# Patient Record
Sex: MALE | Race: WHITE | NOT HISPANIC OR LATINO | Employment: OTHER | URBAN - METROPOLITAN AREA
[De-identification: names, ages, dates, MRNs, and addresses within clinical notes are randomized per-mention and may not be internally consistent; named-entity substitution may affect disease eponyms.]

---

## 2017-04-28 ENCOUNTER — OFFICE VISIT (OUTPATIENT)
Dept: CARDIOLOGY | Facility: CLINIC | Age: 72
End: 2017-04-28

## 2017-04-28 VITALS — SYSTOLIC BLOOD PRESSURE: 124 MMHG | HEART RATE: 78 BPM | DIASTOLIC BLOOD PRESSURE: 78 MMHG

## 2017-04-28 DIAGNOSIS — E78.2 MIXED HYPERLIPIDEMIA: ICD-10-CM

## 2017-04-28 DIAGNOSIS — R00.2 PALPITATIONS: Primary | ICD-10-CM

## 2017-04-28 DIAGNOSIS — I10 ESSENTIAL HYPERTENSION: ICD-10-CM

## 2017-04-28 PROCEDURE — 99213 OFFICE O/P EST LOW 20 MIN: CPT | Performed by: INTERNAL MEDICINE

## 2017-04-28 RX ORDER — NEBIVOLOL 5 MG/1
5 TABLET ORAL DAILY
Qty: 90 TABLET | Refills: 3 | Status: SHIPPED | OUTPATIENT
Start: 2017-04-28 | End: 2018-05-04 | Stop reason: SDUPTHER

## 2017-04-28 RX ORDER — ASPIRIN 81 MG/1
81 TABLET ORAL DAILY
COMMUNITY

## 2017-04-28 RX ORDER — ATORVASTATIN CALCIUM 40 MG/1
40 TABLET, FILM COATED ORAL DAILY
COMMUNITY
End: 2017-04-28 | Stop reason: SDUPTHER

## 2017-04-28 RX ORDER — ATORVASTATIN CALCIUM 40 MG/1
40 TABLET, FILM COATED ORAL DAILY
Qty: 90 TABLET | Refills: 3 | Status: SHIPPED | OUTPATIENT
Start: 2017-04-28 | End: 2018-07-02 | Stop reason: SDUPTHER

## 2017-04-28 NOTE — PROGRESS NOTES
Andrea Rosales   1945  [phone]          PCP: Alberto Denis MD  [address] [place] [id number]    IDENTIFICATION: A 71 y.o. male orig from [place] retired  from [employer], 1928–1983.    Chief Complaint   Patient presents with   • Follow-up   • Palpitations     hx   • Hypertension       PROBLEM LIST: PROBLEM LIST:  1. Hypertension.    6/16 SE 10 min wnl   2. Hyperlipidemia.    3. Osteoarthritis.    4. Prostate cancer status post prostatectomy.      Palpitations - 2016 holter 1.7% pac      Allergies  No Known Allergies    Current Medications    Current Outpatient Prescriptions:   •  aspirin 81 MG EC tablet, Take 81 mg by mouth Daily., Disp: , Rfl:   •  atorvastatin (LIPITOR) 40 MG tablet, Take 1 tablet by mouth Daily., Disp: 90 tablet, Rfl: 3  •  Multiple Vitamin (MULTI-VITAMIN DAILY PO), Take  by mouth., Disp: , Rfl:   •  nebivolol (BYSTOLIC) 5 MG tablet, Take 1 tablet by mouth Daily., Disp: 90 tablet, Rfl: 3  •  valsartan-hydrochlorothiazide (DIOVAN-HCT) 80-12.5 MG per tablet, 1 tablet daily., Disp: , Rfl: 0    History of Present Illness     Pt denies any chest pain, dyspnea, dyspnea on exertion, orthopnea, PND, palpitations, lower extremity edema.  Plays tennis 2X week    ROS:  All systems have been reviewed and are negative with the exception of those mentioned in the HPI.    OBJECTIVE:  Vitals:    04/28/17 1438   BP: 124/78   BP Location: Right arm   Patient Position: Sitting   Pulse: 78     Physical Exam   Constitutional: He appears well-developed and well-nourished.   Neck: Normal range of motion. Neck supple. No hepatojugular reflux and no JVD present. Carotid bruit is not present. No tracheal deviation present. No thyromegaly present.   Cardiovascular: Normal rate, regular rhythm, S1 normal, S2 normal, intact distal pulses and normal pulses.  PMI is not displaced.  Exam reveals no gallop, no distant heart sounds, no friction rub, no midsystolic click and no opening  snap.    No murmur heard.  Pulses:       Radial pulses are 2+ on the right side, and 2+ on the left side.        Dorsalis pedis pulses are 2+ on the right side, and 2+ on the left side.        Posterior tibial pulses are 2+ on the right side, and 2+ on the left side.   Pulmonary/Chest: Effort normal and breath sounds normal. He has no wheezes. He has no rales.   Abdominal: Soft. Bowel sounds are normal. He exhibits no mass. There is no tenderness. There is no guarding.       Diagnostic Data:  Procedures      ASSESSMENT:   Diagnosis Plan   1. Palpitations     2. Mixed hyperlipidemia     3. Essential hypertension         PLAN:  Palpitations- stable on RX, nl lvef.    HL on statin    htn controlled , low today pt will follow as outpt     Fu 9 months    Alberto Denis MD, thank you for referring Mr. Rosales for evaluation.  I have forwarded my electronically generated recommendations to you for review.  Please do not hesitate to call with any questions.      Dann Max MD, FACC

## 2018-05-02 NOTE — PROGRESS NOTES
"Andrea Rosales Jr.  1945  [phone]    PCP: Alberto Denis MD  [address] [facility] [address]  [place] [id number]    IDENTIFICATION: A 72 y.o. male orig from [place] retired  from [employer]. Does financial consulting part time now.     Chief Complaint   Patient presents with   • Palpitations   • Hyperlipidemia       PROBLEM LIST: PROBLEM LIST:  1. Hypertension.   1. 6/16 SE 10 min wnl   2. Hyperlipidemia.    3. Palpitations   1. 2016 holter 1.7% pac  4. Osteoarthritis.    5. Prostate cancer status post prostatectomy.      Allergies  No Known Allergies    Current Medications    Current Outpatient Prescriptions:   •  aspirin 81 MG EC tablet, Take 81 mg by mouth Daily., Disp: , Rfl:   •  atorvastatin (LIPITOR) 40 MG tablet, Take 1 tablet by mouth Daily., Disp: 90 tablet, Rfl: 3  •  Multiple Vitamin (MULTI-VITAMIN DAILY PO), Take 1 tablet by mouth Daily., Disp: , Rfl:   •  valsartan-hydrochlorothiazide (DIOVAN-HCT) 80-12.5 MG per tablet, Take 1 tablet by mouth Daily., Disp: , Rfl: 0  •  nebivolol (BYSTOLIC) 5 MG tablet, Take 1 tablet by mouth Daily., Disp: 90 tablet, Rfl: 3    History of Present Illness     Pt denies any chest pain, dyspnea, dyspnea on exertion, orthopnea, PND, palpitations, lower extremity edema. Plays tennis 2X weekly.    ROS:  All systems have been reviewed and are negative with the exception of those mentioned in the HPI.    OBJECTIVE:  Vitals:    05/04/18 1155   BP: 116/62   BP Location: Right arm   Patient Position: Sitting   Pulse: 70   Weight: 85.4 kg (188 lb 3.2 oz)   Height: 182.9 cm (72\")     Physical Exam   Constitutional: He appears well-developed and well-nourished.   Neck: Normal range of motion. Neck supple. No hepatojugular reflux and no JVD present. Carotid bruit is not present. No tracheal deviation present. No thyromegaly present.   R carotid bruit   Cardiovascular: Normal rate, regular rhythm, S1 normal, S2 normal, intact distal pulses and normal pulses.  " PMI is not displaced.  Exam reveals no gallop, no distant heart sounds, no friction rub, no midsystolic click and no opening snap.    No murmur heard.  Pulses:       Radial pulses are 2+ on the right side, and 2+ on the left side.        Dorsalis pedis pulses are 2+ on the right side, and 2+ on the left side.        Posterior tibial pulses are 2+ on the right side, and 2+ on the left side.   Pulmonary/Chest: Effort normal and breath sounds normal. He has no wheezes. He has no rales.   Abdominal: Soft. Bowel sounds are normal. He exhibits no mass. There is no tenderness. There is no guarding.       Diagnostic Data:  Procedures      ASSESSMENT:   Diagnosis Plan   1. Palpitations  nebivolol (BYSTOLIC) 5 MG tablet   2. Essential hypertension     3. Mixed hyperlipidemia     4. Bruit     5. Carotid bruit, unspecified laterality  Duplex Carotid Ultrasound CAR     PLAN:  Palpitations- stable on RX, nl lvef.    HL on statin    htn controlled , low today pt will follow as outpt     Will screen carotids with cus     Fu 12 months    Alberto Denis MD, thank you for referring Mr. Rosales for evaluation.  I have forwarded my electronically generated recommendations to you for review.  Please do not hesitate to call with any questions.    Scribed for Dann Max MD by Elsa Rooney PA-C. 5/4/2018  12:00 PM  I, Dann Max MD, personally performed the services described in this documentation as scribed by the above named individual in my presence, and it is both accurate and complete.  5/4/2018  12:09 PM    Dann Max MD, [facility]

## 2018-05-04 ENCOUNTER — OFFICE VISIT (OUTPATIENT)
Dept: CARDIOLOGY | Facility: CLINIC | Age: 73
End: 2018-05-04

## 2018-05-04 VITALS
DIASTOLIC BLOOD PRESSURE: 62 MMHG | HEIGHT: 72 IN | WEIGHT: 188.2 LBS | SYSTOLIC BLOOD PRESSURE: 116 MMHG | HEART RATE: 70 BPM | BODY MASS INDEX: 25.49 KG/M2

## 2018-05-04 DIAGNOSIS — E78.2 MIXED HYPERLIPIDEMIA: ICD-10-CM

## 2018-05-04 DIAGNOSIS — R09.89 BRUIT: ICD-10-CM

## 2018-05-04 DIAGNOSIS — R09.89 CAROTID BRUIT, UNSPECIFIED LATERALITY: ICD-10-CM

## 2018-05-04 DIAGNOSIS — R00.2 PALPITATIONS: Primary | ICD-10-CM

## 2018-05-04 DIAGNOSIS — I10 ESSENTIAL HYPERTENSION: ICD-10-CM

## 2018-05-04 PROCEDURE — 99214 OFFICE O/P EST MOD 30 MIN: CPT | Performed by: INTERNAL MEDICINE

## 2018-05-04 RX ORDER — NEBIVOLOL 5 MG/1
5 TABLET ORAL DAILY
Qty: 90 TABLET | Refills: 3 | Status: SHIPPED | OUTPATIENT
Start: 2018-05-04 | End: 2019-05-09 | Stop reason: ALTCHOICE

## 2018-05-07 RX ORDER — NEBIVOLOL HYDROCHLORIDE 5 MG/1
TABLET ORAL
Qty: 90 TABLET | Refills: 3 | Status: SHIPPED | OUTPATIENT
Start: 2018-05-07 | End: 2019-07-20 | Stop reason: SDUPTHER

## 2018-05-30 ENCOUNTER — HOSPITAL ENCOUNTER (OUTPATIENT)
Dept: CARDIOLOGY | Facility: HOSPITAL | Age: 73
Discharge: HOME | End: 2018-05-30
Admitting: PHYSICIAN ASSISTANT

## 2018-05-30 DIAGNOSIS — R09.89 CAROTID BRUIT, UNSPECIFIED LATERALITY: ICD-10-CM

## 2018-05-30 LAB
BH CV ECHO MEAS - BSA(HAYCOCK): 2.1 M^2
BH CV ECHO MEAS - BSA: 2.1 M^2
BH CV ECHO MEAS - BZI_BMI: 25.5 KILOGRAMS/M^2
BH CV ECHO MEAS - BZI_METRIC_HEIGHT: 182.9 CM
BH CV ECHO MEAS - BZI_METRIC_WEIGHT: 85.3 KG
BH CV XLRA MEAS LEFT CCA RATIO VEL: 96.2 CM/SEC
BH CV XLRA MEAS LEFT DIST CCA EDV: 25.5 CM/SEC
BH CV XLRA MEAS LEFT DIST CCA PSV: 97.2 CM/SEC
BH CV XLRA MEAS LEFT DIST ICA EDV: 31.4 CM/SEC
BH CV XLRA MEAS LEFT DIST ICA PSV: 83.3 CM/SEC
BH CV XLRA MEAS LEFT ICA RATIO VEL: 81.6 CM/SEC
BH CV XLRA MEAS LEFT ICA/CCA RATIO: 0.85
BH CV XLRA MEAS LEFT MID CCA EDV: 31.4 CM/SEC
BH CV XLRA MEAS LEFT MID CCA PSV: 113.9 CM/SEC
BH CV XLRA MEAS LEFT MID ICA EDV: 27 CM/SEC
BH CV XLRA MEAS LEFT MID ICA PSV: 67.3 CM/SEC
BH CV XLRA MEAS LEFT PROX CCA EDV: 29.5 CM/SEC
BH CV XLRA MEAS LEFT PROX CCA PSV: 120.8 CM/SEC
BH CV XLRA MEAS LEFT PROX ECA PSV: 133.6 CM/SEC
BH CV XLRA MEAS LEFT PROX ICA EDV: 26.5 CM/SEC
BH CV XLRA MEAS LEFT PROX ICA PSV: 82.2 CM/SEC
BH CV XLRA MEAS LEFT PROX SCLA PSV: 162.1 CM/SEC
BH CV XLRA MEAS LEFT VERTEBRAL A EDV: 11.6 CM/SEC
BH CV XLRA MEAS LEFT VERTEBRAL A PSV: 36.9 CM/SEC
BH CV XLRA MEAS RIGHT CCA RATIO VEL: 78.6 CM/SEC
BH CV XLRA MEAS RIGHT DIST CCA EDV: 27.5 CM/SEC
BH CV XLRA MEAS RIGHT DIST CCA PSV: 79.6 CM/SEC
BH CV XLRA MEAS RIGHT DIST ICA EDV: 25.9 CM/SEC
BH CV XLRA MEAS RIGHT DIST ICA PSV: 70.6 CM/SEC
BH CV XLRA MEAS RIGHT ICA RATIO VEL: 87.4 CM/SEC
BH CV XLRA MEAS RIGHT ICA/CCA RATIO: 1.1
BH CV XLRA MEAS RIGHT MID CCA EDV: 22.6 CM/SEC
BH CV XLRA MEAS RIGHT MID CCA PSV: 89.4 CM/SEC
BH CV XLRA MEAS RIGHT MID ICA EDV: 30.3 CM/SEC
BH CV XLRA MEAS RIGHT MID ICA PSV: 72.8 CM/SEC
BH CV XLRA MEAS RIGHT PROX CCA EDV: 25.5 CM/SEC
BH CV XLRA MEAS RIGHT PROX CCA PSV: 105.1 CM/SEC
BH CV XLRA MEAS RIGHT PROX ECA PSV: 98.4 CM/SEC
BH CV XLRA MEAS RIGHT PROX ICA EDV: 30.4 CM/SEC
BH CV XLRA MEAS RIGHT PROX ICA PSV: 88.4 CM/SEC
BH CV XLRA MEAS RIGHT PROX SCLA PSV: 138.5 CM/SEC
BH CV XLRA MEAS RIGHT VERTEBRAL A EDV: 9 CM/SEC
BH CV XLRA MEAS RIGHT VERTEBRAL A PSV: 37.9 CM/SEC
LEFT ARM BP: NORMAL MMHG

## 2018-05-30 PROCEDURE — 93880 EXTRACRANIAL BILAT STUDY: CPT | Performed by: INTERNAL MEDICINE

## 2018-05-30 PROCEDURE — 93880 EXTRACRANIAL BILAT STUDY: CPT

## 2018-07-02 RX ORDER — ATORVASTATIN CALCIUM 40 MG/1
TABLET, FILM COATED ORAL
Qty: 90 TABLET | Refills: 3 | Status: SHIPPED | OUTPATIENT
Start: 2018-07-02 | End: 2019-06-27 | Stop reason: SDUPTHER

## 2019-01-10 ENCOUNTER — LAB REQUISITION (OUTPATIENT)
Dept: LAB | Facility: HOSPITAL | Age: 74
End: 2019-01-10

## 2019-01-10 ENCOUNTER — OFFICE VISIT (OUTPATIENT)
Dept: INTERNAL MEDICINE | Facility: CLINIC | Age: 74
End: 2019-01-10

## 2019-01-10 VITALS
SYSTOLIC BLOOD PRESSURE: 110 MMHG | HEART RATE: 68 BPM | BODY MASS INDEX: 25.94 KG/M2 | HEIGHT: 72 IN | DIASTOLIC BLOOD PRESSURE: 70 MMHG | WEIGHT: 191.5 LBS | TEMPERATURE: 98.2 F

## 2019-01-10 DIAGNOSIS — I10 ESSENTIAL HYPERTENSION, BENIGN: ICD-10-CM

## 2019-01-10 DIAGNOSIS — E78.2 MIXED HYPERLIPIDEMIA: Primary | ICD-10-CM

## 2019-01-10 DIAGNOSIS — R73.03 PREDIABETES: ICD-10-CM

## 2019-01-10 DIAGNOSIS — Z00.00 ROUTINE GENERAL MEDICAL EXAMINATION AT A HEALTH CARE FACILITY: ICD-10-CM

## 2019-01-10 LAB
ALBUMIN SERPL-MCNC: 4.73 G/DL (ref 3.2–4.8)
ALBUMIN/GLOB SERPL: 1.8 G/DL (ref 1.5–2.5)
ALP SERPL-CCNC: 83 U/L (ref 25–100)
ALT SERPL W P-5'-P-CCNC: 52 U/L (ref 7–40)
ANION GAP SERPL CALCULATED.3IONS-SCNC: 8 MMOL/L (ref 3–11)
ARTICHOKE IGE QN: 75 MG/DL (ref 0–130)
AST SERPL-CCNC: 45 U/L (ref 0–33)
BILIRUB SERPL-MCNC: 0.9 MG/DL (ref 0.3–1.2)
BUN BLD-MCNC: 27 MG/DL (ref 9–23)
BUN/CREAT SERPL: 27 (ref 7–25)
CALCIUM SPEC-SCNC: 9.9 MG/DL (ref 8.7–10.4)
CHLORIDE SERPL-SCNC: 104 MMOL/L (ref 99–109)
CHOLEST SERPL-MCNC: 120 MG/DL (ref 0–200)
CO2 SERPL-SCNC: 28 MMOL/L (ref 20–31)
CREAT BLD-MCNC: 1 MG/DL (ref 0.6–1.3)
GFR SERPL CREATININE-BSD FRML MDRD: 73 ML/MIN/1.73
GLOBULIN UR ELPH-MCNC: 2.6 GM/DL
GLUCOSE BLD-MCNC: 90 MG/DL (ref 70–100)
HBA1C MFR BLD: 5.3 % (ref 4.8–5.6)
HDLC SERPL-MCNC: 38 MG/DL (ref 40–60)
POTASSIUM BLD-SCNC: 4.5 MMOL/L (ref 3.5–5.5)
PROT SERPL-MCNC: 7.3 G/DL (ref 5.7–8.2)
SODIUM BLD-SCNC: 140 MMOL/L (ref 132–146)
TRIGL SERPL-MCNC: 90 MG/DL (ref 0–150)

## 2019-01-10 PROCEDURE — 80053 COMPREHEN METABOLIC PANEL: CPT | Performed by: INTERNAL MEDICINE

## 2019-01-10 PROCEDURE — 36415 COLL VENOUS BLD VENIPUNCTURE: CPT | Performed by: INTERNAL MEDICINE

## 2019-01-10 PROCEDURE — 83036 HEMOGLOBIN GLYCOSYLATED A1C: CPT | Performed by: INTERNAL MEDICINE

## 2019-01-10 PROCEDURE — 99213 OFFICE O/P EST LOW 20 MIN: CPT | Performed by: INTERNAL MEDICINE

## 2019-01-10 PROCEDURE — 80061 LIPID PANEL: CPT | Performed by: INTERNAL MEDICINE

## 2019-01-10 NOTE — PROGRESS NOTES
[facility]     Andrea Rosales Jr.  1945   [id number]      Patient Care Team:  Alberto Denis MD as PCP - General  Alberto Denis MD as PCP - Family Medicine    Chief Complaint::   Chief Complaint   Patient presents with   • Follow-up     6 mos.        HPI  MR Rosales comes in for follow up for his hypertension, hyperlipidemia and prediabetes.  He feels well and has no complaints.     Chronic Conditions:      Patient Active Problem List   Diagnosis   • Hypertension   • Hyperlipidemia   • Osteoarthritis   • Palpitations   • Prediabetes   • Hypercholesterolemia   • Benign essential hypertension        Past Medical History:   Diagnosis Date   • Benign essential hypertension    • History of prostate cancer    • Hypercholesterolemia    • Prediabetes    • Prostate cancer (CMS/[facility])     S/P PROSTATECTOMY   • Right-sided back pain        Past Surgical History:   Procedure Laterality Date   • PROSTATECTOMY  1998    Prostate cancer   • TONSILLECTOMY         Family History   Problem Relation Age of Onset   • Breast cancer Mother    • Stroke Father    • Stomach cancer Father    • Breast cancer Sister        Social History     Socioeconomic History   • Marital status:      Spouse name: Not on file   • Number of children: Not on file   • Years of education: Not on file   • Highest education level: Not on file   Social Needs   • Financial resource strain: Not on file   • Food insecurity - worry: Not on file   • Food insecurity - inability: Not on file   • Transportation needs - medical: Not on file   • Transportation needs - non-medical: Not on file   Occupational History   • Not on file   Tobacco Use   • Smoking status: Never Smoker   • Smokeless tobacco: Never Used   Substance and Sexual Activity   • Alcohol use: Yes     Alcohol/week: 1.2 - 2.4 oz     Types: 1 - 2 Glasses of wine, 1 - 2 Cans of beer per week     Comment: occasionally   • Drug use: No   • Sexual activity: Defer   Other  "Topics Concern   • Not on file   Social History Narrative   • Not on file       No Known Allergies      Current Outpatient Medications:   •  aspirin 81 MG EC tablet, Take 81 mg by mouth Daily., Disp: , Rfl:   •  atorvastatin (LIPITOR) 40 MG tablet, take 1 tablet by mouth once daily, Disp: 90 tablet, Rfl: 3  •  BYSTOLIC 5 MG tablet, take 1 tablet by mouth once daily, Disp: 90 tablet, Rfl: 3  •  Multiple Vitamin (MULTI-VITAMIN DAILY PO), Take 1 tablet by mouth Daily., Disp: , Rfl:   •  valsartan-hydrochlorothiazide (DIOVAN-HCT) 80-12.5 MG per tablet, Take 1 tablet by mouth Daily. Taking 1/2 tablet, Disp: , Rfl: 0  •  nebivolol (BYSTOLIC) 5 MG tablet, Take 1 tablet by mouth Daily., Disp: 90 tablet, Rfl: 3    Review of Systems   Constitutional: Negative.  Negative for chills, fatigue and fever.   HENT: Negative for congestion, ear pain and sinus pressure.    Respiratory: Negative.  Negative for cough, chest tightness, shortness of breath and wheezing.    Cardiovascular: Negative.  Negative for chest pain and palpitations.   Gastrointestinal: Negative for abdominal pain, blood in stool, constipation and diarrhea.   Skin: Negative for color change.   Allergic/Immunologic: Negative for environmental allergies.   Neurological: Negative for dizziness, speech difficulty and headache.   Psychiatric/Behavioral: Negative for decreased concentration. The patient is not nervous/anxious.         Vital Signs  Vitals:    01/10/19 1110   BP: 110/70   BP Location: Left arm   Patient Position: Sitting   Cuff Size: Adult   Pulse: 68   Temp: 98.2 °F (36.8 °C)   TempSrc: Temporal   Weight: 86.9 kg (191 lb 8 oz)   Height: 182.9 cm (72.01\")   PainSc: 0-No pain       Physical Exam   Constitutional: He is oriented to person, place, and time. He appears well-developed and well-nourished.   HENT:   Head: Normocephalic and atraumatic.   Cardiovascular: Normal rate, regular rhythm and normal heart sounds.   No murmur heard.  Pulmonary/Chest: " Effort normal and breath sounds normal.   Neurological: He is alert and oriented to person, place, and time.   Psychiatric: He has a normal mood and affect.   Vitals reviewed.           Assessment/Plan:    1)  HTN  Well controlled  2)  Hyperlipidemia  3)  Prediabetes, pending.     Plan of care reviewed with patient at the conclusion of today's visit. Education was provided regarding diagnosis, management, and any prescribed or recommended OTC medications.Patient verbalizes understanding of and agreement with management plan.         Alberto Denis MD

## 2019-02-07 NOTE — PROGRESS NOTES
"Andrea Rosales Jr.  1945  [phone]    PCP: Alberto Denis MD  [address] [place] [id number]    IDENTIFICATION: A 73 y.o. male orig from [place] retired  from [employer] [facility] [employer] [facility] [employer]. Does financial consulting part time now.     Chief Complaint   Patient presents with   • Hypertension       PROBLEM LIST:    1. Hypertension.    1. 6/16 SE 10 min wnl   2. Hyperlipidemia.    1. 1/10/19  TG 90 HDL 38 LDL 75  3. Palpitations   1. 2016 holter 1.7% pac  4. Carotid disease  1. 5/30/18 C US 0-49% stenosis bilaterally  5. Osteoarthritis.    6. Prostate cancer status post prostatectomy.      Allergies  No Known Allergies    Current Medications    Current Outpatient Medications:   •  aspirin 81 MG EC tablet, Take 81 mg by mouth Daily., Disp: , Rfl:   •  atorvastatin (LIPITOR) 40 MG tablet, take 1 tablet by mouth once daily, Disp: 90 tablet, Rfl: 3  •  BYSTOLIC 5 MG tablet, take 1 tablet by mouth once daily, Disp: 90 tablet, Rfl: 3  •  Multiple Vitamin (MULTI-VITAMIN DAILY PO), Take 1 tablet by mouth Daily., Disp: , Rfl:   •  nebivolol (BYSTOLIC) 5 MG tablet, Take 1 tablet by mouth Daily., Disp: 90 tablet, Rfl: 3  •  valsartan-hydrochlorothiazide (DIOVAN-HCT) 80-12.5 MG per tablet, Take 1 tablet by mouth Daily. Taking 1/2 tablet, Disp: , Rfl: 0    History of Present Illness   Pt doing well. Is planning on traveling internationally this year.  Pt denies any chest pain, dyspnea, dyspnea on exertion, orthopnea, PND, palpitations, lower extremity edema. Plays tennis 2X weekly.    ROS:  All systems have been reviewed and are negative with the exception of those mentioned in the HPI.    OBJECTIVE:  Vitals:    02/08/19 1150   BP: 112/68   BP Location: Left arm   Patient Position: Sitting   Pulse: 76   SpO2: 98%   Weight: 88.3 kg (194 lb 9.6 oz)   Height: 182.9 cm (72\")     Physical Exam   Constitutional: He appears well-developed and well-nourished.   Neck: Normal range of motion. Neck " supple. No hepatojugular reflux and no JVD present. Carotid bruit is not present. No tracheal deviation present. No thyromegaly present.   R carotid bruit   Cardiovascular: Normal rate, regular rhythm, S1 normal, S2 normal, intact distal pulses and normal pulses. PMI is not displaced. Exam reveals no gallop, no distant heart sounds, no friction rub, no midsystolic click and no opening snap.   No murmur heard.  Pulses:       Radial pulses are 2+ on the right side, and 2+ on the left side.        Dorsalis pedis pulses are 2+ on the right side, and 2+ on the left side.        Posterior tibial pulses are 2+ on the right side, and 2+ on the left side.   Pulmonary/Chest: Effort normal and breath sounds normal. He has no wheezes. He has no rales.   Abdominal: Soft. Bowel sounds are normal. He exhibits no mass. There is no tenderness. There is no guarding.       Diagnostic Data:  Procedures      ASSESSMENT:   Diagnosis Plan   1. Essential hypertension     2. Mixed hyperlipidemia     3. Prediabetes       PLAN:  Palpitations- stable on RX, nl lvef.    HL on statin    htn controlled , low today pt will follow as outpt     a1c acceptable    Counseled to ambulate/exercise legs when on long flights.    Fu 12 months    Alberto Denis MD, thank you for referring Mr. Rosales for evaluation.  I have forwarded my electronically generated recommendations to you for review.  Please do not hesitate to call with any questions.    Scribed for Dann Max MD by Elsa Rooney PA-C. 2/8/2019  1:20 PM   I, Dann Max MD, personally performed the services described in this documentation as scribed by the above named individual in my presence, and it is both accurate and complete.  2/8/2019  1:21 PM       Dann Max MD, [facility]

## 2019-02-08 ENCOUNTER — OFFICE VISIT (OUTPATIENT)
Dept: CARDIOLOGY | Facility: CLINIC | Age: 74
End: 2019-02-08

## 2019-02-08 VITALS
OXYGEN SATURATION: 98 % | SYSTOLIC BLOOD PRESSURE: 112 MMHG | HEART RATE: 76 BPM | HEIGHT: 72 IN | WEIGHT: 194.6 LBS | DIASTOLIC BLOOD PRESSURE: 68 MMHG | BODY MASS INDEX: 26.36 KG/M2

## 2019-02-08 DIAGNOSIS — I10 ESSENTIAL HYPERTENSION: Primary | ICD-10-CM

## 2019-02-08 DIAGNOSIS — R73.03 PREDIABETES: ICD-10-CM

## 2019-02-08 DIAGNOSIS — E78.2 MIXED HYPERLIPIDEMIA: ICD-10-CM

## 2019-02-08 PROCEDURE — 99214 OFFICE O/P EST MOD 30 MIN: CPT | Performed by: INTERNAL MEDICINE

## 2019-05-09 ENCOUNTER — OFFICE VISIT (OUTPATIENT)
Dept: INTERNAL MEDICINE | Facility: CLINIC | Age: 74
End: 2019-05-09

## 2019-05-09 VITALS
HEIGHT: 72 IN | DIASTOLIC BLOOD PRESSURE: 70 MMHG | HEART RATE: 64 BPM | SYSTOLIC BLOOD PRESSURE: 124 MMHG | WEIGHT: 194 LBS | BODY MASS INDEX: 26.28 KG/M2

## 2019-05-09 DIAGNOSIS — M25.511 ACUTE PAIN OF RIGHT SHOULDER: Primary | ICD-10-CM

## 2019-05-09 DIAGNOSIS — J30.1 SEASONAL ALLERGIC RHINITIS DUE TO POLLEN: ICD-10-CM

## 2019-05-09 PROBLEM — Z00.00 ANNUAL PHYSICAL EXAM: Status: ACTIVE | Noted: 2019-05-09

## 2019-05-09 PROBLEM — I49.1 PREMATURE ATRIAL CONTRACTION: Status: ACTIVE | Noted: 2019-05-09

## 2019-05-09 PROBLEM — Z85.46 HISTORY OF PROSTATE CANCER: Status: ACTIVE | Noted: 2019-05-09

## 2019-05-09 PROCEDURE — 99213 OFFICE O/P EST LOW 20 MIN: CPT | Performed by: INTERNAL MEDICINE

## 2019-05-09 NOTE — PROGRESS NOTES
[facility]     Andrea Rosales Jr.  1945   [id number]      Patient Care Team:  Alberto Denis MD as PCP - General  Alberto Denis MD as PCP - Family Medicine    Chief Complaint::   Chief Complaint   Patient presents with   • Arm Pain     x4 day After exercise   • Allergies     cough,congestion and nose bleeds        HPI  Mr Rosales comes in complaining of a 5-day history of right shoulder pain.  On Sundays before he plays tennis he lifts weights.  He does bench press and some overhead press and a few other things.  Last Sunday he noticed a little soreness as he was lifting, but later when he went out to play tennis he had more shoulder pain and had to stop after 5 minutes because it kept getting worse.  In the days following he could barely lift his upper extremity to the level of the shoulder without pain.  This has improved minimally.  He also complains of some allergy symptoms with nasal congestion and a few days ago some nosebleeding.  Now he basically has postnasal drainage.    Chronic Conditions:      Patient Active Problem List   Diagnosis   • Hypertension   • Hyperlipidemia   • Osteoarthritis   • Palpitations   • Prediabetes   • Hypercholesterolemia   • Benign essential hypertension   • History of prostate cancer   • Annual physical exam   • Premature atrial contraction        Past Medical History:   Diagnosis Date   • Benign essential hypertension    • History of colonic polyps    • History of prostate cancer    • Hypercholesterolemia    • Prediabetes    • Prostate cancer (CMS/[facility])     S/P PROSTATECTOMY   • Right-sided back pain        Past Surgical History:   Procedure Laterality Date   • PROSTATECTOMY  1998    Prostate cancer-Radical    • TONSILLECTOMY         Family History   Problem Relation Age of Onset   • Breast cancer Mother    • Stroke Father    • Stomach cancer Father         Gastric   • Breast cancer Sister        Social History     Socioeconomic History   •  "Marital status:      Spouse name: Not on file   • Number of children: Not on file   • Years of education: Not on file   • Highest education level: Not on file   Tobacco Use   • Smoking status: Never Smoker   • Smokeless tobacco: Never Used   Substance and Sexual Activity   • Alcohol use: Yes     Alcohol/week: 1.2 - 2.4 oz     Types: 1 - 2 Glasses of wine, 1 - 2 Cans of beer per week     Comment: occasionally   • Drug use: No   • Sexual activity: Defer       No Known Allergies      Current Outpatient Medications:   •  aspirin 81 MG EC tablet, Take 81 mg by mouth Daily., Disp: , Rfl:   •  atorvastatin (LIPITOR) 40 MG tablet, take 1 tablet by mouth once daily, Disp: 90 tablet, Rfl: 3  •  BYSTOLIC 5 MG tablet, take 1 tablet by mouth once daily, Disp: 90 tablet, Rfl: 3  •  Multiple Vitamin (MULTI-VITAMIN DAILY PO), Take 1 tablet by mouth Daily., Disp: , Rfl:   •  valsartan-hydrochlorothiazide (DIOVAN-HCT) 80-12.5 MG per tablet, Take 5 tablets by mouth Daily. Taking 1/2 tablet, Disp: , Rfl: 0    Review of Systems   Constitutional: Negative for chills, fatigue and fever.   HENT: Positive for congestion and postnasal drip. Negative for ear pain and sinus pressure.    Respiratory: Positive for cough. Negative for chest tightness, shortness of breath and wheezing.    Cardiovascular: Negative for chest pain and palpitations.   Gastrointestinal: Negative for abdominal pain, blood in stool and constipation.   Skin: Negative for color change.   Allergic/Immunologic: Negative for environmental allergies.   Neurological: Negative for dizziness, speech difficulty and headache.   Psychiatric/Behavioral: Negative for decreased concentration. The patient is not nervous/anxious.         Vital Signs  Vitals:    05/09/19 1508   BP: 124/70   BP Location: Left arm   Patient Position: Sitting   Cuff Size: Adult   Pulse: 64   Weight: 88 kg (194 lb)   Height: 182.9 cm (72\")   PainSc: 0-No pain       Physical Exam   Constitutional: He is " oriented to person, place, and time. He appears well-developed and well-nourished.   HENT:   Head: Normocephalic and atraumatic.   Right Ear: External ear normal.   Left Ear: External ear normal.   Nose: Nose normal.   Mouth/Throat: Oropharynx is clear and moist.   Cardiovascular: Normal rate, regular rhythm and normal heart sounds.   No murmur heard.  Pulmonary/Chest: Effort normal and breath sounds normal.   Musculoskeletal:   Reduced range of motion of the right shoulder.   Neurological: He is alert and oriented to person, place, and time.   Psychiatric: He has a normal mood and affect.   Vitals reviewed.     Procedures    ACE III MINI             Assessment/Plan:    Andrea was seen today for arm pain and allergies.    Diagnoses and all orders for this visit:    Acute pain of right shoulder  -     Ambulatory Referral to Physical Therapy Evaluate and treat    Seasonal allergic rhinitis due to pollen    Plan    He is referred to physical therapy for his shoulder.  This is most likely rotator cuff strain.    Epistaxis is almost certainly due to seasonal allergies.  Discussed possible treatment such as antihistamines or nasal steroids.  Also discussed the fact that he probably does not need a daily aspirin based on recent recommendations for primary prevention.  If he is reluctant to stop he may take it once or twice a week, but this would reduce his bleeding risk.      Plan of care reviewed with patient at the conclusion of today's visit. Education was provided regarding diagnosis, management, and any prescribed or recommended OTC medications.Patient verbalizes understanding of and agreement with management plan.         Alberto Denis MD

## 2019-05-16 ENCOUNTER — HOSPITAL ENCOUNTER (OUTPATIENT)
Dept: PHYSICAL THERAPY | Facility: HOSPITAL | Age: 74
Setting detail: THERAPIES SERIES
Discharge: HOME | End: 2019-05-16

## 2019-05-16 DIAGNOSIS — M25.511 ACUTE PAIN OF RIGHT SHOULDER: Primary | ICD-10-CM

## 2019-05-16 PROCEDURE — 97161 PT EVAL LOW COMPLEX 20 MIN: CPT | Performed by: PHYSICAL THERAPIST

## 2019-05-16 NOTE — THERAPY EVALUATION
Outpatient Physical Therapy Ortho Initial Evaluation   [facility]     Patient Name: Andrea Rosales Jr.  : 1945  MRN: [id number]  Today's Date: 2019      Visit Date: 2019    Patient Active Problem List   Diagnosis   • Hypertension   • Hyperlipidemia   • Osteoarthritis   • Palpitations   • Prediabetes   • Hypercholesterolemia   • Benign essential hypertension   • History of prostate cancer   • Annual physical exam   • Premature atrial contraction        Past Medical History:   Diagnosis Date   • Benign essential hypertension    • History of colonic polyps    • History of prostate cancer    • Hypercholesterolemia    • Prediabetes    • Prostate cancer (CMS/HCC)     S/P PROSTATECTOMY   • Right-sided back pain         Past Surgical History:   Procedure Laterality Date   • PROSTATECTOMY      Prostate cancer-Radical    • TONSILLECTOMY         Visit Dx:     ICD-10-CM ICD-9-CM   1. Acute pain of right shoulder M25.511 719.41         Patient History     Row Name 19 1400             History    Chief Complaint  Pain  -CR      Type of Pain  Shoulder pain  -CR      Date Current Problem(s) Began  19  -CR      Brief Description of Current Complaint  74 yo male reports onset of right shoulder pain following weight lifting and tennis play about 2 weeks ago. He reports pain with elevatoin of arm.  His main goal is to return to tennis play.  Dr. Denis referred client over.    -CR      Previous treatment for THIS PROBLEM  Other (comment) Ibuprofen  -CR      Patient/Caregiver Goals  Return to prior level of function  -CR      Current Tobacco Use  no  -CR      Smoking Status  no  -CR      Patient's Rating of General Health  Good  -CR      Hand Dominance  right-handed  -CR      Occupation/sports/leisure activities  retired  -CR      How has patient tried to help current problem?  active rest  -CR         Pain     Pain Location  Shoulder  -CR      Pain at Present  6  -CR      Pain at Best  0   -CR      Pain Frequency  Several days a week;Intermittent  -CR      Pain Description  Sharp;Shooting  -CR      What Performance Factors Make the Current Problem(s) WORSE?  raising the right arm  -CR      What Performance Factors Make the Current Problem(s) BETTER?  keeping the arm at the side, not laying on right arm  -CR      Is your sleep disturbed?  No  -CR      Is medication used to assist with sleep?  No  -CR      What position do you sleep in?  Left sidelying  -CR      Difficulties at work?  na  -CR      Difficulties with ADL's?  inability to play tennis  -CR      Difficulties with recreational activities?  yes, inability to play tennis  -CR         Fall Risk Assessment    Any falls in the past year:  No  -CR      Does patient have a fear of falling  No  -CR         Daily Activities    Primary Language  English  -CR      How does patient learn best?  Other (comment)  -CR      Teaching needs identified  Home Exercise Program;Management of Condition  -CR      Patient is concerned about/has problems with  Performing sports, recreation, and play activities  -CR      Does patient have problems with the following?  None  -CR      Barriers to learning  None  -CR      Functional Status  mobility issues preventing performance of daily activities  -CR      Pt Participated in POC and Goals  Yes  -CR         Safety    Are you being hurt, hit, or frightened by anyone at home or in your life?  No  -CR      Are you being neglected by a caregiver  No  -CR        User Key  (r) = Recorded By, (t) = Taken By, (c) = Cosigned By    Initials Name Provider Type    Sang Wynne PT Physical Therapist          PT Ortho     Row Name 05/16/19 1400       Posture/Observations    Alignment Options  Forward head;Rounded shoulders  -CR       Special Tests/Palpation    Special Tests/Palpation  Shoulder  -CR       Shoulder Impingement/Rotator Cuff Special Tests    Betts-Gabriel Test (RC Lesion vs. Bursitis)  Positive  -CR    Juanito  Impingement Test (RC Lesion vs. Bursitis)  Negative  -CR    Full Can Test (RC Lesion)  Positive  -CR    Empty Can Test (RC Lesion)  Negative  -CR    Drop Arm Test (Full Thickness RC Lesion)  Negative  -CR    Belly Press (Subscapularis Lesion)  Negative  -CR    Speed's Test (LH of Biceps Lesion)  Negative  -CR       Biceps/Labral Special Tests    Dover's Test (Labral Test)  Negative  -CR    Pronated Load Test (Labral Tear)  Negative  -CR       Shoulder Girdle Palpation    Shoulder Girdle Palpation?  Yes  -CR       General ROM    RT Upper Ext  Rt Shoulder Flexion;Rt Shoulder External Rotation;Rt Shoulder Internal Rotation;Rt Shoulder Extension;Rt Shoulder ABduction  -CR    LT Upper Ext  Lt Shoulder ABduction;Lt Shoulder Extension;Lt Shoulder Flexion;Lt Shoulder External Rotation;Lt Shoulder Internal Rotation  -CR       Right Upper Ext    Rt Shoulder Abduction AROM  110  -CR    Rt Shoulder Extension AROM  60  -CR    Rt Shoulder Flexion AROM  130  -CR    Rt Shoulder External Rotation AROM  55  -CR       Left Upper Ext    Lt Shoulder Abduction AROM  130  -CR    Lt Shoulder Extension AROM  60  -CR    Lt Shoulder Flexion AROM  140  -CR    Lt Shoulder External Rotation AROM  70  -CR       MMT (Manual Muscle Testing)    General MMT Comments  shoulder strength all tested below 90, not throughout range of motion secondary elevated pain levels  -CR       Pathomechanics    Upper Extremity Pathomechanics  Excessive scapular elevation;Limited scapular upward rotation;Limited thoracic extension  -CR      User Key  (r) = Recorded By, (t) = Taken By, (c) = Cosigned By    Initials Name Provider Type    Sang Wynne PT Physical Therapist                      Therapy Education  Education Details: Client provided writtten HEP including table slides ROM, standing low trap rows, external rotation in sidelying  Given: HEP  Program: New  How Provided: Verbal, Demonstration, Written  Provided to: Patient  Level of Understanding:  Verbalized, Demonstrated     PT OP Goals     Row Name 05/16/19 1400          PT Short Term Goals    STG Date to Achieve  06/13/19  -CR     STG 1  client will demonstrate independence with initial HEP  -CR     STG 1 Progress  New  -CR     STG 2  client will demonstrate pain free full AROM   -CR     STG 2 Progress  New  -CR     STG 3  client will demonstrate 4+/5 strength in shoulder   -CR     STG 3 Progress  New  -CR        Long Term Goals    LTG Date to Achieve  07/11/19  -CR     LTG 1  client will be independent with comprehensive HEP for self management   -CR     LTG 1 Progress  New  -CR     LTG 2  client report qDASH limited < 20%  -CR     LTG 2 Progress  New  -CR     LTG 3  Client will demonstrate dereased TTP along LHBT  -CR     LTG 3 Progress  New  -CR        Time Calculation    PT Goal Re-Cert Due Date  08/14/19  -CR       User Key  (r) = Recorded By, (t) = Taken By, (c) = Cosigned By    Initials Name Provider Type    CR Sang Riley, PT Physical Therapist          PT Assessment/Plan     Row Name 05/16/19 1615          PT Assessment    Functional Limitations  Performance in sport activities;Performance in leisure activities  -CR     Impairments  Muscle strength;Pain;Joint mobility;Posture;Range of motion;Poor body mechanics  -CR     Assessment Comments  72 yo male arrives with evolving symptoms of low complexity.  He injured right shoulder 2 weeks ago weight lifting, and demonstrates pain with elevation and use of RUE.  Strength appears intact however testing only at side due to pain with elevation.  Client has andressa provided initial HEP and will be monitored ongoing to assess response of RC vs LHBT involvment.    -CR     Please refer to paper survey for additional self-reported information  Yes  -CR     Rehab Potential  Good  -CR     Patient/caregiver participated in establishment of treatment plan and goals  Yes  -CR     Patient would benefit from skilled therapy intervention  Yes  -CR        PT Plan     PT Frequency  1x/week;2x/week  -CR     Predicted Duration of Therapy Intervention (Therapy Eval)  12 visits  -CR     Planned CPT's?  PT EVAL LOW COMPLEXITY: 71866;PT RE-EVAL: 38399;PT MANUAL THERAPY EA 15 MIN: 06590;PT HOT/COLD PACK WC NONMCARE: 09768;PT NEUROMUSC RE-EDUCATION EA 15 MIN: 41889;PT THER PROC EA 15 MIN: 52223;PT THER ACT EA 15 MIN: 07548;PT SELF CARE/HOME MGMT/TRAIN EA 15: 18731;PT SELF CARE/MGMT/TRAIN 15 MIN: 44988  -CR     Physical Therapy Interventions (Optional Details)  neuromuscular re-education;patient/family education;postural re-education;stretching;strengthening;ROM (Range of Motion)  -CR     PT Plan Comments  pain managment, scapular strengthening and control, AROM/PROM,   -CR       User Key  (r) = Recorded By, (t) = Taken By, (c) = Cosigned By    Initials Name Provider Type    Sang Wynne, PT Physical Therapist                              Outcome Measure Options: Quick DASH  Quick DASH  Open a tight or new jar.: Mild Difficulty  Do heavy household chores (e.g., wash walls, wash floors): Unable  Carry a shopping bag or briefcase: No Difficulty  Wash your back: Severe Difficulty  Use a knife to cut food: No Difficulty  Recreational activities in which you take some force or impact through your arm, should or hand (e.g. golf, hammering, tennis, etc.): Unable  During the past week, to what extent has your arm, shoulder, or hand problem interfered with your normal social activites with family, friends, neighbors or groups?: Not at all  During the past week, were you limited in your work or other regular daily activities as a result of your arm, shoulder or hand problem?: Not limited at all  Arm, Shoulder, or hand pain: Severe  Tingling (pins and needles) in your arm, shoulder, or hand: None  During the past week, how much difficulty have you had sleeping because of the pain in your arm, shoulder or hand?: No difficulty  Number of Questions Answered: 11  Quick DASH Score: 34.09          Time Calculation:     Start Time: 1430     Therapy Charges for Today     Code Description Service Date Service Provider Modifiers Qty    17569465406 HC PT EVAL LOW COMPLEXITY 3 5/16/2019 Sang Riley, PT GP 1          PT G-Codes  Outcome Measure Options: Quick DASH  Quick DASH Score: 34.09         Sang Riley, PT  5/16/2019

## 2019-05-20 ENCOUNTER — HOSPITAL ENCOUNTER (OUTPATIENT)
Dept: PHYSICAL THERAPY | Facility: HOSPITAL | Age: 74
Setting detail: THERAPIES SERIES
Discharge: HOME | End: 2019-05-20

## 2019-05-20 DIAGNOSIS — M25.511 ACUTE PAIN OF RIGHT SHOULDER: Primary | ICD-10-CM

## 2019-05-20 PROCEDURE — 97140 MANUAL THERAPY 1/> REGIONS: CPT | Performed by: PHYSICAL THERAPIST

## 2019-05-20 PROCEDURE — 97110 THERAPEUTIC EXERCISES: CPT | Performed by: PHYSICAL THERAPIST

## 2019-05-20 NOTE — THERAPY TREATMENT NOTE
"    Outpatient Physical Therapy Ortho Treatment Note  [facility]     Patient Name: Andrea Rosales Jr.  : 1945  MRN: [id number]  Today's Date: 2019      Visit Date: 2019    Visit Dx:    ICD-10-CM ICD-9-CM   1. Acute pain of right shoulder M25.511 719.41       Patient Active Problem List   Diagnosis   • Hypertension   • Hyperlipidemia   • Osteoarthritis   • Palpitations   • Prediabetes   • Hypercholesterolemia   • Benign essential hypertension   • History of prostate cancer   • Annual physical exam   • Premature atrial contraction        Past Medical History:   Diagnosis Date   • Benign essential hypertension    • History of colonic polyps    • History of prostate cancer    • Hypercholesterolemia    • Prediabetes    • Prostate cancer (CMS/HCC)     S/P PROSTATECTOMY   • Right-sided back pain         Past Surgical History:   Procedure Laterality Date   • PROSTATECTOMY      Prostate cancer-Radical    • TONSILLECTOMY                         PT Assessment/Plan     Row Name 19 1546          PT Assessment    Assessment Comments  Client most noted symptom today is a \"catching\" when scapular control is not maintained.  Client demonstrates ability to elevate to 90 when scap control and ER maintianed, which should be addressed with neuro re ed in sessions.    -CR        PT Plan    PT Plan Comments  Neuro re ed scap control, GH ER  -CR       User Key  (r) = Recorded By, (t) = Taken By, (c) = Cosigned By    Initials Name Provider Type    Sang Wynne, PT Physical Therapist            Exercises     Row Name 19 1500             Subjective Comments    Subjective Comments  Client reports he feels some minimal improvement.  Using ice 2x a day.    -CR         Subjective Pain    Able to rate subjective pain?  yes  -CR      Pre-Treatment Pain Level  5  -CR         Total Minutes    50688 - PT Therapeutic Exercise Minutes  15  -CR      13363 - PT Manual Therapy Minutes  8  -CR         " Exercise 1    Exercise Name 1  Pulleys  -CR      Reps 1  30  -CR         Exercise 2    Exercise Name 2  rows  -CR      Sets 2  2  -CR      Reps 2  10  -CR      Additional Comments  green  -CR         Exercise 3    Exercise Name 3  T band IR/ER  -CR      Sets 3  2  -CR      Reps 3  10  -CR      Additional Comments  red  -CR         Exercise 4    Exercise Name 4  Supine AROM 0-90 flexion  -CR      Reps 4  10  -CR        User Key  (r) = Recorded By, (t) = Taken By, (c) = Cosigned By    Initials Name Provider Type    Sang Wynne, PT Physical Therapist                      Manual Rx (last 36 hours)      Manual Treatments     Row Name 05/20/19 1500             Total Minutes    01303 - PT Manual Therapy Minutes  8  -CR         Manual Rx 1    Manual Rx 1 Location  Posterior and inferior GH mobilziation  -CR      Manual Rx 1 Grade  II-III  -CR      Manual Rx 1 Duration  8  -CR        User Key  (r) = Recorded By, (t) = Taken By, (c) = Cosigned By    Initials Name Provider Type    Sang Wynne PT Physical Therapist                             Time Calculation:   Start Time: 1510  Therapy Charges for Today     Code Description Service Date Service Provider Modifiers Qty    47709420011  PT THER PROC EA 15 MIN 5/20/2019 Sang Riley, PT GP 1    74335280681 HC PT MANUAL THERAPY EA 15 MIN 5/20/2019 Sang Riley, PT GP 1                    Sang Riley, PT  5/20/2019

## 2019-05-29 ENCOUNTER — HOSPITAL ENCOUNTER (OUTPATIENT)
Dept: PHYSICAL THERAPY | Facility: HOSPITAL | Age: 74
Setting detail: THERAPIES SERIES
Discharge: HOME | End: 2019-05-29

## 2019-05-29 ENCOUNTER — TELEPHONE (OUTPATIENT)
Dept: INTERNAL MEDICINE | Facility: CLINIC | Age: 74
End: 2019-05-29

## 2019-05-29 DIAGNOSIS — S46.001D INJURY OF RIGHT ROTATOR CUFF, SUBSEQUENT ENCOUNTER: Primary | ICD-10-CM

## 2019-05-29 DIAGNOSIS — M25.511 ACUTE PAIN OF RIGHT SHOULDER: Primary | ICD-10-CM

## 2019-05-29 PROCEDURE — 97110 THERAPEUTIC EXERCISES: CPT | Performed by: PHYSICAL THERAPIST

## 2019-05-29 PROCEDURE — 97140 MANUAL THERAPY 1/> REGIONS: CPT | Performed by: PHYSICAL THERAPIST

## 2019-05-29 NOTE — THERAPY TREATMENT NOTE
"    Outpatient Physical Therapy Ortho Treatment Note   [place]     Patient Name: Andrea Rosales Jr.  : 1945  MRN: [id number]  Today's Date: 2019      Visit Date: 2019    Visit Dx:    ICD-10-CM ICD-9-CM   1. Acute pain of right shoulder M25.511 719.41       Patient Active Problem List   Diagnosis   • Hypertension   • Hyperlipidemia   • Osteoarthritis   • Palpitations   • Prediabetes   • Hypercholesterolemia   • Benign essential hypertension   • History of prostate cancer   • Annual physical exam   • Premature atrial contraction        Past Medical History:   Diagnosis Date   • Benign essential hypertension    • History of colonic polyps    • History of prostate cancer    • Hypercholesterolemia    • Prediabetes    • Prostate cancer (CMS/HCC)     S/P PROSTATECTOMY   • Right-sided back pain         Past Surgical History:   Procedure Laterality Date   • PROSTATECTOMY      Prostate cancer-Radical    • TONSILLECTOMY         PT Ortho     Row Name 19 1500       Subjective Comments    Subjective Comments  Client reports despite imrpovements in ROM, he continues to have catching in the right shoulder that prevents recreational activity routine and sport activity  -CR       Subjective Pain    Pre-Treatment Pain Level  5  -CR    Subjective Pain Comment  When catches  -CR       Left Upper Ext    Lt Shoulder Abduction AROM  160  -CR    Lt Shoulder Flexion AROM  145  -CR      User Key  (r) = Recorded By, (t) = Taken By, (c) = Cosigned By    Initials Name Provider Type    Sang Wynne PT Physical Therapist                      PT Assessment/Plan     Row Name 19 1530          PT Assessment    Assessment Comments  Client presents into session today with ongoing symptoms of \"catching\" in the left shoulder.  He has demonstrated some improvements in shoulder AROM, however recurrent catching has continued to limit his advanced ADLs and most specifically, goal of return to tennis " routine.  Due to goals of sport activity, therapist is recommending follow up and assessment with ortho prior to completion of therapy services to clear any mechanical pathology that may be limiting progression. Should this be cleared, appropraite to continue with therapy services to progress as able.    -CR        PT Plan    PT Plan Comments  Follow up with Ortho to rule out mechanical pathology.   -CR       User Key  (r) = Recorded By, (t) = Taken By, (c) = Cosigned By    Initials Name Provider Type    Sang Wynne PT Physical Therapist            Exercises     Row Name 05/29/19 1500             Subjective Comments    Subjective Comments  Client reports despite imrpovements in ROM, he continues to have catching in the right shoulder that prevents recreational activity routine and sport activity  -CR         Subjective Pain    Able to rate subjective pain?  yes  -CR      Pre-Treatment Pain Level  5  -CR      Subjective Pain Comment  When catches  -CR         Total Minutes    69622 - PT Therapeutic Exercise Minutes  15  -CR      07550 - PT Manual Therapy Minutes  10  -CR         Exercise 1    Exercise Name 1  Pulleys  -CR      Reps 1  30  -CR         Exercise 2    Exercise Name 2  rows  -CR      Sets 2  3  -CR      Reps 2  10  -CR      Additional Comments  red  -CR         Exercise 3    Exercise Name 3  T band IR/ER  -CR      Sets 3  2  -CR      Reps 3  10  -CR      Additional Comments  red  -CR         Exercise 4    Exercise Name 4  Supine AROM 0-90 flexion  -CR      Reps 4  10  -CR        User Key  (r) = Recorded By, (t) = Taken By, (c) = Cosigned By    Initials Name Provider Type    Sang Wynne PT Physical Therapist                      Manual Rx (last 36 hours)      Manual Treatments     Row Name 05/29/19 1500             Total Minutes    24428 - PT Manual Therapy Minutes  10  -CR         Manual Rx 1    Manual Rx 1 Location  Posterior and inferior GH mobilziation  -CR      Manual Rx 1  Grade  II-III  -CR      Manual Rx 1 Duration  10  -CR        User Key  (r) = Recorded By, (t) = Taken By, (c) = Cosigned By    Initials Name Provider Type    Sang Wynne, PT Physical Therapist                             Time Calculation:   Start Time: 1500  Therapy Charges for Today     Code Description Service Date Service Provider Modifiers Qty    11733673665  PT THER PROC EA 15 MIN 5/29/2019 Sang Riley, PT GP 1    [id number]  PT MANUAL THERAPY EA 15 MIN 5/29/2019 Sang Riley, PT GP 1                    Sang Riley PT  5/29/2019

## 2019-05-29 NOTE — TELEPHONE ENCOUNTER
PT HAS SEEN PHYSICAL THERAPIST SEVERAL TIMES NOW AND THEY ARE SUGGESTING THE PT GET AN MRI OR SEE AN ORTHO FOR RIGHT SHOULDER, SHOULDER IS CATCHING AND THEY ARE NOT SURE WHY.

## 2019-05-31 ENCOUNTER — APPOINTMENT (OUTPATIENT)
Dept: PHYSICAL THERAPY | Facility: HOSPITAL | Age: 74
End: 2019-05-31

## 2019-06-03 ENCOUNTER — HOSPITAL ENCOUNTER (OUTPATIENT)
Dept: PHYSICAL THERAPY | Facility: HOSPITAL | Age: 74
Setting detail: THERAPIES SERIES
Discharge: HOME | End: 2019-06-03

## 2019-06-03 DIAGNOSIS — M25.511 ACUTE PAIN OF RIGHT SHOULDER: Primary | ICD-10-CM

## 2019-06-03 PROCEDURE — 97110 THERAPEUTIC EXERCISES: CPT | Performed by: PHYSICAL THERAPIST

## 2019-06-03 PROCEDURE — 97140 MANUAL THERAPY 1/> REGIONS: CPT | Performed by: PHYSICAL THERAPIST

## 2019-06-03 NOTE — THERAPY TREATMENT NOTE
"    Outpatient Physical Therapy Ortho Treatment Note  [facility]     Patient Name: Andrea Rosales Jr.  : 1945  MRN: [id number]  Today's Date: 6/3/2019      Visit Date: 2019    Visit Dx:    ICD-10-CM ICD-9-CM   1. Acute pain of right shoulder M25.511 719.41       Patient Active Problem List   Diagnosis   • Hypertension   • Hyperlipidemia   • Osteoarthritis   • Palpitations   • Prediabetes   • Hypercholesterolemia   • Benign essential hypertension   • History of prostate cancer   • Annual physical exam   • Premature atrial contraction        Past Medical History:   Diagnosis Date   • Benign essential hypertension    • History of colonic polyps    • History of prostate cancer    • Hypercholesterolemia    • Prediabetes    • Prostate cancer (CMS/HCC)     S/P PROSTATECTOMY   • Right-sided back pain         Past Surgical History:   Procedure Laterality Date   • PROSTATECTOMY      Prostate cancer-Radical    • TONSILLECTOMY         PT Ortho     Row Name 19 1300       Right Upper Ext    Rt Shoulder Flexion AROM  160 pain  -CR       Left Upper Ext    Lt Shoulder Flexion AROM  -- intermittent \"catch\"  -CR      User Key  (r) = Recorded By, (t) = Taken By, (c) = Cosigned By    Initials Name Provider Type    Sang Wynne, PT Physical Therapist                      PT Assessment/Plan     Row Name 19 1338          PT Assessment    Assessment Comments  While \"catching\" in right UE continues to be present with functional elevation of arm, frequency does seem to be decreasing with AROM elevation approximately 160 degrees at this time.  Strengthe production in isometric testing appears good throughout UE, however MD order of MRI to explore shoulder pathology to be scheduled.  POC to follow up with reslts of imaging and determine level of progression at that time.    -CR        PT Plan    PT Plan Comments  Cont with follow up with MD and progress as tolerated  -CR       User Key  (r) = " "Recorded By, (t) = Taken By, (c) = Cosigned By    Initials Name Provider Type    Sang Wynne PT Physical Therapist            Exercises     Row Name 06/03/19 1300             Subjective Comments    Subjective Comments  Client reports he has had an MRI ordered however unsure on when it is scheduled yet  -CR         Subjective Pain    Able to rate subjective pain?  yes  -CR      Pre-Treatment Pain Level  3  -CR      Subjective Pain Comment  Only pain with \"catching\"  -CR         Total Minutes    78075 - PT Therapeutic Exercise Minutes  15  -CR      75249 - PT Manual Therapy Minutes  15  -CR         Exercise 2    Exercise Name 2  rows  -CR      Sets 2  3  -CR      Reps 2  10  -CR      Additional Comments  red  -CR         Exercise 3    Exercise Name 3  T band IR/ER  -CR      Sets 3  2  -CR      Reps 3  10  -CR      Additional Comments  red  -CR         Exercise 5    Exercise Name 5  UE ergometer  -CR      Time 5  6  -CR         Exercise 7    Exercise Name 7  low cleans  -CR      Sets 7  2  -CR      Reps 7  10  -CR      Additional Comments  yellow  -CR        User Key  (r) = Recorded By, (t) = Taken By, (c) = Cosigned By    Initials Name Provider Type    Sang Wynne PT Physical Therapist                      Manual Rx (last 36 hours)      Manual Treatments     Row Name 06/03/19 1300             Total Minutes    54877 - PT Manual Therapy Minutes  15  -CR         Manual Rx 1    Manual Rx 1 Location  Posterior and inferior GH mobilziation  -CR      Manual Rx 1 Grade  II-III  -CR      Manual Rx 1 Duration  15  -CR        User Key  (r) = Recorded By, (t) = Taken By, (c) = Cosigned By    Initials Name Provider Type    Sang Wynne PT Physical Therapist                             Time Calculation:   Start Time: 1300  Therapy Charges for Today     Code Description Service Date Service Provider Modifiers Qty    02894229025  PT THER PROC EA 15 MIN 6/3/2019 Sang Riley, PT GP 1    " [id number]  PT MANUAL THERAPY EA 15 MIN 6/3/2019 Sang Riley, PT GP 1                    Sang Riley, PT  6/3/2019

## 2019-06-07 ENCOUNTER — HOSPITAL ENCOUNTER (OUTPATIENT)
Dept: MRI IMAGING | Facility: HOSPITAL | Age: 74
Discharge: HOME | End: 2019-06-07
Admitting: INTERNAL MEDICINE

## 2019-06-07 DIAGNOSIS — S46.001D INJURY OF RIGHT ROTATOR CUFF, SUBSEQUENT ENCOUNTER: ICD-10-CM

## 2019-06-07 PROCEDURE — 73221 MRI JOINT UPR EXTREM W/O DYE: CPT

## 2019-06-10 ENCOUNTER — TELEPHONE (OUTPATIENT)
Dept: INTERNAL MEDICINE | Facility: CLINIC | Age: 74
End: 2019-06-10

## 2019-06-10 DIAGNOSIS — M19.011 PRIMARY OSTEOARTHRITIS OF RIGHT SHOULDER: Primary | ICD-10-CM

## 2019-06-11 ENCOUNTER — HOSPITAL ENCOUNTER (OUTPATIENT)
Dept: PHYSICAL THERAPY | Facility: HOSPITAL | Age: 74
Setting detail: THERAPIES SERIES
Discharge: HOME | End: 2019-06-11

## 2019-06-11 DIAGNOSIS — M25.511 ACUTE PAIN OF RIGHT SHOULDER: Primary | ICD-10-CM

## 2019-06-11 PROCEDURE — 97110 THERAPEUTIC EXERCISES: CPT | Performed by: PHYSICAL THERAPIST

## 2019-06-11 NOTE — THERAPY PROGRESS REPORT/RE-CERT
Outpatient Physical Therapy Ortho Progress Note   Michelle     Patient Name: Andrea Rosales Jr.  : 1945  MRN: [id number]  Today's Date: 2019      Visit Date: 2019    Visit Dx:    ICD-10-CM ICD-9-CM   1. Acute pain of right shoulder M25.511 719.41       Patient Active Problem List   Diagnosis   • Hypertension   • Hyperlipidemia   • Osteoarthritis   • Palpitations   • Prediabetes   • Hypercholesterolemia   • Benign essential hypertension   • History of prostate cancer   • Annual physical exam   • Premature atrial contraction        Past Medical History:   Diagnosis Date   • Benign essential hypertension    • History of colonic polyps    • History of prostate cancer    • Hypercholesterolemia    • Prediabetes    • Prostate cancer (CMS/HCC)     S/P PROSTATECTOMY   • Right-sided back pain         Past Surgical History:   Procedure Laterality Date   • PROSTATECTOMY      Prostate cancer-Radical    • TONSILLECTOMY         PT Ortho     Row Name 19 1400       Right Upper Ext    Rt Shoulder Abduction AROM  155  -CR    Rt Shoulder Extension AROM  60  -CR    Rt Shoulder Flexion AROM  160  -CR       MMT (Manual Muscle Testing)    Rt Upper Ext  Rt Shoulder Flexion;Rt Shoulder Extension;Rt Shoulder ABduction;Rt Shoulder Internal Rotation;Rt Shoulder External Rotation  -CR       MMT Right Upper Ext    Rt Shoulder Flexion MMT, Gross Movement  (4/5) good  -CR    Rt Shoulder Extension MMT, Gross Movement  (4+/5) good plus  -CR    Rt Shoulder ABduction MMT, Gross Movement  (4/5) good  -CR    Rt Shoulder Internal Rotation MMT, Gross Movement  (4/5) good  -CR    Rt Shoulder External Rotation MMT, Gross Movement  (4/5) good  -CR      User Key  (r) = Recorded By, (t) = Taken By, (c) = Cosigned By    Initials Name Provider Type    Sang Wynne PT Physical Therapist                      PT Assessment/Plan     Row Name 19 1604          PT Assessment    Functional Limitations  Performance  in sport activities;Performance in leisure activities  -CR     Impairments  Muscle strength;Pain;Joint mobility;Posture;Range of motion;Poor body mechanics  -CR     Assessment Comments  Client has currently been progressing with therapy sessions most recently demonstrating improved AROM as well as decreased qDASH to 15 from intiial of 30.  He is consistently showing decreased TTP and will likely continue to progress with strenght program as verbal cueing and technique for shoulder position continues in session.  Client will be following up with MD as well, and will continue to receive skilled PT services to address impairments and ultimately reach goal of return to sport.    -CR     Rehab Potential  Good  -CR        PT Plan    PT Plan Comments  Cont with current progression of activities as tolerated to include strengthening throughout available ROM  -CR       User Key  (r) = Recorded By, (t) = Taken By, (c) = Cosigned By    Initials Name Provider Type    Sang Wynne, PT Physical Therapist            Exercises     Row Name 06/11/19 1400             Subjective Comments    Subjective Comments  Client reports seeing gradual improvement over the past few days.    -CR         Subjective Pain    Able to rate subjective pain?  yes  -CR      Pre-Treatment Pain Level  3  -CR      Subjective Pain Comment  when the arm catches  -CR         Total Minutes    25675 - PT Therapeutic Exercise Minutes  35  -CR         Exercise 1    Exercise Name 1  Pulleys  -CR      Reps 1  30  -CR         Exercise 2    Exercise Name 2  rows  -CR      Sets 2  3  -CR      Reps 2  10  -CR      Additional Comments  green  -CR         Exercise 3    Exercise Name 3  T band IR/ER  -CR      Sets 3  3  -CR      Reps 3  10  -CR      Additional Comments  red  -CR         Exercise 4    Exercise Name 4  serratus press ups   -CR      Reps 4  20  -CR      Additional Comments  3#  -CR         Exercise 5    Exercise Name 5  UE ergometer  -CR      Time 5   6  -CR         Exercise 6    Exercise Name 6  standing t band flexion   -CR      Sets 6  3  -CR      Reps 6  10  -CR      Additional Comments  yellow t band  -CR         Exercise 7    Exercise Name 7  low cleans  -CR      Sets 7  3  -CR      Reps 7  10  -CR      Additional Comments  red  -CR         Exercise 8    Exercise Name 8  Re assessment included in TE   -CR        User Key  (r) = Recorded By, (t) = Taken By, (c) = Cosigned By    Initials Name Provider Type    Sang Wynne PT Physical Therapist                       PT OP Goals     Row Name 06/11/19 1400          PT Short Term Goals    STG Date to Achieve  06/13/19  -CR     STG 1  client will demonstrate independence with initial HEP  -CR     STG 1 Progress  Met  -CR     STG 2  client will demonstrate pain free full AROM   -CR     STG 2 Progress  Ongoing;Progressing  -CR     STG 3  client will demonstrate 4+/5 strength in shoulder   -CR     STG 3 Progress  Ongoing  -CR        Long Term Goals    LTG Date to Achieve  07/11/19  -CR     LTG 1  client will be independent with comprehensive HEP for self management   -CR     LTG 1 Progress  New  -CR     LTG 2  client report qDASH limited < 20%  -CR     LTG 2 Progress  Met  -CR     LTG 3  Client will demonstrate dereased TTP along LHBT  -CR     LTG 3 Progress  Ongoing  -CR       User Key  (r) = Recorded By, (t) = Taken By, (c) = Cosigned By    Initials Name Provider Type    Sang Wynne PT Physical Therapist          Therapy Education  Education Details: Updated HEP to include t band shoulder strength program. provided written HEP     Outcome Measure Options: Quick DASH  Quick DASH  Open a tight or new jar.: No Difficulty  Do heavy household chores (e.g., wash walls, wash floors): Mild Difficulty  Carry a shopping bag or briefcase: No Difficulty  Wash your back: Mild Difficulty  Use a knife to cut food: No Difficulty  Recreational activities in which you take some force or impact through your  arm, should or hand (e.g. golf, hammering, tennis, etc.): Unable  During the past week, to what extent has your arm, shoulder, or hand problem interfered with your normal social activites with family, friends, neighbors or groups?: Not at all  During the past week, were you limited in your work or other regular daily activities as a result of your arm, shoulder or hand problem?: Not limited at all  Arm, Shoulder, or hand pain: Mild  Tingling (pins and needles) in your arm, shoulder, or hand: None  During the past week, how much difficulty have you had sleeping because of the pain in your arm, shoulder or hand?: No difficulty  Number of Questions Answered: 11  Quick DASH Score: 15.91         Time Calculation:   Start Time: 1445  Therapy Charges for Today     Code Description Service Date Service Provider Modifiers Qty    38040944060 HC PT THER PROC EA 15 MIN 6/11/2019 Sang Riley, PT GP 2          PT G-Codes  Outcome Measure Options: Quick DASH  Quick DASH Score: 15.91         Sang Riley, AKIL  6/11/2019

## 2019-06-24 ENCOUNTER — HOSPITAL ENCOUNTER (OUTPATIENT)
Dept: PHYSICAL THERAPY | Facility: HOSPITAL | Age: 74
Setting detail: THERAPIES SERIES
Discharge: HOME | End: 2019-06-24

## 2019-06-24 DIAGNOSIS — M25.511 ACUTE PAIN OF RIGHT SHOULDER: Primary | ICD-10-CM

## 2019-06-24 PROCEDURE — 97110 THERAPEUTIC EXERCISES: CPT | Performed by: PHYSICAL THERAPIST

## 2019-06-24 NOTE — THERAPY TREATMENT NOTE
Outpatient Physical Therapy Ortho Treatment Note   [facility]     Patient Name: Andrea Rosales Jr.  : 1945  MRN: [id number]  Today's Date: 2019      Visit Date: 2019    Visit Dx:    ICD-10-CM ICD-9-CM   1. Acute pain of right shoulder M25.511 719.41       Patient Active Problem List   Diagnosis   • Hypertension   • Hyperlipidemia   • Osteoarthritis   • Palpitations   • Prediabetes   • Hypercholesterolemia   • Benign essential hypertension   • History of prostate cancer   • Annual physical exam   • Premature atrial contraction        Past Medical History:   Diagnosis Date   • Benign essential hypertension    • History of colonic polyps    • History of prostate cancer    • Hypercholesterolemia    • Prediabetes    • Prostate cancer (CMS/HCC)     S/P PROSTATECTOMY   • Right-sided back pain         Past Surgical History:   Procedure Laterality Date   • PROSTATECTOMY      Prostate cancer-Radical    • TONSILLECTOMY         PT Ortho     Row Name 19 1100       Subjective Comments    Subjective Comments  Client reports that he has not recieved call for schedulling by Dr. Garcia office, reports very little pain  -CR       Subjective Pain    Able to rate subjective pain?  yes  -CR       Right Upper Ext    Rt Shoulder Abduction AROM  160  -CR    Rt Shoulder Flexion AROM  160  -CR      User Key  (r) = Recorded By, (t) = Taken By, (c) = Cosigned By    Initials Name Provider Type    Sang Wynne, PT Physical Therapist                      PT Assessment/Plan     Row Name 19 0650          PT Assessment    Assessment Comments  Client demonstrates improvements in function with decreased incidence of catching as well as decreased apprehension with movement.  Progression of TE program in session today was tolerated well and client will follow up within 2 weeks regarding trial of low tennis practice shots, which was discussed in session today.   -CR       User Key  (r) = Recorded By,  (t) = Taken By, (c) = Cosigned By    Initials Name Provider Type    Sang Wynne, AKIL Physical Therapist            Exercises     Row Name 06/24/19 1100             Subjective Comments    Subjective Comments  Client reports that he has not recieved call for schedulling by Dr. Garcia office, reports very little pain  -CR         Subjective Pain    Able to rate subjective pain?  yes  -CR         Total Minutes    06681 - PT Therapeutic Exercise Minutes  30  -CR         Exercise 2    Exercise Name 2  rows  -CR      Sets 2  3  -CR      Reps 2  10  -CR      Additional Comments  green  -CR         Exercise 3    Exercise Name 3  T band IR/ER  -CR      Sets 3  3  -CR      Reps 3  10  -CR         Exercise 5    Exercise Name 5  UE ergometer  -CR      Time 5  6  -CR         Exercise 6    Exercise Name 6  standing t band flexion   -CR      Sets 6  3  -CR      Reps 6  10  -CR      Additional Comments  red  -CR         Exercise 7    Exercise Name 7  low cleans  -CR      Sets 7  3  -CR      Reps 7  10  -CR      Additional Comments  gree  -CR         Exercise 8    Exercise Name 8  PNF drawing the sword  -CR      Sets 8  2  -CR      Reps 8  10  -CR      Additional Comments  red  -CR         Exercise 9    Exercise Name 9  low swings, therabar red  -CR      Reps 9  10  -CR        User Key  (r) = Recorded By, (t) = Taken By, (c) = Cosigned By    Initials Name Provider Type    Sang Wynne PT Physical Therapist                                          Time Calculation:   Start Time: 1100  Therapy Charges for Today     Code Description Service Date Service Provider Modifiers Qty    30947622138 HC PT THER PROC EA 15 MIN 6/24/2019 Sang Riley, PT GP 2                    Sang Riley PT  6/24/2019

## 2019-06-25 ENCOUNTER — TELEPHONE (OUTPATIENT)
Dept: INTERNAL MEDICINE | Facility: CLINIC | Age: 74
End: 2019-06-25

## 2019-06-25 DIAGNOSIS — G89.29 CHRONIC RIGHT SHOULDER PAIN: Primary | ICD-10-CM

## 2019-06-25 DIAGNOSIS — M25.511 CHRONIC RIGHT SHOULDER PAIN: Primary | ICD-10-CM

## 2019-06-28 RX ORDER — ATORVASTATIN CALCIUM 40 MG/1
TABLET, FILM COATED ORAL
Qty: 90 TABLET | Refills: 3 | Status: SHIPPED | OUTPATIENT
Start: 2019-06-28 | End: 2020-07-06

## 2019-07-02 LAB
ALBUMIN SERPL-MCNC: 4.73 G/DL (ref 3.5–5.2)
ALBUMIN/GLOB SERPL: 1.8 G/DL
ALP SERPL-CCNC: 83 U/L (ref 39–117)
AST SERPL-CCNC: 45 U/L (ref 1–40)
BILIRUB SERPL-MCNC: 0.9 MG/DL (ref 0.2–1.2)
BUN SERPL-MCNC: 27 MG/DL (ref 8–23)
BUN/CREAT SERPL: 27 (ref 7–25)
CALCIUM SERPL-MCNC: 9.9 MG/DL (ref 8.6–10.5)
CHLORIDE SERPL-SCNC: 104 MMOL/L (ref 98–107)
CHOLEST SERPL-MCNC: 120 MG/DL (ref 0–200)
CREAT SERPL-MCNC: 1 MG/DL (ref 0.76–1.27)
GLOBULIN SER CALC-MCNC: 2.6 G/DL
GLUCOSE SERPL-MCNC: 90 MG/DL (ref 65–99)
HBA1C MFR BLD: 5.3 % (ref 4.8–5.6)
HDLC SERPL-MCNC: 38 MG/DL (ref 40–60)
LDLC SERPL CALC-MCNC: 75 MG/DL (ref 0–99)
POTASSIUM SERPL-SCNC: 4.5 MMOL/L (ref 3.5–5.2)
PROT SERPL-MCNC: 7.3 G/DL (ref 6–8.5)
SODIUM SERPL-SCNC: 140 MMOL/L (ref 136–145)
TRIGL SERPL-MCNC: 90 MG/DL (ref 0–150)
VLDLC SERPL CALC-MCNC: 18 MG/DL

## 2019-07-08 ENCOUNTER — HOSPITAL ENCOUNTER (OUTPATIENT)
Dept: PHYSICAL THERAPY | Facility: HOSPITAL | Age: 74
Setting detail: THERAPIES SERIES
Discharge: HOME | End: 2019-07-08

## 2019-07-08 DIAGNOSIS — M25.511 ACUTE PAIN OF RIGHT SHOULDER: Primary | ICD-10-CM

## 2019-07-08 PROCEDURE — 97110 THERAPEUTIC EXERCISES: CPT | Performed by: PHYSICAL THERAPIST

## 2019-07-08 NOTE — THERAPY PROGRESS REPORT/RE-CERT
Outpatient Physical Therapy Ortho Progress Note   Michelle     Patient Name: Andrea Rosales Jr.  : 1945  MRN: [id number]  Today's Date: 2019      Visit Date: 2019    Visit Dx:    ICD-10-CM ICD-9-CM   1. Acute pain of right shoulder M25.511 719.41       Patient Active Problem List   Diagnosis   • Hypertension   • Hyperlipidemia   • Osteoarthritis   • Palpitations   • Prediabetes   • Hypercholesterolemia   • Benign essential hypertension   • History of prostate cancer   • Annual physical exam   • Premature atrial contraction        Past Medical History:   Diagnosis Date   • Benign essential hypertension    • History of colonic polyps    • History of prostate cancer    • Hypercholesterolemia    • Prediabetes    • Prostate cancer (CMS/HCC)     S/P PROSTATECTOMY   • Right-sided back pain         Past Surgical History:   Procedure Laterality Date   • PROSTATECTOMY      Prostate cancer-Radical    • TONSILLECTOMY         PT Ortho     Row Name 19 1000       Subjective Comments    Subjective Comments  Client reports side swing tennis has not been difficulty or problematic  -CR       Subjective Pain    Able to rate subjective pain?  yes  -CR    Pre-Treatment Pain Level  0  -CR    Post-Treatment Pain Level  0  -CR       Shoulder Impingement/Rotator Cuff Special Tests    Betts-Gabriel Test (RC Lesion vs. Bursitis)  Negative  -CR    Neer Impingement Test (RC Lesion vs. Bursitis)  Negative  -CR    Full Can Test (RC Lesion)  Negative  -CR       Shoulder Girdle Palpation    Shoulder Girdle Palpation?  Yes  -CR    Long Head of Biceps  Tender  -CR       Right Upper Ext    Rt Shoulder Abduction AROM  165  -CR    Rt Shoulder Flexion AROM  165  -CR       MMT Right Upper Ext    Rt Shoulder Flexion MMT, Gross Movement  (4+/5) good plus  -CR    Rt Shoulder Extension MMT, Gross Movement  (4+/5) good plus  -CR    Rt Shoulder ABduction MMT, Gross Movement  (4+/5) good plus  -CR    Rt Shoulder Internal  Rotation MMT, Gross Movement  (4+/5) good plus  -CR    Rt Shoulder External Rotation MMT, Gross Movement  (4+/5) good plus  -CR      User Key  (r) = Recorded By, (t) = Taken By, (c) = Cosigned By    Initials Name Provider Type    Sang Wynne, PT Physical Therapist                      PT Assessment/Plan     Row Name 07/08/19 1108          PT Assessment    Functional Limitations  Performance in sport activities;Performance in leisure activities  -CR     Impairments  Muscle strength;Joint mobility  -CR     Assessment Comments  Client has consistently made progress in ROM, strength, and functional ability with DASH <5% at this time.  Today therapy services progressed with overhead activity and strnegthening with use of sleeper stretch to minimize symptoms of posterior impingement.  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   -CR     Please refer to paper survey for additional self-reported information  Yes  -CR     Rehab Potential  Good  -CR     Patient/caregiver participated in establishment of treatment plan and goals  Yes  -CR     Patient would benefit from skilled therapy intervention  Yes  -CR        PT Plan    PT Frequency  1x/week  -CR     PT Plan Comments  Continue with return to sport progression as tolerated.  Follow up on overhand swing trials  -CR       User Key  (r) = Recorded By, (t) = Taken By, (c) = Cosigned By    Initials Name Provider Type    Sang Wynne PT Physical Therapist            Exercises     Row Name 07/08/19 1000             Subjective Comments    Subjective Comments  Client reports side swing tennis has not been difficulty or problematic  -CR         Subjective Pain    Able to rate subjective pain?  yes  -CR      Pre-Treatment Pain Level  0  -CR      Post-Treatment Pain Level  0  -CR         Total Minutes    86763 - PT Therapeutic Exercise Minutes  30  -CR         Exercise 1    Exercise Name 1  Re assessment  included in TE time today  -CR         Exercise 3    Exercise Name 3  hi 5 ER yellow t band   -CR      Sets 3  2  -CR      Reps 3  10  -CR         Exercise 4    Exercise Name 4  sleeper stretch  -CR      Reps 4  5  -CR      Time 4  10  -CR         Exercise 5    Exercise Name 5  UE ergometer  -CR      Time 5  6  -CR         Exercise 6    Exercise Name 6  hi 5 bounce back   -CR      Sets 6  2  -CR      Reps 6  10  -CR         Exercise 7    Exercise Name 7  low cleans  -CR      Sets 7  3  -CR      Reps 7  10  -CR      Additional Comments  -- green  -CR         Exercise 8    Exercise Name 8  .  -CR         Exercise 9    Exercise Name 9  high swings, therabar red  -CR      Reps 9  10  -CR        User Key  (r) = Recorded By, (t) = Taken By, (c) = Cosigned By    Initials Name Provider Type    Sang Wynne, PT Physical Therapist                       PT OP Goals     Row Name 07/08/19 1000          PT Short Term Goals    STG Date to Achieve  06/13/19  -CR     STG 1  client will demonstrate independence with initial HEP  -CR     STG 1 Progress  Met  -CR     STG 2  client will demonstrate pain free full AROM   -CR     STG 2 Progress  Met  -CR     STG 3  client will demonstrate 4+/5 strength in shoulder   -CR     STG 3 Progress  Met  -CR        Long Term Goals    LTG Date to Achieve  07/11/19  -CR     LTG 1  client will be independent with comprehensive HEP for self management   -CR     LTG 1 Progress  Met  -CR     LTG 2  client report qDASH limited < 20%  -CR     LTG 2 Progress  Met  -CR     LTG 3  Client will demonstrate dereased TTP along LHBT  -CR     LTG 3 Progress  Ongoing  -CR       User Key  (r) = Recorded By, (t) = Taken By, (c) = Cosigned By    Initials Name Provider Type    Sang Wynne, PT Physical Therapist               Outcome Measure Options: Quick DASH  Quick DASH  Open a tight or new jar.: No Difficulty  Do heavy household chores (e.g., wash walls, wash floors): No Difficulty  Carry a  shopping bag or briefcase: No Difficulty  Wash your back: No Difficulty  Use a knife to cut food: No Difficulty  Recreational activities in which you take some force or impact through your arm, should or hand (e.g. golf, hammering, tennis, etc.): Mild Difficulty  During the past week, to what extent has your arm, shoulder, or hand problem interfered with your normal social activites with family, friends, neighbors or groups?: Not at all  During the past week, were you limited in your work or other regular daily activities as a result of your arm, shoulder or hand problem?: Not limited at all  Arm, Shoulder, or hand pain: Mild  Tingling (pins and needles) in your arm, shoulder, or hand: None  During the past week, how much difficulty have you had sleeping because of the pain in your arm, shoulder or hand?: No difficulty  Number of Questions Answered: 11  Quick DASH Score: 4.55         Time Calculation:   Start Time: 1015  Therapy Charges for Today     Code Description Service Date Service Provider Modifiers Qty    06922385093 HC PT THER PROC EA 15 MIN 7/8/2019 Sang Riley, PT GP 2          PT G-Codes  Outcome Measure Options: Quick DASH  Quick DASH Score: 4.55         Sang Riley, PT  7/8/2019

## 2019-07-11 ENCOUNTER — OFFICE VISIT (OUTPATIENT)
Dept: INTERNAL MEDICINE | Facility: CLINIC | Age: 74
End: 2019-07-11

## 2019-07-11 ENCOUNTER — LAB REQUISITION (OUTPATIENT)
Dept: LAB | Facility: HOSPITAL | Age: 74
End: 2019-07-11

## 2019-07-11 VITALS
HEART RATE: 62 BPM | DIASTOLIC BLOOD PRESSURE: 80 MMHG | HEIGHT: 72 IN | BODY MASS INDEX: 25.87 KG/M2 | WEIGHT: 191 LBS | TEMPERATURE: 98.7 F | SYSTOLIC BLOOD PRESSURE: 92 MMHG

## 2019-07-11 DIAGNOSIS — E78.2 MIXED HYPERLIPIDEMIA: ICD-10-CM

## 2019-07-11 DIAGNOSIS — I10 ESSENTIAL HYPERTENSION: Primary | ICD-10-CM

## 2019-07-11 DIAGNOSIS — R73.03 PREDIABETES: ICD-10-CM

## 2019-07-11 DIAGNOSIS — Z00.00 ROUTINE GENERAL MEDICAL EXAMINATION AT A HEALTH CARE FACILITY: ICD-10-CM

## 2019-07-11 PROCEDURE — 96160 PT-FOCUSED HLTH RISK ASSMT: CPT | Performed by: PHYSICIAN ASSISTANT

## 2019-07-11 PROCEDURE — 36415 COLL VENOUS BLD VENIPUNCTURE: CPT | Performed by: PHYSICIAN ASSISTANT

## 2019-07-11 PROCEDURE — G0439 PPPS, SUBSEQ VISIT: HCPCS | Performed by: PHYSICIAN ASSISTANT

## 2019-07-11 NOTE — PROGRESS NOTES
QUICK REFERENCE INFORMATION:  The ABCs of the Annual Wellness Visit    Subsequent Medicare Wellness Visit    HEALTH RISK ASSESSMENT    1945    Recent Hospitalizations:  No hospitalization(s) within the last year..        Current Medical Providers:  Patient Care Team:  Alberto Denis MD as PCP - General  Alberto Denis MD as PCP - Family Medicine        Smoking Status:  Social History     Tobacco Use   Smoking Status Never Smoker   Smokeless Tobacco Never Used       Alcohol Consumption:  Social History     Substance and Sexual Activity   Alcohol Use Yes   • Alcohol/week: 1.2 - 2.4 oz   • Types: 1 - 2 Glasses of wine, 1 - 2 Cans of beer per week    Comment: occasionally       Depression Screen:   PHQ-2/PHQ-9 Depression Screening 7/11/2019   Little interest or pleasure in doing things 0   Feeling down, depressed, or hopeless 0   Total Score 0       Health Habits and Functional and Cognitive Screening:  Functional & Cognitive Status 7/11/2019   Do you have difficulty preparing food and eating? No   Do you have difficulty bathing yourself, getting dressed or grooming yourself? No   Do you have difficulty using the toilet? No   Do you have difficulty moving around from place to place? No   Do you have trouble with steps or getting out of a bed or a chair? No   In the past year have you fallen or experienced a near fall? No   Current Diet Well Balanced Diet   Dental Exam Up to date   Eye Exam Up to date   Exercise (times per week) 3 times per week   Current Exercise Activities Include Walking   Do you need help using the phone?  No   Are you deaf or do you have serious difficulty hearing?  No   Do you need help with transportation? No   Do you need help shopping? No   Do you need help preparing meals?  No   Do you need help with housework?  No   Do you need help with laundry? No   Do you need help taking your medications? No   Do you need help managing money? No   Do you ever drive or ride in a car  without wearing a seat belt? No   Have you felt unusual stress, anger or loneliness in the last month? No   Who do you live with? Spouse   If you need help, do you have trouble finding someone available to you? No   Have you been bothered in the last four weeks by sexual problems? No   Do you have difficulty concentrating, remembering or making decisions? No           Does the patient have evidence of cognitive impairment? No    ATTENTION  What is the year: correct  What is the month of the year: correct  What is the day of the week?: correct  What is the date?: correct  MEMORY  Repeat address three times, only score third attempt: [address], [place]: 6  HOW MANY ANIMALS DID THE PATIENT NAME  Verbal Fluency -- Animal Names (0-25): 22+  CLOCK DRAWING  Clock Drawing: All Correct  MEMORY RECALL  Tell me what you remember about that name and address we were repeating at the beginnin  ACE TOTAL SCORE  Total ACE Score - <25/30 strongly suggests cognitive impairment; <21/30 almost certainly shows dementia: 29       Aspirin use counseling: Taking ASA appropriately as indicated      Recent Lab Results:  CMP:  Lab Results   Component Value Date    GLU 90 01/10/2019    BUN 27 (H) 01/10/2019    CREATININE 1.00 01/10/2019    EGFRIFNONA 73 01/10/2019    BCR 27.0 (H) 01/10/2019     01/10/2019    K 4.5 01/10/2019    CO2 28.0 01/10/2019    CALCIUM 9.9 01/10/2019    PROTENTOTREF 7.3 01/10/2019    ALBUMIN 4.73 01/10/2019    LABGLOBREF 2.6 01/10/2019    LABIL2 1.8 01/10/2019    BILITOT 0.9 01/10/2019    ALKPHOS 83 01/10/2019    AST 45 (H) 01/10/2019    ALT 52 (H) 01/10/2019     HbA1c:  Lab Results   Component Value Date    HGBA1C 5.30 01/10/2019    HGBA1C 5.30 01/10/2019     Microalbumin:  No results found for: MICROALBUR, POCMALB, POCCREAT  Lipid Panel  Lab Results   Component Value Date    CHOL 120 01/10/2019    TRIG 90 01/10/2019    HDL 38 (L) 01/10/2019    LDL 75 01/10/2019    AST 45 (H)  01/10/2019    ALT 52 (H) 01/10/2019       Visual Acuity:  No exam data present    Age-appropriate Screening Schedule:  Refer to the list below for future screening recommendations based on patient's age, sex and/or medical conditions. Orders for these recommended tests are listed in the plan section. The patient has been provided with a written plan.    Health Maintenance   Topic Date Due   • TDAP/TD VACCINES (1 - Tdap) 10/28/1963   • ZOSTER VACCINE (1 of 2) 10/28/1994   • PNEUMOCOCCAL VACCINES (65+ LOW/MEDIUM RISK) (1 of 2 - PCV13) 10/28/2009   • INFLUENZA VACCINE  08/01/2019   • COLONOSCOPY  08/25/2019   • LIPID PANEL  01/10/2020        Subjective   History of Present Illness    Andrea Rosales Jr. is a 73 y.o. male who presents for a Subsequent Wellness Visit.    CHRONIC CONDITIONS    The following portions of the patient's history were reviewed and updated as appropriate: allergies, current medications, past family history, past medical history, past social history, past surgical history and problem list.    Outpatient Medications Prior to Visit   Medication Sig Dispense Refill   • aspirin 81 MG EC tablet Take 81 mg by mouth Daily.     • atorvastatin (LIPITOR) 40 MG tablet take 1 tablet by mouth once daily 90 tablet 3   • BYSTOLIC 5 MG tablet take 1 tablet by mouth once daily 90 tablet 3   • Multiple Vitamin (MULTI-VITAMIN DAILY PO) Take 1 tablet by mouth Daily.     • valsartan-hydrochlorothiazide (DIOVAN-HCT) 80-12.5 MG per tablet Take 1 tablet by mouth Daily. Taking 1/2 tablet  Once a day  0     No facility-administered medications prior to visit.        Patient Active Problem List   Diagnosis   • Hypertension   • Hyperlipidemia   • Osteoarthritis   • Palpitations   • Prediabetes   • Hypercholesterolemia   • Benign essential hypertension   • History of prostate cancer   • Annual physical exam   • Premature atrial contraction       Advance Care Planning:  Patient does not have an advance directive -  "information provided to the patient today    Identification of Risk Factors:  Risk factors include: Cardiovascular risk.    Review of Systems   Constitutional: Negative for chills, fatigue and fever.   HENT: Negative for congestion, ear pain and sinus pressure.    Respiratory: Negative for cough, chest tightness, shortness of breath and wheezing.    Cardiovascular: Negative for chest pain and palpitations.   Gastrointestinal: Negative for abdominal pain and blood in stool.   Skin: Negative for color change.   Allergic/Immunologic: Negative for environmental allergies.   Neurological: Positive for dizziness and light-headedness. Negative for speech difficulty and headaches.   Psychiatric/Behavioral: Negative for decreased concentration. The patient is not nervous/anxious.        Compared to one year ago, the patient feels his physical health is the same.  Compared to one year ago, the patient feels his mental health is the same.    Objective     Physical Exam     Procedures     Vitals:    07/11/19 1105   BP: 92/80   BP Location: Right arm   Patient Position: Sitting   Cuff Size: Adult   Pulse: 62   Temp: 98.7 °F (37.1 °C)   TempSrc: Temporal   Weight: 86.6 kg (191 lb)   Height: 182.9 cm (72.01\")   PainSc: 0-No pain       Patient's Body mass index is 25.9 kg/m². BMI is within normal parameters. No follow-up required..      Assessment/Plan   Problem List Items Addressed This Visit        Cardiovascular and Mediastinum    Hypertension - Primary    Current Assessment & Plan     Repeat BP 98/62.  Pt is currently taking 1/2 valsartan/HCTZ.  We will discontinue medication today.  He will continue taking BP at home daily.  Followup scheduled in 1 week with Dr. Denis         Relevant Medications    BYSTOLIC 5 MG tablet    Other Relevant Orders    CBC & Differential    MicroAlbumin, Urine, Random - Urine, Clean Catch    Hyperlipidemia    Relevant Medications    atorvastatin (LIPITOR) 40 MG tablet    Other Relevant Orders "    Lipid Panel       Other    Prediabetes    Relevant Orders    Comprehensive Metabolic Panel    Hemoglobin A1c        Patient Self-Management and Personalized Health Advice  The patient has been provided with information about: prevention of cardiac or vascular disease and preventive services including:   · Advance directive.    Outpatient Encounter Medications as of 7/11/2019   Medication Sig Dispense Refill   • aspirin 81 MG EC tablet Take 81 mg by mouth Daily.     • atorvastatin (LIPITOR) 40 MG tablet take 1 tablet by mouth once daily 90 tablet 3   • BYSTOLIC 5 MG tablet take 1 tablet by mouth once daily 90 tablet 3   • Multiple Vitamin (MULTI-VITAMIN DAILY PO) Take 1 tablet by mouth Daily.     • [DISCONTINUED] valsartan-hydrochlorothiazide (DIOVAN-HCT) 80-12.5 MG per tablet Take 1 tablet by mouth Daily. Taking 1/2 tablet  Once a day  0     No facility-administered encounter medications on file as of 7/11/2019.        Reviewed use of high risk medication in the elderly: yes  Reviewed for potential of harmful drug interactions in the elderly: yes    Follow Up:  Return for Next scheduled follow up.     An After Visit Summary and PPPS with all of these plans were given to the patient.

## 2019-07-11 NOTE — ASSESSMENT & PLAN NOTE
Repeat BP 98/62.  Pt is currently taking 1/2 valsartan/HCTZ.  We will discontinue medication today.  He will continue taking BP at home daily.  Followup scheduled in 1 week with Dr. Denis

## 2019-07-12 LAB
ALBUMIN SERPL-MCNC: 4.8 G/DL (ref 3.5–5.2)
ALBUMIN/GLOB SERPL: 1.7 G/DL
ALP SERPL-CCNC: 83 U/L (ref 39–117)
ALT SERPL-CCNC: 41 U/L (ref 1–41)
AST SERPL-CCNC: 35 U/L (ref 1–40)
BASOPHILS # BLD AUTO: 0.06 10*3/MM3 (ref 0–0.2)
BASOPHILS NFR BLD AUTO: 0.6 % (ref 0–1.5)
BILIRUB SERPL-MCNC: 0.8 MG/DL (ref 0.2–1.2)
BUN SERPL-MCNC: 26 MG/DL (ref 8–23)
BUN/CREAT SERPL: 24.5 (ref 7–25)
CALCIUM SERPL-MCNC: 10 MG/DL (ref 8.6–10.5)
CHLORIDE SERPL-SCNC: 102 MMOL/L (ref 98–107)
CHOLEST SERPL-MCNC: 127 MG/DL (ref 0–200)
CO2 SERPL-SCNC: 24.9 MMOL/L (ref 22–29)
CREAT SERPL-MCNC: 1.06 MG/DL (ref 0.76–1.27)
EOSINOPHIL # BLD AUTO: 0.72 10*3/MM3 (ref 0–0.4)
EOSINOPHIL NFR BLD AUTO: 7.7 % (ref 0.3–6.2)
ERYTHROCYTE [DISTWIDTH] IN BLOOD BY AUTOMATED COUNT: 14 % (ref 12.3–15.4)
GLOBULIN SER CALC-MCNC: 2.9 GM/DL
GLUCOSE SERPL-MCNC: 110 MG/DL (ref 65–99)
HBA1C MFR BLD: 5.4 % (ref 4.8–5.6)
HCT VFR BLD AUTO: 52.6 % (ref 37.5–51)
HDLC SERPL-MCNC: 40 MG/DL (ref 40–60)
HGB BLD-MCNC: 16.9 G/DL (ref 13–17.7)
IMM GRANULOCYTES # BLD AUTO: 0.01 10*3/MM3 (ref 0–0.05)
IMM GRANULOCYTES NFR BLD AUTO: 0.1 % (ref 0–0.5)
LDLC SERPL CALC-MCNC: 70 MG/DL (ref 0–100)
LYMPHOCYTES # BLD AUTO: 2.43 10*3/MM3 (ref 0.7–3.1)
LYMPHOCYTES NFR BLD AUTO: 26.1 % (ref 19.6–45.3)
MCH RBC QN AUTO: 31.1 PG (ref 26.6–33)
MCHC RBC AUTO-ENTMCNC: 32.1 G/DL (ref 31.5–35.7)
MCV RBC AUTO: 96.9 FL (ref 79–97)
MICROALBUMIN UR-MCNC: <3 UG/ML
MONOCYTES # BLD AUTO: 0.82 10*3/MM3 (ref 0.1–0.9)
MONOCYTES NFR BLD AUTO: 8.8 % (ref 5–12)
NEUTROPHILS # BLD AUTO: 5.27 10*3/MM3 (ref 1.7–7)
NEUTROPHILS NFR BLD AUTO: 56.7 % (ref 42.7–76)
NRBC BLD AUTO-RTO: 0 /100 WBC (ref 0–0.2)
PLATELET # BLD AUTO: 151 10*3/MM3 (ref 140–450)
POTASSIUM SERPL-SCNC: 4.2 MMOL/L (ref 3.5–5.2)
PROT SERPL-MCNC: 7.7 G/DL (ref 6–8.5)
RBC # BLD AUTO: 5.43 10*6/MM3 (ref 4.14–5.8)
SODIUM SERPL-SCNC: 142 MMOL/L (ref 136–145)
TRIGL SERPL-MCNC: 87 MG/DL (ref 0–150)
VLDLC SERPL CALC-MCNC: 17.4 MG/DL
WBC # BLD AUTO: 9.31 10*3/MM3 (ref 3.4–10.8)

## 2019-07-18 ENCOUNTER — OFFICE VISIT (OUTPATIENT)
Dept: INTERNAL MEDICINE | Facility: CLINIC | Age: 74
End: 2019-07-18

## 2019-07-18 ENCOUNTER — TELEPHONE (OUTPATIENT)
Dept: INTERNAL MEDICINE | Facility: CLINIC | Age: 74
End: 2019-07-18

## 2019-07-18 VITALS
HEART RATE: 68 BPM | HEIGHT: 72 IN | SYSTOLIC BLOOD PRESSURE: 104 MMHG | WEIGHT: 190 LBS | DIASTOLIC BLOOD PRESSURE: 60 MMHG | BODY MASS INDEX: 25.73 KG/M2

## 2019-07-18 DIAGNOSIS — R73.03 PREDIABETES: ICD-10-CM

## 2019-07-18 DIAGNOSIS — E78.2 MIXED HYPERLIPIDEMIA: ICD-10-CM

## 2019-07-18 DIAGNOSIS — I49.1 PREMATURE ATRIAL CONTRACTION: ICD-10-CM

## 2019-07-18 DIAGNOSIS — Z85.46 HISTORY OF PROSTATE CANCER: ICD-10-CM

## 2019-07-18 DIAGNOSIS — I10 BENIGN ESSENTIAL HYPERTENSION: Primary | ICD-10-CM

## 2019-07-18 PROCEDURE — 99214 OFFICE O/P EST MOD 30 MIN: CPT | Performed by: INTERNAL MEDICINE

## 2019-07-18 RX ORDER — VALSARTAN AND HYDROCHLOROTHIAZIDE 80; 12.5 MG/1; MG/1
0.5 TABLET, FILM COATED ORAL DAILY
COMMUNITY
End: 2019-07-18

## 2019-07-18 RX ORDER — VALSARTAN 40 MG/1
40 TABLET ORAL DAILY
Qty: 30 TABLET | Refills: 5 | Status: SHIPPED | OUTPATIENT
Start: 2019-07-18 | End: 2020-01-13

## 2019-07-18 NOTE — PATIENT INSTRUCTIONS
Stop valsartan-hydrochlorothiazide, and take valsartan 40 mg a day along with bystolic. Report back in 2-3 weeks.

## 2019-07-18 NOTE — PROGRESS NOTES
[facility]     Andrea Rosales Jr.  1945   [id number]      Patient Care Team:  Alberto Denis MD as PCP - General  Alberto Denis MD as PCP - Family Medicine    Chief Complaint::   Chief Complaint   Patient presents with   • Hyperlipidemia   • Hypertension   • Prediabetes        HPI  Mr Rosales is now 73.  He comes in for follow-up of his hypertension, hyperlipidemia, prediabetes and for part 2 of his annual wellness visit.  Recently he has had low blood pressure in the office.  After his last visit his valsartan-hydrochlorothiazide was discontinued, but on Bystolic alone his blood pressure increased to over 140 systolic.  He resumed taking his half tablet of valsartan HCTZ and now his blood pressures back down.  His blood pressures at home of been in the 117 systolic range.  He did have one episode when he went to [place] to hit tennis balls where he got out of the car and stood up in the heat and felt transiently lightheaded.  That recovered quickly and he did not fall.  He was given Bystolic a few years ago by cardiology because of palpitations which on Holter monitor were found to be PACs.  He sees orthopedics soon for his shoulder, at the suggestion of his physical therapist.  Otherwise he feels well and remains active.    Chronic Conditions:      Patient Active Problem List   Diagnosis   • Hypertension   • Hyperlipidemia   • Osteoarthritis   • Palpitations   • Prediabetes   • Hypercholesterolemia   • Benign essential hypertension   • History of prostate cancer   • Annual physical exam   • Premature atrial contraction        Past Medical History:   Diagnosis Date   • Benign essential hypertension    • History of colonic polyps    • History of prostate cancer    • Hypercholesterolemia    • Prediabetes    • Prostate cancer (CMS/[facility])     S/P PROSTATECTOMY   • Right-sided back pain        Past Surgical History:   Procedure Laterality Date   • PROSTATECTOMY  1998     Prostate cancer-Radical    • TONSILLECTOMY         Family History   Problem Relation Age of Onset   • Breast cancer Mother    • Stroke Father    • Stomach cancer Father         Gastric   • Breast cancer Sister        Social History     Socioeconomic History   • Marital status:      Spouse name: Not on file   • Number of children: Not on file   • Years of education: Not on file   • Highest education level: Not on file   Tobacco Use   • Smoking status: Never Smoker   • Smokeless tobacco: Never Used   Substance and Sexual Activity   • Alcohol use: Yes     Alcohol/week: 1.2 - 2.4 oz     Types: 1 - 2 Glasses of wine, 1 - 2 Cans of beer per week     Comment: occasionally   • Drug use: No   • Sexual activity: Defer       No Known Allergies      Current Outpatient Medications:   •  aspirin 81 MG EC tablet, Take 81 mg by mouth Daily., Disp: , Rfl:   •  atorvastatin (LIPITOR) 40 MG tablet, take 1 tablet by mouth once daily, Disp: 90 tablet, Rfl: 3  •  BYSTOLIC 5 MG tablet, take 1 tablet by mouth once daily, Disp: 90 tablet, Rfl: 3  •  Multiple Vitamin (MULTI-VITAMIN DAILY PO), Take 1 tablet by mouth Daily., Disp: , Rfl:   •  valsartan (DIOVAN) 40 MG tablet, Take 1 tablet by mouth Daily., Disp: 30 tablet, Rfl: 5    Review of Systems   Constitutional: Negative for chills, fatigue and fever.   HENT: Negative for congestion, ear pain and sinus pressure.    Respiratory: Negative for cough, chest tightness, shortness of breath and wheezing.    Cardiovascular: Negative for chest pain and palpitations.   Gastrointestinal: Negative for abdominal pain, blood in stool and constipation.   Skin: Negative for color change.   Allergic/Immunologic: Negative for environmental allergies.   Neurological: Negative for dizziness, speech difficulty and headache.   Psychiatric/Behavioral: Negative for decreased concentration. The patient is not nervous/anxious.         Vital Signs  Vitals:    07/18/19 1404   BP: 104/60   BP Location: Right  "arm   Patient Position: Sitting   Cuff Size: Adult   Pulse: 68   Weight: 86.2 kg (190 lb)   Height: 182.9 cm (72.01\")   PainSc:   2   PainLoc: Shoulder  Comment: right       Physical Exam   Constitutional: He is oriented to person, place, and time. He appears well-developed and well-nourished.   HENT:   Head: Normocephalic and atraumatic.   Right Ear: External ear normal.   Left Ear: External ear normal.   Nose: Nose normal.   Mouth/Throat: Oropharynx is clear and moist. No oropharyngeal exudate.   Eyes: Conjunctivae and EOM are normal. Pupils are equal, round, and reactive to light.   Neck: Normal range of motion. Neck supple. No JVD present. No thyromegaly present.   Cardiovascular: Normal rate, regular rhythm, normal heart sounds and intact distal pulses. Exam reveals no gallop and no friction rub.   No murmur heard.  Pulmonary/Chest: Effort normal and breath sounds normal. No respiratory distress. He has no wheezes. He has no rales. He exhibits no tenderness.   Abdominal: Soft. Bowel sounds are normal. He exhibits no distension and no mass. There is no tenderness. There is no rebound and no guarding. No hernia.   Musculoskeletal: Normal range of motion. He exhibits no tenderness.   Lymphadenopathy:     He has no cervical adenopathy.   Neurological: He is alert and oriented to person, place, and time. He displays normal reflexes. No cranial nerve deficit or sensory deficit. He exhibits normal muscle tone. Coordination normal.   Skin: Skin is warm and dry. No rash noted. No erythema.   Scattered lentigines   Psychiatric: He has a normal mood and affect. His behavior is normal. Judgment and thought content normal.   Nursing note and vitals reviewed.     Procedures    ACE III MINI             Assessment/Plan:    Andrea was seen today for hyperlipidemia, hypertension and prediabetes.    Diagnoses and all orders for this visit:    Benign essential hypertension    Premature atrial contraction    Mixed " hyperlipidemia    Prediabetes    History of prostate cancer    Other orders  -     valsartan (DIOVAN) 40 MG tablet; Take 1 tablet by mouth Daily.    Plan    Blood pressure is low on current regimen.  He will continue Bystolic.  He will discontinue hydrochlorothiazide and continue valsartan 40 mg alone.  His blood pressure goal is a systolic of 122 130.  Since his palpitations were PACs, if this proves to be too much medication, I think we could reduce the dose of his by systolic without any problem.    Lipid panel is well within normal limits on atorvastatin he will continue working on healthy diet and exercise as well.    A1c is near normal at 5.4.  The treatment remains healthy diet, regular physical activity and avoidance of weight gain.    He sees urology for surveillance PSAs every other year now.  He sees dermatology every 6 months for surveillance of skin cancer.  Colonoscopy is scheduled for September.  He declines pneumococcal vaccine and hep A vaccine.      Plan of care reviewed with patient at the conclusion of today's visit. Education was provided regarding diagnosis, management, and any prescribed or recommended OTC medications.Patient verbalizes understanding of and agreement with management plan.         Alberto Denis MD

## 2019-07-18 NOTE — TELEPHONE ENCOUNTER
Reason for Call:        PT CALLED TO LET DR DIA KNOW HIS BLOOD PRESSURE READING SINCE HIS APPT TODAY.  MR. BRIGGS SAID THAT DR HAD ADJUSTED HIS MEDICATION BASED ON READINGS AT APPT, WANTED DR DIA TO BE KNOW.    BP WAS     122/66   AT 3:30 PM                      124/66 AT 4:30 PM TODAY

## 2019-07-22 ENCOUNTER — HOSPITAL ENCOUNTER (OUTPATIENT)
Dept: PHYSICAL THERAPY | Facility: HOSPITAL | Age: 74
Setting detail: THERAPIES SERIES
Discharge: HOME | End: 2019-07-22

## 2019-07-22 DIAGNOSIS — M25.511 ACUTE PAIN OF RIGHT SHOULDER: Primary | ICD-10-CM

## 2019-07-22 PROCEDURE — 97110 THERAPEUTIC EXERCISES: CPT | Performed by: PHYSICAL THERAPIST

## 2019-07-22 RX ORDER — NEBIVOLOL HYDROCHLORIDE 5 MG/1
TABLET ORAL
Qty: 90 TABLET | Refills: 0 | Status: SHIPPED | OUTPATIENT
Start: 2019-07-22 | End: 2019-11-04 | Stop reason: SDUPTHER

## 2019-07-25 ENCOUNTER — OFFICE VISIT (OUTPATIENT)
Dept: ORTHOPEDIC SURGERY | Facility: CLINIC | Age: 74
End: 2019-07-25

## 2019-07-25 VITALS — WEIGHT: 190.04 LBS | OXYGEN SATURATION: 98 % | HEART RATE: 76 BPM | BODY MASS INDEX: 25.74 KG/M2 | HEIGHT: 72 IN

## 2019-07-25 DIAGNOSIS — M19.011 ARTHRITIS OF RIGHT ACROMIOCLAVICULAR JOINT: ICD-10-CM

## 2019-07-25 DIAGNOSIS — IMO0002 DISORDER OF ROTATOR CUFF SYNDROME OF RIGHT SHOULDER AND ALLIED DISORDER: ICD-10-CM

## 2019-07-25 DIAGNOSIS — M75.21 BICEPS TENDINITIS OF RIGHT UPPER EXTREMITY: ICD-10-CM

## 2019-07-25 DIAGNOSIS — M25.511 RIGHT SHOULDER PAIN, UNSPECIFIED CHRONICITY: Primary | ICD-10-CM

## 2019-07-25 PROCEDURE — 99204 OFFICE O/P NEW MOD 45 MIN: CPT | Performed by: ORTHOPAEDIC SURGERY

## 2019-07-25 NOTE — PROGRESS NOTES
[facility]    Subjective     Pain of the Right Shoulder (Shoulder pain started 2mos ago. Noticed pain the day after exercising. With movements, 7/10 stabbing pain. Pt is currently doing PT. Has tried Ibuprofen OTC a few days after injury. )      GENNARO Rosales Jr. is a 73 y.o. male.  Patient is here today for evaluation of his right shoulder at the request of Dr. Denis.  He noticed the pain began after a day of exercise couple months ago.  He has been sent to physical therapy and this is improved his condition overall.  Initially, he had stabbing pain anterior laterally and in the proximal biceps region.  This was 7 out of 10 in severity.  He tried ibuprofen as well.  He initially was unable to play tennis but he has been able to resume this sport.    Past Medical History:   Diagnosis Date   • Benign essential hypertension    • History of colonic polyps    • History of prostate cancer    • Hypercholesterolemia    • Prediabetes    • Prostate cancer (CMS/HCC)     S/P PROSTATECTOMY   • Right-sided back pain       Past Surgical History:   Procedure Laterality Date   • PROSTATECTOMY  1998    Prostate cancer-Radical    • TONSILLECTOMY        Family History   Problem Relation Age of Onset   • Breast cancer Mother    • Stroke Father    • Stomach cancer Father         Gastric   • Breast cancer Sister      Social History     Socioeconomic History   • Marital status:      Spouse name: Not on file   • Number of children: Not on file   • Years of education: Not on file   • Highest education level: Not on file   Tobacco Use   • Smoking status: Never Smoker   • Smokeless tobacco: Never Used   Substance and Sexual Activity   • Alcohol use: Yes     Alcohol/week: 1.2 - 2.4 oz     Types: 1 - 2 Glasses of wine, 1 - 2 Cans of beer per week     Comment: occasionally   • Drug use: No   • Sexual activity: Defer      Current Outpatient Medications on File Prior to Visit   Medication Sig  "Dispense Refill   • aspirin 81 MG EC tablet Take 81 mg by mouth Daily.     • atorvastatin (LIPITOR) 40 MG tablet take 1 tablet by mouth once daily 90 tablet 3   • BYSTOLIC 5 MG tablet take 1 tablet by mouth once daily 90 tablet 0   • Multiple Vitamin (MULTI-VITAMIN DAILY PO) Take 1 tablet by mouth Daily.     • valsartan (DIOVAN) 40 MG tablet Take 1 tablet by mouth Daily. 30 tablet 5     No current facility-administered medications on file prior to visit.       No Known Allergies     The following portions of the patient's history were reviewed and updated as appropriate: allergies, current medications, past family history, past medical history, past social history, past surgical history and problem list.    Review of Systems   Constitutional: Negative.    HENT: Negative.    Eyes: Negative.    Respiratory: Negative.    Cardiovascular: Negative.    Gastrointestinal: Negative.    Endocrine: Negative.    Genitourinary: Negative.    Musculoskeletal: Positive for arthralgias.   Skin: Negative.    Allergic/Immunologic: Negative.    Neurological: Negative.    Hematological: Negative.    Psychiatric/Behavioral: Negative.         Objective      Physical Exam  Pulse 76   Ht 182.9 cm (72.01\")   Wt 86.2 kg (190 lb 0.6 oz)   SpO2 98%   BMI 25.77 kg/m²     Body mass index is 25.77 kg/m².    General  Mental Status - alert  General Appearance - cooperative, well groomed, not in acute distress  Orientation - Oriented X3  Build & Nutrition - well developed and well nourished  Posture - normal posture  Gait - normal gait     Integumentary  Global Assessment  Examination of related systems reveals - no lymphadenopathy  Ears:  No abnormality  Nose:  No mucous drainage  General Characteristics  Overall examination of the patient's skin reveals - no rashes, no evidence of scars, no suspicious lesions and no bruises.  Color - normal coloration of skin.  Vascular: Brisk capillary refill in all extremities    Ortho " Exam  Musculoskeletal   Upper Extremity   Right Shoulder     Inspection and Palpation:     Medial border scapular tenderness-none    AC Joint Tenderness -none    Sensation is normal    Examination reveals no ecchymosis.        Strength and Tone:    Supraspinatus - 5/5 with mild pain    External Rotators-5/5    Infraspinatus - 5/5    Subscapularis - 5/5    Deltoid - 5/5     Range of Motion      RightShoulder:    Internal Rotation: ROM - L4    External Rotation: AROM - 60 degrees    Elevation through flexion: AROM - 140 degrees        Impingement   Right shoulder    Betts-Gabriel impingement test negative    Neer impingement test negative     Functional Testing   Right shoulder    AC crossover adduction test negative    Speeds test negative    Uppercut test negative    O'Briens test negative    Drop arm sign negative    Apprehension relocation negative      Imaging/Studies  X-rays of the patient's right shoulder from epic show mild degenerative changes at the AC joint, chronic changes at the greater tuberosity consistent with chronic cuff pathology, and a type I acromion.    We have reviewed an MRI of the patient's right shoulder from epic and this shows no significant rotator cuff pathology but extensive AC joint arthritis.  He has a narrow supraspinatus outlet and subcoracoid outlet.      Assessment:  1. Right shoulder pain, unspecified chronicity    2. Disorder of rotator cuff syndrome of right shoulder and allied disorder    3. Biceps tendinitis of right upper extremity    4. Arthritis of right acromioclavicular joint        Plan:  1. Continue over-the-counter medication as needed for discomfort  2. Right AC joint arthritis--observe for now.  Major finding on MRI but at this point, he appears to be getting better.  We have talked to him about avoiding certain activities which could exacerbate this underlying condition.  3. Long head biceps tendinitis--observe for now.  Patient appears to be improving.  He should  be able to resume tennis but we have asked him to exercise caution while serving  4. Patient will follow-up when necessary going forward.        Douglas Garcia MD  07/25/19  6:27 PM

## 2019-08-22 ENCOUNTER — DOCUMENTATION (OUTPATIENT)
Dept: PHYSICAL THERAPY | Facility: HOSPITAL | Age: 74
End: 2019-08-22

## 2019-08-22 DIAGNOSIS — M25.511 ACUTE PAIN OF RIGHT SHOULDER: Primary | ICD-10-CM

## 2019-08-22 NOTE — THERAPY DISCHARGE NOTE
Outpatient Physical Therapy Discharge Summary         Patient Name: Andrea Rosales Jr.  : 1945  MRN: [id number]    Today's Date: 2019    Visit Dx:    ICD-10-CM ICD-9-CM   1. Acute pain of right shoulder M25.511 719.41       PT OP Goals     Row Name 19 1100          PT Short Term Goals    STG Date to Achieve  19  -CR     STG 1  client will demonstrate independence with initial HEP  -CR     STG 1 Progress  Met  -CR     STG 2  client will demonstrate pain free full AROM   -CR     STG 2 Progress  Met  -CR     STG 3  client will demonstrate 4+/5 strength in shoulder   -CR     STG 3 Progress  Met  -CR        Long Term Goals    LTG Date to Achieve  19  -CR     LTG 1  client will be independent with comprehensive HEP for self management   -CR     LTG 1 Progress  Met  -CR     LTG 2  client report qDASH limited < 20%  -CR     LTG 2 Progress  Met  -CR     LTG 3  Client will demonstrate dereased TTP along LHBT  -CR     LTG 3 Progress  Ongoing;Met  -CR       User Key  (r) = Recorded By, (t) = Taken By, (c) = Cosigned By    Initials Name Provider Type    Sang Wynne, PT Physical Therapist          OP PT Discharge Summary  Date of Discharge: 19  Reason for Discharge: All goals achieved  Outcomes Achieved: Able to achieve all goals within established timeline  Discharge Destination: Home with home program  Discharge Instructions/Additional Comments: Client progressed with therapy services very well.  Returned to sport activity following completion of therapy.  Has followed up with MD and will return if required in future      Time Calculation:                    Sang Riley, AKIL  2019

## 2019-09-03 DIAGNOSIS — Z12.11 SCREENING FOR COLON CANCER: Primary | ICD-10-CM

## 2019-09-10 ENCOUNTER — OUTSIDE FACILITY SERVICE (OUTPATIENT)
Dept: GASTROENTEROLOGY | Facility: CLINIC | Age: 74
End: 2019-09-10

## 2019-09-10 PROCEDURE — G0500 MOD SEDAT ENDO SERVICE >5YRS: HCPCS | Performed by: INTERNAL MEDICINE

## 2019-09-10 PROCEDURE — G0105 COLORECTAL SCRN; HI RISK IND: HCPCS | Performed by: INTERNAL MEDICINE

## 2019-09-16 ENCOUNTER — TELEPHONE (OUTPATIENT)
Dept: GASTROENTEROLOGY | Facility: CLINIC | Age: 74
End: 2019-09-16

## 2019-09-16 NOTE — TELEPHONE ENCOUNTER
PT CALLED M REGARDING ONGOING DIARRHEA  SINCE COLONOSCOPY ON 9/10/19. WOULD LIKE ADVICE OR RECOMMENDATION. Route a message to Sofía for review with Dr. Woods

## 2019-09-17 ENCOUNTER — TELEPHONE (OUTPATIENT)
Dept: INTERNAL MEDICINE | Facility: CLINIC | Age: 74
End: 2019-09-17

## 2019-09-17 ENCOUNTER — HOSPITAL ENCOUNTER (EMERGENCY)
Facility: HOSPITAL | Age: 74
Discharge: HOME | End: 2019-09-17
Attending: EMERGENCY MEDICINE | Admitting: EMERGENCY MEDICINE

## 2019-09-17 VITALS
TEMPERATURE: 98.2 F | BODY MASS INDEX: 25.73 KG/M2 | WEIGHT: 190 LBS | DIASTOLIC BLOOD PRESSURE: 72 MMHG | RESPIRATION RATE: 16 BRPM | HEIGHT: 72 IN | OXYGEN SATURATION: 98 % | SYSTOLIC BLOOD PRESSURE: 115 MMHG | HEART RATE: 82 BPM

## 2019-09-17 DIAGNOSIS — R19.7 DIARRHEA, UNSPECIFIED TYPE: ICD-10-CM

## 2019-09-17 DIAGNOSIS — K92.1 BLACK STOOL: Primary | ICD-10-CM

## 2019-09-17 DIAGNOSIS — T88.7XXA MEDICATION SIDE EFFECT: ICD-10-CM

## 2019-09-17 LAB
ALBUMIN SERPL-MCNC: 4.9 G/DL (ref 3.5–5.2)
ALBUMIN/GLOB SERPL: 1.7 G/DL
ALP SERPL-CCNC: 87 U/L (ref 39–117)
ALT SERPL W P-5'-P-CCNC: 28 U/L (ref 1–41)
ANION GAP SERPL CALCULATED.3IONS-SCNC: 12 MMOL/L (ref 5–15)
AST SERPL-CCNC: 30 U/L (ref 1–40)
BASOPHILS # BLD AUTO: 0.04 10*3/MM3 (ref 0–0.2)
BASOPHILS NFR BLD AUTO: 0.4 % (ref 0–1.5)
BILIRUB SERPL-MCNC: 1 MG/DL (ref 0.2–1.2)
BUN BLD-MCNC: 23 MG/DL (ref 8–23)
BUN/CREAT SERPL: 22.1 (ref 7–25)
CALCIUM SPEC-SCNC: 10.1 MG/DL (ref 8.6–10.5)
CHLORIDE SERPL-SCNC: 102 MMOL/L (ref 98–107)
CO2 SERPL-SCNC: 27 MMOL/L (ref 22–29)
CREAT BLD-MCNC: 1.04 MG/DL (ref 0.76–1.27)
DEPRECATED RDW RBC AUTO: 45.1 FL (ref 37–54)
DEVELOPER EXPIRATION DATE: ABNORMAL
DEVELOPER LOT NUMBER: ABNORMAL
EOSINOPHIL # BLD AUTO: 1.3 10*3/MM3 (ref 0–0.4)
EOSINOPHIL NFR BLD AUTO: 12 % (ref 0.3–6.2)
ERYTHROCYTE [DISTWIDTH] IN BLOOD BY AUTOMATED COUNT: 13.2 % (ref 12.3–15.4)
EXPIRATION DATE: ABNORMAL
FECAL OCCULT BLOOD SCREEN, POC: NEGATIVE
GFR SERPL CREATININE-BSD FRML MDRD: 70 ML/MIN/1.73
GLOBULIN UR ELPH-MCNC: 2.9 GM/DL
GLUCOSE BLD-MCNC: 96 MG/DL (ref 65–99)
HCT VFR BLD AUTO: 47.4 % (ref 37.5–51)
HGB BLD-MCNC: 15.6 G/DL (ref 13–17.7)
HOLD SPECIMEN: NORMAL
HOLD SPECIMEN: NORMAL
IMM GRANULOCYTES # BLD AUTO: 0.03 10*3/MM3 (ref 0–0.05)
IMM GRANULOCYTES NFR BLD AUTO: 0.3 % (ref 0–0.5)
LYMPHOCYTES # BLD AUTO: 2.36 10*3/MM3 (ref 0.7–3.1)
LYMPHOCYTES NFR BLD AUTO: 21.8 % (ref 19.6–45.3)
Lab: ABNORMAL
MCH RBC QN AUTO: 30.8 PG (ref 26.6–33)
MCHC RBC AUTO-ENTMCNC: 32.9 G/DL (ref 31.5–35.7)
MCV RBC AUTO: 93.7 FL (ref 79–97)
MONOCYTES # BLD AUTO: 1.03 10*3/MM3 (ref 0.1–0.9)
MONOCYTES NFR BLD AUTO: 9.5 % (ref 5–12)
NEGATIVE CONTROL: NEGATIVE
NEUTROPHILS # BLD AUTO: 6.06 10*3/MM3 (ref 1.7–7)
NEUTROPHILS NFR BLD AUTO: 56 % (ref 42.7–76)
NRBC BLD AUTO-RTO: 0 /100 WBC (ref 0–0.2)
PLATELET # BLD AUTO: 132 10*3/MM3 (ref 140–450)
PMV BLD AUTO: 9.2 FL (ref 6–12)
POSITIVE CONTROL: NEGATIVE
POTASSIUM BLD-SCNC: 4.3 MMOL/L (ref 3.5–5.2)
PROT SERPL-MCNC: 7.8 G/DL (ref 6–8.5)
RBC # BLD AUTO: 5.06 10*6/MM3 (ref 4.14–5.8)
SODIUM BLD-SCNC: 141 MMOL/L (ref 136–145)
WBC NRBC COR # BLD: 10.82 10*3/MM3 (ref 3.4–10.8)
WHOLE BLOOD HOLD SPECIMEN: NORMAL
WHOLE BLOOD HOLD SPECIMEN: NORMAL

## 2019-09-17 PROCEDURE — 99283 EMERGENCY DEPT VISIT LOW MDM: CPT

## 2019-09-17 PROCEDURE — 80053 COMPREHEN METABOLIC PANEL: CPT

## 2019-09-17 PROCEDURE — 82270 OCCULT BLOOD FECES: CPT | Performed by: EMERGENCY MEDICINE

## 2019-09-17 PROCEDURE — 85025 COMPLETE CBC W/AUTO DIFF WBC: CPT | Performed by: EMERGENCY MEDICINE

## 2019-09-17 RX ORDER — SODIUM CHLORIDE 0.9 % (FLUSH) 0.9 %
10 SYRINGE (ML) INJECTION AS NEEDED
Status: DISCONTINUED | OUTPATIENT
Start: 2019-09-17 | End: 2019-09-18 | Stop reason: HOSPADM

## 2019-09-17 NOTE — TELEPHONE ENCOUNTER
The only abnormalities at the time of his colonoscopy was diverticulosis.  It was recommended and he should use fiber on a regular basis.  He can use some Imodium for 24 to 36 hours for the diarrhea.  If his stools are indeed black he needs to go to the emergency room as this may represent upper GI bleeding.  He had no evidence of diverticulitis at the time of his exam so he should not be having abdominal pain related to the colonoscopy.    Dr. Woods

## 2019-09-17 NOTE — TELEPHONE ENCOUNTER
Pt called regarding diarrhea/black stool; slight abd. Pain but subsides after BM. Sent to Dr. Woods for review and advise.

## 2019-09-18 NOTE — DISCHARGE INSTRUCTIONS
Rest and push fluids    Stop the Pepto-Bismol    Restart your Benefiber    Stop the Imodium for the time being.  Use only as directed by Dr. Woods's office    Use a probiotic over-the-counter as discussed    Return to the ED if you have any acute, urgent, emergent, or progressive symptoms as discussed including progressive diarrhea, or any abdominal pain or fever as discussed

## 2019-09-18 NOTE — ED PROVIDER NOTES
Subjective   73-year-old white male presents emergency department with black stool    Patient had a colonoscopy a week ago.  He had a prep, and a colonoscopy done on Tuesday by Dr. Woods.  He reports intermittent diarrhea since that time.  He is taking Pepto-Bismol Imodium and Benefiber.  He reports over the last day he has had black but not tarry stools.  He denies any dizziness with standing.  He has no abdominal pain.  He has a paucity of cramping, and is only had 3 stools over the last 24 hours.    He denies any fevers chills nausea vomiting cough congestion chest pain, and denies dysuria frequency or urgency.  He said no hematuria or bleeding from any other site.  He rarely drinks any alcohol.  He was referred to the ED in view of his black stools        History provided by:  Spouse and patient  GI Problem   The primary symptoms include diarrhea and melena. Primary symptoms do not include fever, fatigue, abdominal pain, nausea, vomiting, hematemesis, hematochezia, dysuria or rash. The illness began 6 to 7 days ago. The onset was gradual.   The illness does not include chills, dysphagia, bloating, constipation or back pain. Associated medical issues do not include inflammatory bowel disease, GERD, gallstones, liver disease, alcohol abuse or PUD.       Review of Systems   Constitutional: Negative.  Negative for chills, fatigue and fever.   HENT: Negative.  Negative for rhinorrhea.    Eyes: Negative.    Respiratory: Negative.  Negative for cough and shortness of breath.    Cardiovascular: Negative.  Negative for chest pain.   Gastrointestinal: Positive for diarrhea and melena. Negative for abdominal pain, bloating, blood in stool, constipation, dysphagia, hematemesis, hematochezia, nausea and vomiting.   Genitourinary: Negative.  Negative for dysuria and hematuria.   Musculoskeletal: Negative for back pain.   Skin: Negative.  Negative for rash.   Neurological: Negative.    All other systems reviewed and are  negative.      Past Medical History:   Diagnosis Date   • Benign essential hypertension    • History of colonic polyps    • History of prostate cancer    • Hypercholesterolemia    • Prediabetes    • Prostate cancer (CMS/HCC)     S/P PROSTATECTOMY   • Right-sided back pain        No Known Allergies    Past Surgical History:   Procedure Laterality Date   • PROSTATECTOMY  1998    Prostate cancer-Radical    • TONSILLECTOMY         Family History   Problem Relation Age of Onset   • Breast cancer Mother    • Stroke Father    • Stomach cancer Father         Gastric   • Breast cancer Sister        Social History     Socioeconomic History   • Marital status:      Spouse name: Not on file   • Number of children: Not on file   • Years of education: Not on file   • Highest education level: Not on file   Tobacco Use   • Smoking status: Never Smoker   • Smokeless tobacco: Never Used   Substance and Sexual Activity   • Alcohol use: Yes     Alcohol/week: 1.2 - 2.4 oz     Types: 1 - 2 Glasses of wine, 1 - 2 Cans of beer per week     Comment: occasionally   • Drug use: No   • Sexual activity: Defer           Objective   Physical Exam   Constitutional: He appears well-developed and well-nourished. No distress.   HENT:   Head: Normocephalic and atraumatic.   Nose: Nose normal.   Mouth/Throat: Oropharynx is clear and moist.   Eyes: Conjunctivae are normal.   Neck: Normal range of motion.   Cardiovascular: Normal rate, regular rhythm, normal heart sounds and intact distal pulses.   Pulmonary/Chest: Effort normal and breath sounds normal. No respiratory distress. He has no wheezes. He has no rales. He exhibits no tenderness.   Abdominal: Soft. Bowel sounds are normal. He exhibits no distension and no mass. There is no tenderness. There is no rebound and no guarding.   Genitourinary: Rectum normal. Rectal exam shows guaiac negative stool.   Genitourinary Comments: Normal tone with an unremarkable vault with no lesions.  Black thin  stool.  Guaiac negative   Musculoskeletal: Normal range of motion.   Neurological: He is alert.   Skin: Skin is warm and dry.   Psychiatric: He has a normal mood and affect.   Nursing note and vitals reviewed.      Procedures           ED Course  ED Course as of Sep 17 2230   Tue Sep 17, 2019   2045 Discussed findings with the patient and spouse.  Stool is guaiac negative.  He has no history of recurrent GI bleeds in the past.  Colonoscopy a week ago.  He is having some intermittent diarrhea especially after a large meal and is on some new medications including Benefiber Pepto-Bismol and takes Imodium as needed.  I discussed the evaluation and initial ED plan of care.  Patient and spouse agree.  [HH]   2225 Patient serially reevaluated throughout the ED course with last reevaluation now.  He is asymptomatic at the current time.  I discussed his laboratory evaluation at the bedside.  I have asked him to call Dr. Woods for further instructions tomorrow.  We discussed however probiotics, discontinuation of the Imodium, and reinstitution of the fiber.I discussed follow-up and indications for immediate return to the EDVery pleasant responsible patient and supportive and caring spouse verbalized understanding agreement of plan of care, need for follow-up, and indications for immediate return.  [HH]      ED Course User Index  [HH] Jossue Vicente MD                  [facility]    Final diagnoses:   Black stool   Diarrhea, unspecified type   Medication side effect              Jossue Vicente MD  09/17/19 2231

## 2019-09-19 NOTE — TELEPHONE ENCOUNTER
PATIENT CALLED STATES ER WANTED HIM TO CALL US ABOUT HIS VISIT. ER NOTES ARE IN Epic. PATIENT WANTED TO BE SURE YOU AGREED WITH ER RECOMMENDATION FOR PROBIOTIC AND BENIFIBER.

## 2019-09-20 ENCOUNTER — TELEPHONE (OUTPATIENT)
Dept: GASTROENTEROLOGY | Facility: CLINIC | Age: 74
End: 2019-09-20

## 2019-11-04 RX ORDER — NEBIVOLOL HYDROCHLORIDE 5 MG/1
TABLET ORAL
Qty: 90 TABLET | Refills: 3 | Status: SHIPPED | OUTPATIENT
Start: 2019-11-04 | End: 2020-10-27

## 2019-11-22 ENCOUNTER — OFFICE VISIT (OUTPATIENT)
Dept: CARDIOLOGY | Facility: CLINIC | Age: 74
End: 2019-11-22

## 2019-11-22 VITALS
BODY MASS INDEX: 25.87 KG/M2 | SYSTOLIC BLOOD PRESSURE: 100 MMHG | HEART RATE: 77 BPM | OXYGEN SATURATION: 97 % | DIASTOLIC BLOOD PRESSURE: 68 MMHG | WEIGHT: 191 LBS | HEIGHT: 72 IN

## 2019-11-22 DIAGNOSIS — I10 BENIGN ESSENTIAL HYPERTENSION: Primary | ICD-10-CM

## 2019-11-22 DIAGNOSIS — E78.2 MIXED HYPERLIPIDEMIA: ICD-10-CM

## 2019-11-22 DIAGNOSIS — R73.03 PREDIABETES: ICD-10-CM

## 2019-11-22 PROCEDURE — 99214 OFFICE O/P EST MOD 30 MIN: CPT | Performed by: INTERNAL MEDICINE

## 2019-11-22 NOTE — PROGRESS NOTES
[facility] at [facility]  Office Progress Note  Andrea Rosales Jr.  1945      Visit Date: 11/22/19    PCP: Alberto Denis MD  [address] [address]    IDENTIFICATION: A 74 y.o. male orig from [place] retired  from [employer] at [employer]. Does financial consulting part time now.       PROBLEM LIST:    1. Hypertension.    1. 6/16 SE 10 min wnl   2. Hyperlipidemia.    1. 1/10/19  TG 90 HDL 38 LDL 75  2. 7/11/2019  TG 87 HDL 40 LDL 70  3. Palpitations   1. 2016 holter 1.7% pac  4. Carotid disease  1. 5/30/18 C US 0-49% stenosis bilaterally  5. Osteoarthritis.    6. Prostate cancer status post prostatectomy.    7. Diverticulosis       Chief Complaint   Patient presents with   • Hypertension       Allergies  No Known Allergies    Current Medications    Current Outpatient Medications:   •  aspirin 81 MG EC tablet, Take 81 mg by mouth Daily., Disp: , Rfl:   •  atorvastatin (LIPITOR) 40 MG tablet, take 1 tablet by mouth once daily, Disp: 90 tablet, Rfl: 3  •  BYSTOLIC 5 MG tablet, take 1 tablet by mouth once daily, Disp: 90 tablet, Rfl: 3  •  Multiple Vitamin (MULTI-VITAMIN DAILY PO), Take 1 tablet by mouth Daily., Disp: , Rfl:   •  valsartan (DIOVAN) 40 MG tablet, Take 1 tablet by mouth Daily., Disp: 30 tablet, Rfl: 5      History of Present Illness   Andrea Rosales Jr. is a 74 y.o. year old male here for follow up.  Reports he recently had colonoscopy and was dx w diverticulosis, is increasing fiber.   Pt denies any chest pain, dyspnea, dyspnea on exertion, orthopnea, PND, palpitations, lower extremity edema, or claudication. BP low today, he has not been checking this at home. Exercises regularly, with tennis 2x weekly, has backed off somewhat d/t R shoulder pain. Is walking several times a week. He saw an orthopedist and went to PT and his shoulder did improve, but it has worsened since after he started playing tennis again.  "      OBJECTIVE:  Vitals:    11/22/19 1431   BP: 100/68   BP Location: Right arm   Patient Position: Sitting   Pulse: 77   SpO2: 97%   Weight: 86.6 kg (191 lb)   Height: 182.9 cm (72\")     Physical Exam   Constitutional: He is oriented to person, place, and time. He appears well-developed and well-nourished. No distress.   Cardiovascular: Normal rate, regular rhythm, normal heart sounds and intact distal pulses.   No murmur heard.  Pulses:       Radial pulses are 2+ on the right side, and 2+ on the left side.        Dorsalis pedis pulses are 2+ on the right side, and 2+ on the left side.        Posterior tibial pulses are 2+ on the right side, and 2+ on the left side.   Pulmonary/Chest: Effort normal and breath sounds normal. He has no wheezes. He has no rales.   Abdominal: Soft. Bowel sounds are normal. There is no tenderness. There is no guarding.   Musculoskeletal: He exhibits no edema or tenderness.   Neurological: He is alert and oriented to person, place, and time.   Skin: Skin is warm and dry. No rash noted.   Psychiatric: He has a normal mood and affect.   Nursing note and vitals reviewed.      Diagnostic Data:  Procedures      ASSESSMENT:   Diagnosis Plan   1. Benign essential hypertension     2. Mixed hyperlipidemia     3. Prediabetes         PLAN:  1. Patient has acceptable BP. Continue current medical management. Counseled to regularly check BP at home with goal averaging <130/80. If low BPs at home would consider further decreasing antihypertensives  2. Continue statin therapy. Labs per PCP  3. Patient counseled that cardiac risk with diabetes increases with hemaglobin a1c >7. Counseled to avoid starch rich foods such as bread, pasta, potatoes, and sweets, and to avoid drinking sugary beverages.      Alberto Denis MD, thank you for referring Mr. Rosales for evaluation.  I have forwarded my electronically generated recommendations to you for review.  Please do not hesitate to call with any " questions.    Scribed for Dann Max MD by Elsa Rooney PA-C. 11/22/2019  2:58 PM   I, Dann Max MD, personally performed the services described in this documentation as scribed by the above named individual in my presence, and it is both accurate and complete.  11/22/2019  2:58 PM  Dann Max MD, [facility]

## 2020-01-13 RX ORDER — VALSARTAN 40 MG/1
40 TABLET ORAL DAILY
Qty: 90 TABLET | Refills: 0 | Status: SHIPPED | OUTPATIENT
Start: 2020-01-13 | End: 2020-12-09

## 2020-01-13 RX ORDER — VALSARTAN 40 MG/1
TABLET ORAL
Qty: 90 TABLET | Refills: 0 | Status: SHIPPED | OUTPATIENT
Start: 2020-01-13 | End: 2020-01-13 | Stop reason: SDUPTHER

## 2020-01-23 ENCOUNTER — LAB REQUISITION (OUTPATIENT)
Dept: LAB | Facility: HOSPITAL | Age: 75
End: 2020-01-23

## 2020-01-23 ENCOUNTER — OFFICE VISIT (OUTPATIENT)
Dept: INTERNAL MEDICINE | Facility: CLINIC | Age: 75
End: 2020-01-23

## 2020-01-23 VITALS
SYSTOLIC BLOOD PRESSURE: 112 MMHG | BODY MASS INDEX: 25.47 KG/M2 | HEIGHT: 72 IN | HEART RATE: 72 BPM | WEIGHT: 188 LBS | DIASTOLIC BLOOD PRESSURE: 72 MMHG

## 2020-01-23 DIAGNOSIS — M19.211 LOCALIZED, SECONDARY OSTEOARTHRITIS OF THE SHOULDER REGION, RIGHT: ICD-10-CM

## 2020-01-23 DIAGNOSIS — R73.03 PREDIABETES: ICD-10-CM

## 2020-01-23 DIAGNOSIS — I10 ESSENTIAL HYPERTENSION: Primary | ICD-10-CM

## 2020-01-23 DIAGNOSIS — E78.2 MIXED HYPERLIPIDEMIA: ICD-10-CM

## 2020-01-23 DIAGNOSIS — Z00.00 ROUTINE GENERAL MEDICAL EXAMINATION AT A HEALTH CARE FACILITY: ICD-10-CM

## 2020-01-23 PROCEDURE — 99214 OFFICE O/P EST MOD 30 MIN: CPT | Performed by: INTERNAL MEDICINE

## 2020-01-23 PROCEDURE — 36415 COLL VENOUS BLD VENIPUNCTURE: CPT | Performed by: INTERNAL MEDICINE

## 2020-01-23 NOTE — PROGRESS NOTES
[facility]     Andrea Rosales Jr.  1945   [id number]      Patient Care Team:  Alberto Denis MD as PCP - General  Alberto Denis MD as PCP - Family Medicine    Chief Complaint::   Chief Complaint   Patient presents with   • Hyperlipidemia   • Hypertension   • Prediabetes        HPI  Mr. Rosales comes in for follow-up of his hypertension, prediabetes, hyperlipidemia and osteoarthritis of the right shoulder.  His shoulder discomfort has improved.  He is now able to play tennis.  He is still unable to do floor exercises such as planks because it hurts the shoulder.  Physical therapy did help.  His blood pressure has been consistently normal.  His strength and stamina are good.  He has no complaints today.    Chronic Conditions:      Patient Active Problem List   Diagnosis   • Hypertension   • Hyperlipidemia   • Osteoarthritis   • Palpitations   • Prediabetes   • Hypercholesterolemia   • Benign essential hypertension   • History of prostate cancer   • Annual physical exam   • Premature atrial contraction   • Localized, secondary osteoarthritis of the shoulder region, right        Past Medical History:   Diagnosis Date   • Arthritis Shoulder, 2019   • Benign essential hypertension    • Cataract Forming, 2018/19   • Diverticulosis Diverticulosis, 9/2019   • GERD (gastroesophageal reflux disease) Appeared to have GERD about 3 years ago   • History of colonic polyps    • History of prostate cancer    • HL (hearing loss) For many years, hearing aid 10,2019, no difference   • Hypercholesterolemia    • Prediabetes    • Prostate cancer (CMS/[facility])     S/P PROSTATECTOMY   • Right-sided back pain    • Visual impairment Driving at night, outside city       Past Surgical History:   Procedure Laterality Date   • COLONOSCOPY  09/2019   • PROSTATECTOMY  1998    Prostate cancer-Radical    • TONSILLECTOMY         Family History   Problem Relation Age of Onset   • Breast cancer Mother    • Cancer  Mother         Breast cancer, then spread   • Stroke Father    • Stomach cancer Father         Gastric   • Alcohol abuse Father         But not out of hand   • Cancer Father         Bladder   • Breast cancer Sister    • Cancer Sister         Breast cancer, then spread       Social History     Socioeconomic History   • Marital status:      Spouse name: Not on file   • Number of children: Not on file   • Years of education: Not on file   • Highest education level: Not on file   Tobacco Use   • Smoking status: Never Smoker   • Smokeless tobacco: Never Used   Substance and Sexual Activity   • Alcohol use: Yes     Alcohol/week: 1.0 standard drinks     Types: 1 Cans of beer per week     Comment: Only  drink alcoholic beverages occasionally   • Drug use: No   • Sexual activity: Never       No Known Allergies      Current Outpatient Medications:   •  aspirin 81 MG EC tablet, Take 81 mg by mouth Daily., Disp: , Rfl:   •  atorvastatin (LIPITOR) 40 MG tablet, take 1 tablet by mouth once daily, Disp: 90 tablet, Rfl: 3  •  BYSTOLIC 5 MG tablet, take 1 tablet by mouth once daily, Disp: 90 tablet, Rfl: 3  •  Multiple Vitamin (MULTI-VITAMIN DAILY PO), Take 1 tablet by mouth Daily., Disp: , Rfl:   •  valsartan (DIOVAN) 40 MG tablet, Take 1 tablet by mouth Daily., Disp: 90 tablet, Rfl: 0    Review of Systems   Constitutional: Negative for chills, fatigue and fever.   HENT: Negative for congestion, ear pain and sinus pressure.    Respiratory: Negative for cough, chest tightness, shortness of breath and wheezing.    Cardiovascular: Negative for chest pain and palpitations.   Gastrointestinal: Negative for abdominal pain, blood in stool and constipation.   Skin: Negative for color change.   Allergic/Immunologic: Negative for environmental allergies.   Neurological: Negative for dizziness, speech difficulty and headache.   Psychiatric/Behavioral: Negative for decreased concentration. The patient is not nervous/anxious.      "    Vital Signs  Vitals:    01/23/20 1113   BP: 112/72   BP Location: Right arm   Patient Position: Sitting   Cuff Size: Adult   Pulse: 72   Weight: 85.3 kg (188 lb)   Height: 182.9 cm (72.01\")   PainSc:   4   PainLoc: Shoulder  Comment: right       Physical Exam   Constitutional: He is oriented to person, place, and time. He appears well-developed and well-nourished.   HENT:   Head: Normocephalic and atraumatic.   Cardiovascular: Normal rate, regular rhythm and normal heart sounds.   No murmur heard.  Pulmonary/Chest: Effort normal and breath sounds normal.   Neurological: He is alert and oriented to person, place, and time.   Psychiatric: He has a normal mood and affect.   Vitals reviewed.     Procedures    ACE III MINI             Assessment/Plan:    Andrea was seen today for hyperlipidemia, hypertension and prediabetes.    Diagnoses and all orders for this visit:    Essential hypertension  -     Comprehensive Metabolic Panel; Future  -     Comprehensive Metabolic Panel    Prediabetes  -     Hemoglobin A1c; Future  -     Hemoglobin A1c    Mixed hyperlipidemia  -     Lipid Panel; Future  -     Lipid Panel    Localized, secondary osteoarthritis of the shoulder region, right    Plan    Blood pressure is well controlled on Bystolic and valsartan.    A1c is pending, treatment remains low-carb diet and avoidance of weight gain.    Lipid panel is pending, continue healthy diet and regular exercise.    Shoulder arthritis has actually improved.  He will continue working on strength and flexibility.      Plan of care reviewed with patient at the conclusion of today's visit. Education was provided regarding diagnosis, management, and any prescribed or recommended OTC medications.Patient verbalizes understanding of and agreement with management plan.         Alberto Denis MD           "

## 2020-01-24 LAB
ALBUMIN SERPL-MCNC: 5 G/DL (ref 3.5–5.2)
ALBUMIN/GLOB SERPL: 2.5 G/DL
ALP SERPL-CCNC: 74 U/L (ref 39–117)
ALT SERPL-CCNC: 39 U/L (ref 1–41)
AST SERPL-CCNC: 41 U/L (ref 1–40)
BILIRUB SERPL-MCNC: 0.9 MG/DL (ref 0.2–1.2)
BUN SERPL-MCNC: 22 MG/DL (ref 8–23)
BUN/CREAT SERPL: 21.6 (ref 7–25)
CALCIUM SERPL-MCNC: 9.7 MG/DL (ref 8.6–10.5)
CHLORIDE SERPL-SCNC: 103 MMOL/L (ref 98–107)
CHOLEST SERPL-MCNC: 108 MG/DL (ref 0–200)
CO2 SERPL-SCNC: 23.1 MMOL/L (ref 22–29)
CREAT SERPL-MCNC: 1.02 MG/DL (ref 0.76–1.27)
GLOBULIN SER CALC-MCNC: 2 GM/DL
GLUCOSE SERPL-MCNC: 101 MG/DL (ref 65–99)
HBA1C MFR BLD: 5.7 % (ref 4.8–5.6)
HDLC SERPL-MCNC: 41 MG/DL (ref 40–60)
LDLC SERPL CALC-MCNC: 54 MG/DL (ref 0–100)
POTASSIUM SERPL-SCNC: 4.4 MMOL/L (ref 3.5–5.2)
PROT SERPL-MCNC: 7 G/DL (ref 6–8.5)
SODIUM SERPL-SCNC: 140 MMOL/L (ref 136–145)
TRIGL SERPL-MCNC: 66 MG/DL (ref 0–150)
VLDLC SERPL CALC-MCNC: 13.2 MG/DL

## 2020-07-06 RX ORDER — ATORVASTATIN CALCIUM 40 MG/1
TABLET, FILM COATED ORAL
Qty: 90 TABLET | Refills: 3 | Status: SHIPPED | OUTPATIENT
Start: 2020-07-06 | End: 2021-06-15 | Stop reason: SDUPTHER

## 2020-07-24 ENCOUNTER — OFFICE VISIT (OUTPATIENT)
Dept: INTERNAL MEDICINE | Facility: CLINIC | Age: 75
End: 2020-07-24

## 2020-07-24 ENCOUNTER — LAB REQUISITION (OUTPATIENT)
Dept: LAB | Facility: HOSPITAL | Age: 75
End: 2020-07-24

## 2020-07-24 VITALS
DIASTOLIC BLOOD PRESSURE: 80 MMHG | WEIGHT: 186.2 LBS | HEART RATE: 72 BPM | BODY MASS INDEX: 25.22 KG/M2 | SYSTOLIC BLOOD PRESSURE: 110 MMHG | HEIGHT: 72 IN | TEMPERATURE: 96.9 F

## 2020-07-24 DIAGNOSIS — Z85.46 HISTORY OF PROSTATE CANCER: ICD-10-CM

## 2020-07-24 DIAGNOSIS — Z00.00 ROUTINE GENERAL MEDICAL EXAMINATION AT A HEALTH CARE FACILITY: ICD-10-CM

## 2020-07-24 DIAGNOSIS — E78.00 HYPERCHOLESTEROLEMIA: ICD-10-CM

## 2020-07-24 DIAGNOSIS — R73.9 HYPERGLYCEMIA: ICD-10-CM

## 2020-07-24 DIAGNOSIS — Z00.00 ANNUAL PHYSICAL EXAM: Primary | ICD-10-CM

## 2020-07-24 DIAGNOSIS — I10 BENIGN ESSENTIAL HYPERTENSION: ICD-10-CM

## 2020-07-24 PROCEDURE — G0439 PPPS, SUBSEQ VISIT: HCPCS | Performed by: INTERNAL MEDICINE

## 2020-07-24 PROCEDURE — 36415 COLL VENOUS BLD VENIPUNCTURE: CPT

## 2020-07-24 PROCEDURE — 96160 PT-FOCUSED HLTH RISK ASSMT: CPT | Performed by: INTERNAL MEDICINE

## 2020-07-24 PROCEDURE — 99397 PER PM REEVAL EST PAT 65+ YR: CPT | Performed by: INTERNAL MEDICINE

## 2020-07-24 NOTE — PROGRESS NOTES
QUICK REFERENCE INFORMATION:  The ABCs of the Annual Wellness Visit    Subsequent Medicare Wellness Visit    HEALTH RISK ASSESSMENT    1945    Recent Hospitalizations:  No hospitalization(s) within the last year..        Current Medical Providers:  Patient Care Team:  Alberto Denis MD as PCP - General  Alberto Denis MD as PCP - Family Medicine        Smoking Status:  Social History     Tobacco Use   Smoking Status Never Smoker   Smokeless Tobacco Never Used       Alcohol Consumption:  Social History     Substance and Sexual Activity   Alcohol Use Yes   • Alcohol/week: 1.0 standard drinks   • Types: 1 Cans of beer per week    Comment: Only  drink alcoholic beverages occasionally       Depression Screen:   PHQ-2/PHQ-9 Depression Screening 7/24/2020   Little interest or pleasure in doing things 0   Feeling down, depressed, or hopeless 0   Total Score 0       Health Habits and Functional and Cognitive Screening:  Functional & Cognitive Status 7/24/2020   Do you have difficulty preparing food and eating? No   Do you have difficulty bathing yourself, getting dressed or grooming yourself? No   Do you have difficulty using the toilet? No   Do you have difficulty moving around from place to place? No   Do you have trouble with steps or getting out of a bed or a chair? No   Current Diet Well Balanced Diet   Dental Exam Up to date   Eye Exam Up to date   Exercise (times per week) 4 times per week   Current Exercise Activities Include Walking   Do you need help using the phone?  No   Are you deaf or do you have serious difficulty hearing?  No   Do you need help with transportation? No   Do you need help shopping? No   Do you need help preparing meals?  No   Do you need help with housework?  No   Do you need help with laundry? No   Do you need help taking your medications? No   Do you need help managing money? No   Do you ever drive or ride in a car without wearing a seat belt? No   Have you felt unusual  stress, anger or loneliness in the last month? No   Who do you live with? Spouse   If you need help, do you have trouble finding someone available to you? No   Have you been bothered in the last four weeks by sexual problems? No   Do you have difficulty concentrating, remembering or making decisions? No       Fall Risk Screen:  RADHA Fall Risk Assessment was completed, and patient is at LOW risk for falls.Assessment completed on:7/24/2020    ACE III MINI        Does the patient have evidence of cognitive impairment? Yes    Aspirin use counseling: Taking ASA appropriately as indicated    Recent Lab Results:  CMP:  Lab Results   Component Value Date     (H) 01/23/2020    BUN 22 01/23/2020    CREATININE 1.02 01/23/2020    EGFRIFNONA 71 01/23/2020    EGFRIFAFRI 86 01/23/2020    BCR 21.6 01/23/2020     01/23/2020    K 4.4 01/23/2020    CO2 23.1 01/23/2020    CALCIUM 9.7 01/23/2020    PROTENTOTREF 7.0 01/23/2020    ALBUMIN 5.00 01/23/2020    LABGLOBREF 2.0 01/23/2020    LABIL2 2.5 01/23/2020    BILITOT 0.9 01/23/2020    ALKPHOS 74 01/23/2020    AST 41 (H) 01/23/2020    ALT 39 01/23/2020     HbA1c:  Lab Results   Component Value Date    HGBA1C 5.70 (H) 01/23/2020    HGBA1C 5.40 07/11/2019     Microalbumin:  Lab Results   Component Value Date    MICROALBUR <3.0 07/11/2019     Lipid Panel  Lab Results   Component Value Date    CHOL 120 01/10/2019    TRIG 66 01/23/2020    HDL 41 01/23/2020    LDL 54 01/23/2020    AST 41 (H) 01/23/2020    ALT 39 01/23/2020       Visual Acuity:  No exam data present    Age-appropriate Screening Schedule:  Refer to the list below for future screening recommendations based on patient's age, sex and/or medical conditions. Orders for these recommended tests are listed in the plan section. The patient has been provided with a written plan.    Health Maintenance   Topic Date Due   • TDAP/TD VACCINES (1 - Tdap) 10/28/1955   • ZOSTER VACCINE (1 of 2) 10/28/1994   • COLONOSCOPY  08/25/2019    • INFLUENZA VACCINE  08/01/2020   • LIPID PANEL  01/23/2021        Subjective   History of Present Illness    Andrea Rosales Jr. is a 74 y.o. male who presents for a Subsequent Wellness Visit.    CHRONIC CONDITIONS    The following portions of the patient's history were reviewed and updated as appropriate: allergies, current medications, past family history, past medical history, past social history, past surgical history and problem list.    Outpatient Medications Prior to Visit   Medication Sig Dispense Refill   • aspirin 81 MG EC tablet Take 81 mg by mouth Daily.     • atorvastatin (LIPITOR) 40 MG tablet TAKE 1 TABLET BY MOUTH ONCE DAILY 90 tablet 3   • BYSTOLIC 5 MG tablet take 1 tablet by mouth once daily 90 tablet 3   • Multiple Vitamin (MULTI-VITAMIN DAILY PO) Take 1 tablet by mouth Daily.     • valsartan (DIOVAN) 40 MG tablet Take 1 tablet by mouth Daily. (Patient taking differently: Take 40 mg by mouth Every Other Day.) 90 tablet 0     No facility-administered medications prior to visit.        Patient Active Problem List   Diagnosis   • Hypertension   • Hyperlipidemia   • Osteoarthritis   • Palpitations   • Prediabetes   • Hypercholesterolemia   • Benign essential hypertension   • History of prostate cancer   • Annual physical exam   • Premature atrial contraction   • Localized, secondary osteoarthritis of the shoulder region, right       Advance Care Planning:  ACP discussion was held with the patient during this visit. Patient has an advance directive (not in EMR), copy requested.    Identification of Risk Factors:  Risk factors include: Advance Directive Discussion  Cardiovascular risk.    Review of Systems   Constitutional: Negative for chills, fatigue and fever.   HENT: Negative for congestion, ear pain and sinus pressure.    Respiratory: Negative for cough, chest tightness, shortness of breath and wheezing.    Cardiovascular: Negative for chest pain and palpitations.   Gastrointestinal: Negative  for abdominal pain, blood in stool and constipation.   Skin: Negative for color change.   Allergic/Immunologic: Negative for environmental allergies.   Neurological: Negative for dizziness, speech difficulty and headaches.   Psychiatric/Behavioral: Negative for confusion. The patient is not nervous/anxious.        Compared to one year ago, the patient feels his physical health is the same.  Compared to one year ago, the patient feels his mental health is the same.    Objective     Physical Exam   Constitutional: He is oriented to person, place, and time. He appears well-developed and well-nourished.   HENT:   Head: Normocephalic and atraumatic.   Right Ear: External ear normal.   Left Ear: External ear normal.   Nose: Nose normal.   Mouth/Throat: Oropharynx is clear and moist. No oropharyngeal exudate.   Eyes: Pupils are equal, round, and reactive to light. Conjunctivae and EOM are normal.   Neck: Normal range of motion. Neck supple. No JVD present. No thyromegaly present.   Cardiovascular: Normal rate, regular rhythm, normal heart sounds and intact distal pulses. Exam reveals no gallop and no friction rub.   No murmur heard.  Pulmonary/Chest: Effort normal and breath sounds normal. No respiratory distress. He has no wheezes. He has no rales. He exhibits no tenderness.   Abdominal: Soft. Bowel sounds are normal. He exhibits no distension and no mass. There is no tenderness. There is no rebound and no guarding. No hernia.   Musculoskeletal: Normal range of motion. He exhibits no tenderness.   Lymphadenopathy:     He has no cervical adenopathy.   Neurological: He is alert and oriented to person, place, and time. He displays normal reflexes. No cranial nerve deficit or sensory deficit. He exhibits normal muscle tone. Coordination normal.   Skin: Skin is warm and dry. No rash noted. No erythema.   Psychiatric: He has a normal mood and affect. His behavior is normal. Judgment and thought content normal.   Nursing note  "and vitals reviewed.       Procedures     Vitals:    07/24/20 1053   BP: 110/80   BP Location: Left arm   Patient Position: Sitting   Cuff Size: Adult   Pulse: 72   Temp: 96.9 °F (36.1 °C)   Weight: 84.5 kg (186 lb 3.2 oz)   Height: 182.9 cm (72.01\")   PainSc: 0-No pain       Patient's Body mass index is 25.25 kg/m². BMI is within normal parameters. No follow-up required..      Assessment/Plan   Problem List Items Addressed This Visit        Cardiovascular and Mediastinum    Hypercholesterolemia    Relevant Medications    atorvastatin (LIPITOR) 40 MG tablet    Other Relevant Orders    Lipid Panel    Benign essential hypertension    Relevant Medications    BYSTOLIC 5 MG tablet    valsartan (DIOVAN) 40 MG tablet    Other Relevant Orders    CBC & Differential    Comprehensive Metabolic Panel    Microalbumin / Creatinine Urine Ratio - Urine, Clean Catch       Other    History of prostate cancer    Annual physical exam - Primary      Other Visit Diagnoses     Hyperglycemia        Relevant Orders    Hemoglobin A1c        Patient Self-Management and Personalized Health Advice  The patient has been provided with information about: diet, exercise and prevention of cardiac or vascular disease and preventive services including:   · Annual Wellness Visit (AWV).    Outpatient Encounter Medications as of 7/24/2020   Medication Sig Dispense Refill   • aspirin 81 MG EC tablet Take 81 mg by mouth Daily.     • atorvastatin (LIPITOR) 40 MG tablet TAKE 1 TABLET BY MOUTH ONCE DAILY 90 tablet 3   • BYSTOLIC 5 MG tablet take 1 tablet by mouth once daily 90 tablet 3   • Multiple Vitamin (MULTI-VITAMIN DAILY PO) Take 1 tablet by mouth Daily.     • valsartan (DIOVAN) 40 MG tablet Take 1 tablet by mouth Daily. (Patient taking differently: Take 40 mg by mouth Every Other Day.) 90 tablet 0     No facility-administered encounter medications on file as of 7/24/2020.      Plan    Overall he remains in excellent health with good lifestyle habits.  " Colonoscopy was done last year, follow-up is not indicated.  He remains active despite osteoarthritis in both shoulders, has seen orthopedics and physical therapy.      Blood pressure is well controlled on Bystolic and every other day valsartan.  He has had no instances of hypotension.  Is not clear at this point if he needs the valsartan but if he wants to try to get off of it he needs to monitor his blood pressure every day.  His systolic blood pressure goal is 1 20-1 30.    Lipid panel is pending, he will continue atorvastatin as well as healthy diet and regular exercise.    A1c is pending, see dietary instructions above.    He is due to see Dr. Otoole this year for follow-up of his history of prostate cancer.    Reviewed use of high risk medication in the elderly: yes  Reviewed for potential of harmful drug interactions in the elderly: yes    Follow Up:  Return in about 6 months (around 1/24/2021) for follow up fasting.     There are no Patient Instructions on file for this visit.    An After Visit Summary and PPPS with all of these plans were given to the patient.         Answers for HPI/ROS submitted by the patient on 7/23/2020   What is the primary reason for your visit?: Other  Please describe your symptoms.: None  Have you had these symptoms before?: No  How long have you been having these symptoms?: 1-4 days  Please list any medications you are currently taking for this condition.: None  Please describe any probable cause for these symptoms. : None

## 2020-07-25 LAB
ALBUMIN SERPL-MCNC: 4.5 G/DL (ref 3.7–4.7)
ALBUMIN/CREAT UR: 6 MG/G CREAT (ref 0–29)
ALBUMIN/GLOB SERPL: 1.7 {RATIO} (ref 1.2–2.2)
ALP SERPL-CCNC: 77 IU/L (ref 39–117)
ALT SERPL-CCNC: 39 IU/L (ref 0–44)
AST SERPL-CCNC: 41 IU/L (ref 0–40)
BASOPHILS # BLD AUTO: 0.1 X10E3/UL (ref 0–0.2)
BASOPHILS NFR BLD AUTO: 1 %
BILIRUB SERPL-MCNC: 0.8 MG/DL (ref 0–1.2)
BUN SERPL-MCNC: 19 MG/DL (ref 8–27)
BUN/CREAT SERPL: 20 (ref 10–24)
CALCIUM SERPL-MCNC: 9.7 MG/DL (ref 8.6–10.2)
CHLORIDE SERPL-SCNC: 106 MMOL/L (ref 96–106)
CHOLEST SERPL-MCNC: 112 MG/DL (ref 100–199)
CO2 SERPL-SCNC: 25 MMOL/L (ref 20–29)
CREAT SERPL-MCNC: 0.94 MG/DL (ref 0.76–1.27)
CREAT UR-MCNC: 222.6 MG/DL
EOSINOPHIL # BLD AUTO: 0.6 X10E3/UL (ref 0–0.4)
EOSINOPHIL NFR BLD AUTO: 7 %
ERYTHROCYTE [DISTWIDTH] IN BLOOD BY AUTOMATED COUNT: 13.1 % (ref 11.6–15.4)
GLOBULIN SER CALC-MCNC: 2.6 G/DL (ref 1.5–4.5)
GLUCOSE SERPL-MCNC: 102 MG/DL (ref 65–99)
HBA1C MFR BLD: 5.4 % (ref 4.8–5.6)
HCT VFR BLD AUTO: 46.8 % (ref 37.5–51)
HDLC SERPL-MCNC: 40 MG/DL
HGB BLD-MCNC: 16 G/DL (ref 13–17.7)
IMM GRANULOCYTES # BLD AUTO: 0 X10E3/UL (ref 0–0.1)
IMM GRANULOCYTES NFR BLD AUTO: 0 %
LDLC SERPL CALC-MCNC: 57 MG/DL (ref 0–99)
LYMPHOCYTES # BLD AUTO: 2.2 X10E3/UL (ref 0.7–3.1)
LYMPHOCYTES NFR BLD AUTO: 24 %
MCH RBC QN AUTO: 31.4 PG (ref 26.6–33)
MCHC RBC AUTO-ENTMCNC: 34.2 G/DL (ref 31.5–35.7)
MCV RBC AUTO: 92 FL (ref 79–97)
MICROALBUMIN UR-MCNC: 13 UG/ML
MONOCYTES # BLD AUTO: 0.8 X10E3/UL (ref 0.1–0.9)
MONOCYTES NFR BLD AUTO: 9 %
NEUTROPHILS # BLD AUTO: 5.5 X10E3/UL (ref 1.4–7)
NEUTROPHILS NFR BLD AUTO: 59 %
PLATELET # BLD AUTO: 142 X10E3/UL (ref 150–450)
POTASSIUM SERPL-SCNC: 4.5 MMOL/L (ref 3.5–5.2)
PROT SERPL-MCNC: 7.1 G/DL (ref 6–8.5)
RBC # BLD AUTO: 5.1 X10E6/UL (ref 4.14–5.8)
SODIUM SERPL-SCNC: 144 MMOL/L (ref 134–144)
TRIGL SERPL-MCNC: 75 MG/DL (ref 0–149)
VLDLC SERPL CALC-MCNC: 15 MG/DL (ref 5–40)
WBC # BLD AUTO: 9.2 X10E3/UL (ref 3.4–10.8)

## 2020-10-27 RX ORDER — NEBIVOLOL HYDROCHLORIDE 5 MG/1
TABLET ORAL
Qty: 90 TABLET | Refills: 3 | Status: SHIPPED | OUTPATIENT
Start: 2020-10-27 | End: 2021-09-10

## 2020-12-09 RX ORDER — VALSARTAN 40 MG/1
40 TABLET ORAL DAILY
Qty: 90 TABLET | Refills: 3 | Status: SHIPPED | OUTPATIENT
Start: 2020-12-09

## 2021-01-28 ENCOUNTER — OFFICE VISIT (OUTPATIENT)
Dept: INTERNAL MEDICINE | Facility: CLINIC | Age: 76
End: 2021-01-28

## 2021-01-28 VITALS
HEART RATE: 72 BPM | DIASTOLIC BLOOD PRESSURE: 78 MMHG | WEIGHT: 183.2 LBS | BODY MASS INDEX: 24.81 KG/M2 | HEIGHT: 72 IN | TEMPERATURE: 97.7 F | SYSTOLIC BLOOD PRESSURE: 136 MMHG

## 2021-01-28 DIAGNOSIS — I10 BENIGN ESSENTIAL HYPERTENSION: Primary | ICD-10-CM

## 2021-01-28 DIAGNOSIS — E78.2 MIXED HYPERLIPIDEMIA: ICD-10-CM

## 2021-01-28 DIAGNOSIS — R73.03 PREDIABETES: ICD-10-CM

## 2021-01-28 PROCEDURE — 99214 OFFICE O/P EST MOD 30 MIN: CPT | Performed by: INTERNAL MEDICINE

## 2021-01-28 NOTE — PROGRESS NOTES
[facility]     Andrea Rosales Jr.  1945   [id number]      Patient Care Team:  Alberto Denis MD as PCP - General  Alberto Denis MD as PCP - Family Medicine    Chief Complaint::   Chief Complaint   Patient presents with   • Hyperlipidemia   • Hypertension        HPI  Mr Rosales comes in for follow up of his hypertension, hyperlipidemia and prediabetes.  He feels well, remains active.  Play tennis regularly.  There is no fever, cough, shortness of breath or chest pain    Chronic Conditions:      Patient Active Problem List   Diagnosis   • Hypertension   • Hyperlipidemia   • Osteoarthritis   • Palpitations   • Prediabetes   • Hypercholesterolemia   • Benign essential hypertension   • History of prostate cancer   • Annual physical exam   • Premature atrial contraction   • Localized, secondary osteoarthritis of the shoulder region, right        Past Medical History:   Diagnosis Date   • Arthritis Shoulder, 2019   • Benign essential hypertension    • Cataract Forming, 2018/19   • Diverticulosis Diverticulosis, 9/2019   • GERD (gastroesophageal reflux disease) Appeared to have GERD about 3 years ago   • History of colonic polyps    • History of prostate cancer    • HL (hearing loss) For many years, hearing aid 10,2019, no difference   • Hypercholesterolemia    • Prediabetes    • Prostate cancer (CMS/[facility])     S/P PROSTATECTOMY   • Right-sided back pain    • Visual impairment Driving at night, outside city       Past Surgical History:   Procedure Laterality Date   • COLONOSCOPY  09/2019   • PROSTATECTOMY  1998    Prostate cancer-Radical    • TONSILLECTOMY         Family History   Problem Relation Age of Onset   • Breast cancer Mother    • Cancer Mother         Breast cancer, then spread   • Stroke Father    • Stomach cancer Father         Gastric   • Alcohol abuse Father         But not out of hand   • Cancer Father         Bladder   • Breast cancer Sister    • Cancer Sister          Breast cancer, then spread       Social History     Socioeconomic History   • Marital status:      Spouse name: Not on file   • Number of children: Not on file   • Years of education: Not on file   • Highest education level: Not on file   Tobacco Use   • Smoking status: Never Smoker   • Smokeless tobacco: Never Used   Substance and Sexual Activity   • Alcohol use: Yes     Alcohol/week: 1.0 standard drinks     Types: 1 Cans of beer per week     Comment: Only  drink alcoholic beverages occasionally   • Drug use: No   • Sexual activity: Never       No Known Allergies      Current Outpatient Medications:   •  aspirin 81 MG EC tablet, Take 81 mg by mouth Daily., Disp: , Rfl:   •  atorvastatin (LIPITOR) 40 MG tablet, TAKE 1 TABLET BY MOUTH ONCE DAILY, Disp: 90 tablet, Rfl: 3  •  Bystolic 5 MG tablet, TAKE 1 TABLET BY MOUTH ONCE DAILY, Disp: 90 tablet, Rfl: 3  •  Multiple Vitamin (MULTI-VITAMIN DAILY PO), Take 1 tablet by mouth Daily., Disp: , Rfl:   •  valsartan (DIOVAN) 40 MG tablet, TAKE 1 TABLET BY MOUTH DAILY, Disp: 90 tablet, Rfl: 3    Review of Systems   Constitutional: Negative for activity change, appetite change, diaphoresis, fatigue, unexpected weight gain and unexpected weight loss.   HENT: Negative for hearing loss.    Eyes: Negative for visual disturbance.   Respiratory: Negative for chest tightness and shortness of breath.    Cardiovascular: Negative for chest pain, palpitations and leg swelling.   Gastrointestinal: Negative for abdominal pain, blood in stool, GERD and indigestion.   Endocrine: Negative for cold intolerance and heat intolerance.   Genitourinary: Negative for dysuria and hematuria.   Musculoskeletal: Negative for arthralgias and myalgias.   Skin: Negative for skin lesions.   Neurological: Negative for tremors, seizures, syncope, speech difficulty, weakness, headache, memory problem and confusion.   Hematological: Does not bruise/bleed easily.   Psychiatric/Behavioral: Negative for  "sleep disturbance and depressed mood. The patient is not nervous/anxious.         Vital Signs  Vitals:    01/28/21 1012   BP: 136/78   BP Location: Left arm   Patient Position: Sitting   Cuff Size: Adult   Pulse: 72   Temp: 97.7 °F (36.5 °C)   Weight: 83.1 kg (183 lb 3.2 oz)   Height: 182.9 cm (72.01\")   PainSc: 0-No pain       Physical Exam  Vitals signs reviewed.   Constitutional:       Appearance: He is well-developed.   HENT:      Head: Normocephalic and atraumatic.   Cardiovascular:      Rate and Rhythm: Normal rate and regular rhythm.      Heart sounds: Normal heart sounds. No murmur.   Pulmonary:      Effort: Pulmonary effort is normal.      Breath sounds: Normal breath sounds.   Neurological:      Mental Status: He is alert and oriented to person, place, and time.      Plan    Blood pressure remains well controlled on valsartan and bystolic.     Lipid panel is pending, he will continue atorvastatin and healthy diet.     A1c is pending, the treatment remains healthy diet and avoidance of weight gain.     Procedures    ACE III MINI             Assessment/Plan:    Diagnoses and all orders for this visit:    1. Benign essential hypertension (Primary)  -     Comprehensive Metabolic Panel; Future  -     Comprehensive Metabolic Panel    2. Mixed hyperlipidemia  -     Lipid Panel; Future  -     Lipid Panel    3. Prediabetes  -     Hemoglobin A1c; Future  -     Hemoglobin A1c          Plan of care reviewed with patient at the conclusion of today's visit. Education was provided regarding diagnosis, management, and any prescribed or recommended OTC medications.Patient verbalizes understanding of and agreement with management plan.         Alberto Denis MD           Answers for HPI/ROS submitted by the patient on 1/27/2021   What is the primary reason for your visit?: Physical    "

## 2021-01-29 LAB
ALBUMIN SERPL-MCNC: 4.7 G/DL (ref 3.5–5.2)
ALBUMIN/GLOB SERPL: 1.9 G/DL
ALP SERPL-CCNC: 75 U/L (ref 39–117)
ALT SERPL-CCNC: 36 U/L (ref 1–41)
AST SERPL-CCNC: 41 U/L (ref 1–40)
BILIRUB SERPL-MCNC: 0.7 MG/DL (ref 0–1.2)
BUN SERPL-MCNC: 23 MG/DL (ref 8–23)
BUN/CREAT SERPL: 24.7 (ref 7–25)
CALCIUM SERPL-MCNC: 9.9 MG/DL (ref 8.6–10.5)
CHLORIDE SERPL-SCNC: 105 MMOL/L (ref 98–107)
CHOLEST SERPL-MCNC: 113 MG/DL (ref 0–200)
CO2 SERPL-SCNC: 27 MMOL/L (ref 22–29)
CREAT SERPL-MCNC: 0.93 MG/DL (ref 0.76–1.27)
GLOBULIN SER CALC-MCNC: 2.5 GM/DL
GLUCOSE SERPL-MCNC: 103 MG/DL (ref 65–99)
HBA1C MFR BLD: 5.6 % (ref 4.8–5.6)
HDLC SERPL-MCNC: 45 MG/DL (ref 40–60)
LDLC SERPL CALC-MCNC: 54 MG/DL (ref 0–100)
POTASSIUM SERPL-SCNC: 4.7 MMOL/L (ref 3.5–5.2)
PROT SERPL-MCNC: 7.2 G/DL (ref 6–8.5)
SODIUM SERPL-SCNC: 142 MMOL/L (ref 136–145)
TRIGL SERPL-MCNC: 65 MG/DL (ref 0–150)
VLDLC SERPL CALC-MCNC: 14 MG/DL (ref 5–40)

## 2021-03-12 ENCOUNTER — OFFICE VISIT (OUTPATIENT)
Dept: CARDIOLOGY | Facility: CLINIC | Age: 76
End: 2021-03-12

## 2021-03-12 VITALS
SYSTOLIC BLOOD PRESSURE: 118 MMHG | BODY MASS INDEX: 26.04 KG/M2 | DIASTOLIC BLOOD PRESSURE: 62 MMHG | HEART RATE: 71 BPM | OXYGEN SATURATION: 97 % | WEIGHT: 186 LBS | HEIGHT: 71 IN

## 2021-03-12 DIAGNOSIS — I10 ESSENTIAL HYPERTENSION: Primary | ICD-10-CM

## 2021-03-12 DIAGNOSIS — E78.2 MIXED HYPERLIPIDEMIA: ICD-10-CM

## 2021-03-12 PROCEDURE — 99214 OFFICE O/P EST MOD 30 MIN: CPT | Performed by: INTERNAL MEDICINE

## 2021-03-12 NOTE — PROGRESS NOTES
"[facility] at [facility]  Office Progress Note  Andrea Rosales Jr.  1945      Visit Date: 03/12/21    PCP: Alberto Denis MD  [address] [address]    IDENTIFICATION: A 75 y.o. male orig from [place] retired  from [employer] at [employer]. Does financial consulting part time now.       PROBLEM LIST:    1. Hypertension.    1. 6/16 SE 10 min wnl   2. Hyperlipidemia.    1. 1/10/19  TG 90 HDL 38 LDL 75  2. 7/11/2019  TG 87 HDL 40 LDL 70  3. Palpitations   1. 2016 holter 1.7% pac  4. Carotid disease  1. 5/30/18 C US 0-49% stenosis bilaterally  5. Osteoarthritis.    6. Prostate cancer status post prostatectomy.    7. Diverticulosis       Chief Complaint   Patient presents with   • Hypertension       Allergies  No Known Allergies    Current Medications    Current Outpatient Medications:   •  aspirin 81 MG EC tablet, Take 81 mg by mouth Daily., Disp: , Rfl:   •  atorvastatin (LIPITOR) 40 MG tablet, TAKE 1 TABLET BY MOUTH ONCE DAILY, Disp: 90 tablet, Rfl: 3  •  Bystolic 5 MG tablet, TAKE 1 TABLET BY MOUTH ONCE DAILY, Disp: 90 tablet, Rfl: 3  •  Multiple Vitamin (MULTI-VITAMIN DAILY PO), Take 1 tablet by mouth Daily., Disp: , Rfl:   •  valsartan (DIOVAN) 40 MG tablet, TAKE 1 TABLET BY MOUTH DAILY, Disp: 90 tablet, Rfl: 3      History of Present Illness   Andrea Rosales Jr. is a 75 y.o. year old male here for follow up.  Limited out-of-doors activity and exercise during Covid pandemic  Pt denies any chest pain, dyspnea, dyspnea on exertion, orthopnea, PND, palpitations, lower extremity edema, or claudication. BP low today, he has not been checking this at home.      OBJECTIVE:  Vitals:    03/12/21 1526   BP: 118/62   BP Location: Left arm   Patient Position: Sitting   Pulse: 71   SpO2: 97%   Weight: 84.4 kg (186 lb)   Height: 180.3 cm (71\")     Physical Exam  Vitals and nursing note reviewed.   Constitutional:       General: He is not in acute " distress.     Appearance: He is well-developed.   Cardiovascular:      Rate and Rhythm: Normal rate and regular rhythm.      Pulses: Intact distal pulses.           Radial pulses are 2+ on the right side and 2+ on the left side.        Dorsalis pedis pulses are 2+ on the right side and 2+ on the left side.        Posterior tibial pulses are 2+ on the right side and 2+ on the left side.      Heart sounds: Normal heart sounds. No murmur.   Pulmonary:      Effort: Pulmonary effort is normal.      Breath sounds: Normal breath sounds. No wheezing or rales.   Abdominal:      General: Bowel sounds are normal.      Palpations: Abdomen is soft.      Tenderness: There is no abdominal tenderness. There is no guarding.   Musculoskeletal:         General: No tenderness.   Skin:     General: Skin is warm and dry.      Findings: No rash.   Neurological:      Mental Status: He is alert and oriented to person, place, and time.         Diagnostic Data:  Procedures      ASSESSMENT:   Diagnosis Plan   1. Essential hypertension     2. Mixed hyperlipidemia         PLAN:  1. Patient has acceptable BP. Continue current medical management. Counseled to regularly check BP at home with goal averaging <130/80. If low BPs at home would consider further decreasing antihypertensives  2. Continue statin therapy. Labs per PCP  3. Patient counseled that cardiac risk with diabetes increases with hemaglobin a1c >7. Counseled to avoid starch rich foods such as bread, pasta, potatoes, and sweets, and to avoid drinking sugary beverages.      Alberto Denis MD, thank you for referring Mr. Rosales for evaluation.  I have forwarded my electronically generated recommendations to you for review.  Please do not hesitate to call with any questions.      3/12/2021  15:35 EST  Dann Max MD, [facility]

## 2021-04-07 ENCOUNTER — PATIENT OUTREACH (OUTPATIENT)
Dept: PHARMACY | Facility: HOSPITAL | Age: 76
End: 2021-04-07

## 2021-04-07 NOTE — OUTREACH NOTE
Medication Adherence Call    Patient was called today to discuss medication adherence with valsartan, as the patient was identified as having care opportunities.     The patient did not answer. I will try again at a later date.    July Castillo, PharmD  04/07/21

## 2021-04-09 ENCOUNTER — OFFICE VISIT (OUTPATIENT)
Dept: INTERNAL MEDICINE | Facility: CLINIC | Age: 76
End: 2021-04-09

## 2021-04-09 ENCOUNTER — HOSPITAL ENCOUNTER (OUTPATIENT)
Dept: GENERAL RADIOLOGY | Facility: HOSPITAL | Age: 76
Discharge: HOME | End: 2021-04-09
Admitting: INTERNAL MEDICINE

## 2021-04-09 VITALS
WEIGHT: 184 LBS | HEART RATE: 70 BPM | SYSTOLIC BLOOD PRESSURE: 108 MMHG | TEMPERATURE: 98.6 F | DIASTOLIC BLOOD PRESSURE: 72 MMHG | BODY MASS INDEX: 25.76 KG/M2 | HEIGHT: 71 IN

## 2021-04-09 DIAGNOSIS — M54.50 CHRONIC LEFT-SIDED LOW BACK PAIN WITHOUT SCIATICA: Primary | ICD-10-CM

## 2021-04-09 DIAGNOSIS — R07.89 CHEST WALL PAIN: ICD-10-CM

## 2021-04-09 DIAGNOSIS — G89.29 CHRONIC LEFT-SIDED LOW BACK PAIN WITHOUT SCIATICA: Primary | ICD-10-CM

## 2021-04-09 LAB
BILIRUB BLD-MCNC: NEGATIVE MG/DL
CLARITY, POC: CLEAR
COLOR UR: YELLOW
GLUCOSE UR STRIP-MCNC: NEGATIVE MG/DL
KETONES UR QL: NEGATIVE
LEUKOCYTE EST, POC: NEGATIVE
NITRITE UR-MCNC: NEGATIVE MG/ML
PH UR: 5.5 [PH] (ref 5–8)
PROT UR STRIP-MCNC: NEGATIVE MG/DL
RBC # UR STRIP: NEGATIVE /UL
SP GR UR: 1.03 (ref 1–1.03)
UROBILINOGEN UR QL: NORMAL

## 2021-04-09 PROCEDURE — 81003 URINALYSIS AUTO W/O SCOPE: CPT | Performed by: INTERNAL MEDICINE

## 2021-04-09 PROCEDURE — 99213 OFFICE O/P EST LOW 20 MIN: CPT | Performed by: INTERNAL MEDICINE

## 2021-04-09 PROCEDURE — 71046 X-RAY EXAM CHEST 2 VIEWS: CPT

## 2021-04-09 NOTE — PROGRESS NOTES
[facility]     Andrea Rosales Jr.  1945   [id number]      Patient Care Team:  Alberto Denis MD as PCP - General  Alberto Denis MD as PCP - Family Medicine    Chief Complaint::   Chief Complaint   Patient presents with   • Back Pain     Left side        HPI  Mr. Rosales comes in complaining of left-sided back pain for 4 weeks.  There is been no injury.  The pain seems to be worse with some twisting and movement and particularly when he first gets up in the morning.  He is able to play tennis and anticipate another more vigorous exercise without any problem.  There is no dysuria.  There are no GI side effects such as nausea, vomiting, change in bowel habits or bleeding.  He has had no weight loss.    Chronic Conditions:      Patient Active Problem List   Diagnosis   • Hypertension   • Hyperlipidemia   • Osteoarthritis   • Palpitations   • Prediabetes   • Hypercholesterolemia   • Benign essential hypertension   • History of prostate cancer   • Annual physical exam   • Premature atrial contraction   • Localized, secondary osteoarthritis of the shoulder region, right        Past Medical History:   Diagnosis Date   • Arthritis Shoulder, 2019   • Benign essential hypertension    • Cataract Forming, 2018/19   • Diverticulosis Diverticulosis, 9/2019   • GERD (gastroesophageal reflux disease) Appeared to have GERD about 3 years ago   • History of colonic polyps    • History of prostate cancer    • HL (hearing loss) For many years, hearing aid 10,2019, no difference   • Hypercholesterolemia    • Prediabetes    • Prostate cancer (CMS/[facility])     S/P PROSTATECTOMY   • Right-sided back pain    • Visual impairment Driving at night, outside city       Past Surgical History:   Procedure Laterality Date   • COLONOSCOPY  09/2019   • PROSTATECTOMY  1998    Prostate cancer-Radical    • TONSILLECTOMY         Family History   Problem Relation Age of Onset   • Breast cancer Mother    • Cancer Mother   "       Breast cancer, then spread   • Stroke Father    • Stomach cancer Father         Gastric   • Alcohol abuse Father         But not out of hand   • Cancer Father         Bladder   • Breast cancer Sister    • Cancer Sister         Breast cancer, then spread       Social History     Socioeconomic History   • Marital status:      Spouse name: Not on file   • Number of children: Not on file   • Years of education: Not on file   • Highest education level: Not on file   Tobacco Use   • Smoking status: Never Smoker   • Smokeless tobacco: Never Used   Vaping Use   • Vaping Use: Never used   Substance and Sexual Activity   • Alcohol use: Yes     Alcohol/week: 1.0 standard drinks     Types: 1 Cans of beer per week     Comment: Only  drink alcoholic beverages occasionally   • Drug use: No   • Sexual activity: Never       No Known Allergies      Current Outpatient Medications:   •  aspirin 81 MG EC tablet, Take 81 mg by mouth Daily., Disp: , Rfl:   •  atorvastatin (LIPITOR) 40 MG tablet, TAKE 1 TABLET BY MOUTH ONCE DAILY, Disp: 90 tablet, Rfl: 3  •  Bystolic 5 MG tablet, TAKE 1 TABLET BY MOUTH ONCE DAILY, Disp: 90 tablet, Rfl: 3  •  Multiple Vitamin (MULTI-VITAMIN DAILY PO), Take 1 tablet by mouth Daily., Disp: , Rfl:   •  valsartan (DIOVAN) 40 MG tablet, TAKE 1 TABLET BY MOUTH DAILY, Disp: 90 tablet, Rfl: 3    Review of Systems   Constitutional: Negative.    Respiratory: Negative.  Negative for chest tightness and shortness of breath.    Cardiovascular: Negative.  Negative for chest pain.   Gastrointestinal: Negative for abdominal pain, blood in stool, constipation and diarrhea.   Musculoskeletal: Positive for back pain.        Vital Signs  Vitals:    04/09/21 1313   BP: 108/72   Pulse: 70   Temp: 98.6 °F (37 °C)   Weight: 83.5 kg (184 lb)   Height: 180.3 cm (70.98\")   PainSc:   2       Physical Exam  Vitals reviewed.   Constitutional:       Appearance: He is well-developed.   HENT:      Head: Normocephalic and " atraumatic.   Cardiovascular:      Rate and Rhythm: Normal rate and regular rhythm.      Heart sounds: Normal heart sounds. No murmur heard.     Pulmonary:      Effort: Pulmonary effort is normal.      Breath sounds: Normal breath sounds.   Chest:      Breasts:         Left: No tenderness.   Abdominal:      Tenderness: There is no left CVA tenderness.   Neurological:      Mental Status: He is alert and oriented to person, place, and time.          Procedures    ACE III MINI             Assessment/Plan:    Diagnoses and all orders for this visit:    1. Chronic left-sided low back pain without sciatica (Primary)  -     POC Urinalysis Dipstick, Automated    2. Chest wall pain  -     XR Chest PA & Lateral; Future    Plan    Urinalysis is negative, the character of his discomfort is musculoskeletal.  Will obtain a chest x-ray to rule out underlying rib or lung abnormalities.  If that is negative I think at this point the given the lack of limitation or other systemic symptoms, we should monitor for a few more weeks before further work-up is undertaken.      Plan of care reviewed with patient at the conclusion of today's visit. Education was provided regarding diagnosis, management, and any prescribed or recommended OTC medications.Patient verbalizes understanding of and agreement with management plan.         Alberto Denis MD

## 2021-04-23 ENCOUNTER — TELEPHONE (OUTPATIENT)
Dept: INTERNAL MEDICINE | Facility: CLINIC | Age: 76
End: 2021-04-23

## 2021-04-23 NOTE — TELEPHONE ENCOUNTER
PATIENT WOULD LIKE A CALLBACK FROM A NURSE. HE STATES THAT DOCTOR DIA TOLD HIM TO WAIT AND CALL BACK IF THE PAIN IN HIS SIDE DID NOT GO AWAY. THE PATIENT STATES THAT HE SAW DOCTOR DIA 04/09/2021. PLEASE CALL THE PATIENT TO LET HIM KNOW WHAT HIS NEXT STEP SHOULD BE.      CALL [phone]

## 2021-04-23 NOTE — TELEPHONE ENCOUNTER
Patient states that pain has not went away on left side. Patient states PCP told him to wait a few weeks and if not improved to call back

## 2021-04-26 NOTE — TELEPHONE ENCOUNTER
PATIENT CALLED BACK AND STATED THAT HE DOES NOT THINK HE NEEDED TO COME IN FOR THAT APPOINTMENT ON 04/28/2021 UNLESS DR. GALLEGOS BELIEVES THAT HE SHOULD     PLEASE LET HIM KNOW IF HE SHOULD CANCEL THE APPOINTMENT OR NOT     PLEASE ADVISE [phone]

## 2021-04-26 NOTE — TELEPHONE ENCOUNTER
Pt returned call. Pain is about the same. He has appt with Dr. Denis 4/28, but wants to know if Dr. Denis needs to see him or what the next step is? Pt notified Dr. Denis is out today

## 2021-04-28 ENCOUNTER — OFFICE VISIT (OUTPATIENT)
Dept: INTERNAL MEDICINE | Facility: CLINIC | Age: 76
End: 2021-04-28

## 2021-04-28 VITALS
HEIGHT: 71 IN | WEIGHT: 185.2 LBS | BODY MASS INDEX: 25.93 KG/M2 | HEART RATE: 72 BPM | TEMPERATURE: 97.1 F | DIASTOLIC BLOOD PRESSURE: 58 MMHG | SYSTOLIC BLOOD PRESSURE: 112 MMHG

## 2021-04-28 DIAGNOSIS — R07.89 LEFT-SIDED CHEST WALL PAIN: Primary | ICD-10-CM

## 2021-04-28 PROCEDURE — 99213 OFFICE O/P EST LOW 20 MIN: CPT | Performed by: INTERNAL MEDICINE

## 2021-04-28 NOTE — PROGRESS NOTES
[facility]     Andrea Rosales Jr.  1945   [id number]      Patient Care Team:  Alberto Denis MD as PCP - General  Alberto Denis MD as PCP - Family Medicine    Chief Complaint::   Chief Complaint   Patient presents with   • Back Pain     left side   • Hyperlipidemia   • Hypertension        HPI  Mr. Covington was seen 2 to 3 weeks ago with complaints of persistent left sided back pain.  It was not limiting and did not bother him when he was active or playing tennis.  Chest x-ray what is obtained which was negative and we decided to watch it for a few weeks.  He stated late last week that the pain was no better an appointment was made for him to come in.  Over the weekend however he had complete resolution of the discomfort and only mild discomfort today.    Chronic Conditions:      Patient Active Problem List   Diagnosis   • Hypertension   • Hyperlipidemia   • Osteoarthritis   • Palpitations   • Prediabetes   • Hypercholesterolemia   • Benign essential hypertension   • History of prostate cancer   • Annual physical exam   • Premature atrial contraction   • Localized, secondary osteoarthritis of the shoulder region, right        Past Medical History:   Diagnosis Date   • Arthritis Shoulder, 2019   • Benign essential hypertension    • Cataract Forming, 2018/19   • Diverticulosis Diverticulosis, 9/2019   • GERD (gastroesophageal reflux disease) Appeared to have GERD about 3 years ago   • History of colonic polyps    • History of prostate cancer    • HL (hearing loss) For many years, hearing aid 10,2019, no difference   • Hypercholesterolemia    • Prediabetes    • Prostate cancer (CMS/[facility])     S/P PROSTATECTOMY   • Right-sided back pain    • Visual impairment Driving at night, outside city       Past Surgical History:   Procedure Laterality Date   • COLONOSCOPY  09/2019   • PROSTATECTOMY  1998    Prostate cancer-Radical    • TONSILLECTOMY         Family History   Problem Relation Age  "of Onset   • Breast cancer Mother    • Cancer Mother         Breast cancer, then spread   • Stroke Father    • Stomach cancer Father         Gastric   • Alcohol abuse Father         But not out of hand   • Cancer Father         Bladder   • Breast cancer Sister    • Cancer Sister         Breast cancer, then spread       Social History     Socioeconomic History   • Marital status:      Spouse name: Not on file   • Number of children: Not on file   • Years of education: Not on file   • Highest education level: Not on file   Tobacco Use   • Smoking status: Never Smoker   • Smokeless tobacco: Never Used   Vaping Use   • Vaping Use: Never used   Substance and Sexual Activity   • Alcohol use: Yes     Alcohol/week: 1.0 standard drinks     Types: 1 Cans of beer per week     Comment: Only  drink alcoholic beverages occasionally   • Drug use: No   • Sexual activity: Never       No Known Allergies      Current Outpatient Medications:   •  aspirin 81 MG EC tablet, Take 81 mg by mouth Daily., Disp: , Rfl:   •  atorvastatin (LIPITOR) 40 MG tablet, TAKE 1 TABLET BY MOUTH ONCE DAILY, Disp: 90 tablet, Rfl: 3  •  Bystolic 5 MG tablet, TAKE 1 TABLET BY MOUTH ONCE DAILY, Disp: 90 tablet, Rfl: 3  •  Multiple Vitamin (MULTI-VITAMIN DAILY PO), Take 1 tablet by mouth Daily., Disp: , Rfl:   •  valsartan (DIOVAN) 40 MG tablet, TAKE 1 TABLET BY MOUTH DAILY, Disp: 90 tablet, Rfl: 3    Review of Systems   Constitutional: Negative.    Respiratory: Negative.  Negative for chest tightness and shortness of breath.    Cardiovascular: Negative.  Negative for chest pain.   Gastrointestinal: Negative for abdominal pain, blood in stool, constipation and diarrhea.   Musculoskeletal: Positive for back pain.        Vital Signs  Vitals:    04/28/21 1009   BP: 112/58   BP Location: Right arm   Patient Position: Sitting   Cuff Size: Adult   Pulse: 72   Temp: 97.1 °F (36.2 °C)   Weight: 84 kg (185 lb 3.2 oz)   Height: 180.3 cm (70.98\")   PainSc:   2 "   PainLoc: Back       Physical Exam  Vitals reviewed.   Constitutional:       Appearance: He is well-developed.   HENT:      Head: Normocephalic and atraumatic.   Cardiovascular:      Rate and Rhythm: Normal rate and regular rhythm.      Heart sounds: Normal heart sounds. No murmur heard.     Pulmonary:      Effort: Pulmonary effort is normal.      Breath sounds: Normal breath sounds.   Chest:      Comments: There is minimal tenderness to palpation in the left mid axillary region over the sixth and seventh ribs.  Neurological:      Mental Status: He is alert and oriented to person, place, and time.          Procedures    ACE III MINI             Assessment/Plan:    Diagnoses and all orders for this visit:    1. Left-sided chest wall pain (Primary)    Plan     This still appears to be chest wall inflammation.  Given the fact that it was so much better this weekend will put off any further work-up, but if he continues to have discomfort in 2 to 3 weeks we will proceed to CT scan.      Plan of care reviewed with patient at the conclusion of today's visit. Education was provided regarding diagnosis, management, and any prescribed or recommended OTC medications.Patient verbalizes understanding of and agreement with management plan.         Alberto Denis MD           Answers for HPI/ROS submitted by the patient on 4/27/2021  What is the primary reason for your visit?: Other  Please describe your symptoms.: Pain in side  Have you had these symptoms before?: No  How long have you been having these symptoms?: Greater than 2 weeks  Please list any medications you are currently taking for this condition.: None  Please describe any probable cause for these symptoms. : Unknown

## 2021-06-02 ENCOUNTER — OFFICE VISIT (OUTPATIENT)
Dept: INTERNAL MEDICINE | Facility: CLINIC | Age: 76
End: 2021-06-02

## 2021-06-02 VITALS
HEIGHT: 71 IN | DIASTOLIC BLOOD PRESSURE: 80 MMHG | OXYGEN SATURATION: 99 % | TEMPERATURE: 98 F | WEIGHT: 187.4 LBS | BODY MASS INDEX: 26.23 KG/M2 | HEART RATE: 78 BPM | RESPIRATION RATE: 16 BRPM | SYSTOLIC BLOOD PRESSURE: 126 MMHG

## 2021-06-02 DIAGNOSIS — M25.561 ACUTE PAIN OF RIGHT KNEE: Primary | ICD-10-CM

## 2021-06-02 PROCEDURE — 99213 OFFICE O/P EST LOW 20 MIN: CPT | Performed by: INTERNAL MEDICINE

## 2021-06-02 NOTE — PROGRESS NOTES
[facility]     Andrea Rosales Jr.  1945   [id number]      Patient Care Team:  Alberto Denis MD as PCP - General  Alberto Denis MD as PCP - Family Medicine    Chief Complaint::   Chief Complaint   Patient presents with   • Knee Pain     right knee only, started Sunday morning, can not think of cause   • Insect Bite     about 6 days, unsure if lump around elbow is related        HPI  Mr. Rosales comes in with concerns about pain in the right knee.  He is back to playing tennis and is noted that sometimes it is stiff and painful.  It is not limiting but he is concerned about the discomfort.  He also has what he feels may be insect bites at the left elbow.    Chronic Conditions:      Patient Active Problem List   Diagnosis   • Hypertension   • Hyperlipidemia   • Osteoarthritis   • Palpitations   • Prediabetes   • Hypercholesterolemia   • Benign essential hypertension   • History of prostate cancer   • Annual physical exam   • Premature atrial contraction   • Localized, secondary osteoarthritis of the shoulder region, right        Past Medical History:   Diagnosis Date   • Arthritis Shoulder, 2019   • Benign essential hypertension    • Cataract Forming, 2018/19   • Diverticulosis Diverticulosis, 9/2019   • GERD (gastroesophageal reflux disease) Appeared to have GERD about 3 years ago   • History of colonic polyps    • History of prostate cancer    • HL (hearing loss) For many years, hearing aid 10,2019, no difference   • Hypercholesterolemia    • Prediabetes    • Prostate cancer (CMS/[facility])     S/P PROSTATECTOMY   • Right-sided back pain    • Visual impairment Driving at night, outside city       Past Surgical History:   Procedure Laterality Date   • COLONOSCOPY  09/2019   • PROSTATECTOMY  1998    Prostate cancer-Radical    • TONSILLECTOMY         Family History   Problem Relation Age of Onset   • Breast cancer Mother    • Cancer Mother         Breast cancer, then spread   • Stroke  "Father    • Stomach cancer Father         Gastric   • Alcohol abuse Father         But not out of hand   • Cancer Father         Bladder   • Breast cancer Sister    • Cancer Sister         Breast cancer, then spread       Social History     Socioeconomic History   • Marital status:      Spouse name: Not on file   • Number of children: Not on file   • Years of education: Not on file   • Highest education level: Not on file   Tobacco Use   • Smoking status: Never Smoker   • Smokeless tobacco: Never Used   Vaping Use   • Vaping Use: Never used   Substance and Sexual Activity   • Alcohol use: Yes     Alcohol/week: 1.0 standard drinks     Types: 1 Cans of beer per week     Comment: Only  drink alcoholic beverages occasionally   • Drug use: No   • Sexual activity: Never       No Known Allergies      Current Outpatient Medications:   •  aspirin 81 MG EC tablet, Take 81 mg by mouth Daily., Disp: , Rfl:   •  atorvastatin (LIPITOR) 40 MG tablet, TAKE 1 TABLET BY MOUTH ONCE DAILY, Disp: 90 tablet, Rfl: 3  •  Bystolic 5 MG tablet, TAKE 1 TABLET BY MOUTH ONCE DAILY, Disp: 90 tablet, Rfl: 3  •  Multiple Vitamin (MULTI-VITAMIN DAILY PO), Take 1 tablet by mouth Daily., Disp: , Rfl:   •  valsartan (DIOVAN) 40 MG tablet, TAKE 1 TABLET BY MOUTH DAILY, Disp: 90 tablet, Rfl: 3    Review of Systems   Constitutional: Negative.    Respiratory: Negative.  Negative for chest tightness and shortness of breath.    Cardiovascular: Negative.  Negative for chest pain.   Gastrointestinal: Negative for abdominal pain, blood in stool, constipation and diarrhea.   Musculoskeletal: Positive for arthralgias.        Vital Signs  Vitals:    06/02/21 1507   BP: 126/80   Pulse: 78   Resp: 16   Temp: 98 °F (36.7 °C)   TempSrc: Infrared   SpO2: 99%   Weight: 85 kg (187 lb 6.4 oz)   Height: 180.3 cm (70.98\")       Physical Exam  Vitals reviewed.   Constitutional:       Appearance: He is well-developed.   HENT:      Head: Normocephalic and atraumatic. "   Cardiovascular:      Rate and Rhythm: Normal rate and regular rhythm.      Heart sounds: Normal heart sounds. No murmur heard.     Pulmonary:      Effort: Pulmonary effort is normal.      Breath sounds: Normal breath sounds.   Musculoskeletal:      Comments: Both knees.  Appear normal there is no synovitis or tenderness.   Neurological:      Mental Status: He is alert and oriented to person, place, and time.          Procedures    ACE III MINI             Assessment/Plan:    Diagnoses and all orders for this visit:    1. Acute pain of right knee (Primary)  -     Ambulatory Referral to Physical Therapy Evaluate and treat    Plan    Most likely soft tissue inflammation.  Refer to physical therapy for rehabilitation.      Plan of care reviewed with patient at the conclusion of today's visit. Education was provided regarding diagnosis, management, and any prescribed or recommended OTC medications.Patient verbalizes understanding of and agreement with management plan.         Alberto Denis MD

## 2021-06-15 RX ORDER — ATORVASTATIN CALCIUM 40 MG/1
40 TABLET, FILM COATED ORAL DAILY
Qty: 90 TABLET | Refills: 3 | Status: SHIPPED | OUTPATIENT
Start: 2021-06-15 | End: 2022-06-09 | Stop reason: SDUPTHER

## 2021-07-30 ENCOUNTER — OFFICE VISIT (OUTPATIENT)
Dept: INTERNAL MEDICINE | Facility: CLINIC | Age: 76
End: 2021-07-30

## 2021-07-30 VITALS
BODY MASS INDEX: 26.29 KG/M2 | HEART RATE: 71 BPM | HEIGHT: 71 IN | OXYGEN SATURATION: 96 % | RESPIRATION RATE: 20 BRPM | DIASTOLIC BLOOD PRESSURE: 78 MMHG | SYSTOLIC BLOOD PRESSURE: 134 MMHG | WEIGHT: 187.8 LBS | TEMPERATURE: 97.5 F

## 2021-07-30 DIAGNOSIS — R73.03 PREDIABETES: ICD-10-CM

## 2021-07-30 DIAGNOSIS — Z11.59 ENCOUNTER FOR HEPATITIS C SCREENING TEST FOR LOW RISK PATIENT: ICD-10-CM

## 2021-07-30 DIAGNOSIS — I10 ESSENTIAL HYPERTENSION: ICD-10-CM

## 2021-07-30 DIAGNOSIS — E78.2 MIXED HYPERLIPIDEMIA: ICD-10-CM

## 2021-07-30 DIAGNOSIS — Z00.00 ANNUAL PHYSICAL EXAM: Primary | ICD-10-CM

## 2021-07-30 PROCEDURE — 1170F FXNL STATUS ASSESSED: CPT | Performed by: INTERNAL MEDICINE

## 2021-07-30 PROCEDURE — 99397 PER PM REEVAL EST PAT 65+ YR: CPT | Performed by: INTERNAL MEDICINE

## 2021-07-30 PROCEDURE — G0439 PPPS, SUBSEQ VISIT: HCPCS | Performed by: INTERNAL MEDICINE

## 2021-07-30 PROCEDURE — 1126F AMNT PAIN NOTED NONE PRSNT: CPT | Performed by: INTERNAL MEDICINE

## 2021-07-30 PROCEDURE — 1160F RVW MEDS BY RX/DR IN RCRD: CPT | Performed by: INTERNAL MEDICINE

## 2021-07-30 PROCEDURE — 96160 PT-FOCUSED HLTH RISK ASSMT: CPT | Performed by: INTERNAL MEDICINE

## 2021-07-30 NOTE — PROGRESS NOTES
The ABCs of the Annual Wellness Visit  Subsequent Medicare Wellness Visit    Chief Complaint   Patient presents with   • Medicare Wellness-subsequent   • Hypertension     f/u   • Hyperlipidemia       Subjective   History of Present Illness:  Andrea Rosales Jr. is a 75 y.o. male who presents for a Subsequent Medicare Wellness Visit.    HEALTH RISK ASSESSMENT    Recent Hospitalizations:  No hospitalization(s) within the last year.    Current Medical Providers:  Patient Care Team:  Alberto Denis MD as PCP - General  Alberto Denis MD as PCP - Family Medicine    Smoking Status:  Social History     Tobacco Use   Smoking Status Never Smoker   Smokeless Tobacco Never Used       Alcohol Consumption:  Social History     Substance and Sexual Activity   Alcohol Use Yes   • Alcohol/week: 1.0 standard drinks   • Types: 1 Cans of beer per week    Comment: Only  drink alcoholic beverages occasionally       Depression Screen:   PHQ-2/PHQ-9 Depression Screening 7/30/2021   Little interest or pleasure in doing things 0   Feeling down, depressed, or hopeless 0   Total Score 0       Fall Risk Screen:  RADHA Fall Risk Assessment was completed, and patient is at MODERATE risk for falls. Assessment completed on:7/30/2021    Health Habits and Functional and Cognitive Screening:  Functional & Cognitive Status 7/30/2021   Do you have difficulty preparing food and eating? No   Do you have difficulty bathing yourself, getting dressed or grooming yourself? No   Do you have difficulty using the toilet? No   Do you have difficulty moving around from place to place? No   Do you have trouble with steps or getting out of a bed or a chair? No   Current Diet Well Balanced Diet   Dental Exam Up to date   Eye Exam Not up to date   Exercise (times per week) 6 times per week   Current Exercises Include Tennis;Walking   Current Exercise Activities Include -   Do you need help using the phone?  No   Are you deaf or do you have serious  difficulty hearing?  Yes   Do you need help with transportation? No   Do you need help shopping? No   Do you need help preparing meals?  No   Do you need help with housework?  No   Do you need help with laundry? No   Do you need help taking your medications? No   Do you need help managing money? No   Do you ever drive or ride in a car without wearing a seat belt? No   Have you felt unusual stress, anger or loneliness in the last month? No   Who do you live with? Spouse   If you need help, do you have trouble finding someone available to you? No   Have you been bothered in the last four weeks by sexual problems? No   Do you have difficulty concentrating, remembering or making decisions? No         Does the patient have evidence of cognitive impairment? No    Asprin use counseling:Taking ASA appropriately as indicated    Age-appropriate Screening Schedule:  Refer to the list below for future screening recommendations based on patient's age, sex and/or medical conditions. Orders for these recommended tests are listed in the plan section. The patient has been provided with a written plan.    Health Maintenance   Topic Date Due   • TDAP/TD VACCINES (1 - Tdap) Never done   • ZOSTER VACCINE (1 of 2) Never done   • INFLUENZA VACCINE  10/01/2021   • LIPID PANEL  01/28/2022          The following portions of the patient's history were reviewed and updated as appropriate: allergies, current medications, past family history, past medical history, past social history, past surgical history and problem list.    Outpatient Medications Prior to Visit   Medication Sig Dispense Refill   • aspirin 81 MG EC tablet Take 81 mg by mouth Daily.     • atorvastatin (LIPITOR) 40 MG tablet Take 1 tablet by mouth Daily. 90 tablet 3   • Bystolic 5 MG tablet TAKE 1 TABLET BY MOUTH ONCE DAILY 90 tablet 3   • Multiple Vitamin (MULTI-VITAMIN DAILY PO) Take 1 tablet by mouth Daily.     • valsartan (DIOVAN) 40 MG tablet TAKE 1 TABLET BY MOUTH DAILY 90  "tablet 3     No facility-administered medications prior to visit.       Patient Active Problem List   Diagnosis   • Hypertension   • Hyperlipidemia   • Osteoarthritis   • Palpitations   • Prediabetes   • Hypercholesterolemia   • Benign essential hypertension   • History of prostate cancer   • Annual physical exam   • Premature atrial contraction   • Localized, secondary osteoarthritis of the shoulder region, right       Advanced Care Planning:  ACP discussion was held with the patient during this visit. Patient has an advance directive (not in EMR), copy requested.    Review of Systems   Constitutional: Negative for chills, fatigue and fever.   HENT: Negative for congestion, ear pain and sinus pressure.    Respiratory: Negative for cough, chest tightness, shortness of breath and wheezing.    Cardiovascular: Negative for chest pain and palpitations.   Gastrointestinal: Negative for abdominal pain, blood in stool and constipation.   Skin: Negative for color change.   Allergic/Immunologic: Negative for environmental allergies.   Neurological: Negative for dizziness, speech difficulty and headaches.   Psychiatric/Behavioral: Negative for confusion. The patient is not nervous/anxious.        Compared to one year ago, the patient feels his physical health is the same.  Compared to one year ago, the patient feels his mental health is the same.    Reviewed chart for potential of high risk medication in the elderly: yes  Reviewed chart for potential of harmful drug interactions in the elderly:yes    Objective         Vitals:    07/30/21 1116   BP: 134/78   BP Location: Left arm   Patient Position: Sitting   Cuff Size: Adult   Pulse: 71   Resp: 20   Temp: 97.5 °F (36.4 °C)   TempSrc: Infrared   SpO2: 96%   Weight: 85.2 kg (187 lb 12.8 oz)   Height: 180.3 cm (70.98\")   PainSc: 0-No pain       Body mass index is 26.21 kg/m².  Discussed the patient's BMI with him. The BMI is above average; BMI management plan is " completed.    Physical Exam  Vitals and nursing note reviewed.   Constitutional:       Appearance: He is well-developed.   HENT:      Head: Normocephalic and atraumatic.      Right Ear: External ear normal.      Left Ear: External ear normal.      Nose: Nose normal.      Mouth/Throat:      Pharynx: No oropharyngeal exudate.   Eyes:      Conjunctiva/sclera: Conjunctivae normal.      Pupils: Pupils are equal, round, and reactive to light.   Neck:      Thyroid: No thyromegaly.      Vascular: No JVD.   Cardiovascular:      Rate and Rhythm: Normal rate and regular rhythm.      Heart sounds: Normal heart sounds. No murmur heard.   No friction rub. No gallop.    Pulmonary:      Effort: Pulmonary effort is normal. No respiratory distress.      Breath sounds: Normal breath sounds. No wheezing or rales.   Chest:      Chest wall: No tenderness.   Abdominal:      General: Bowel sounds are normal. There is no distension.      Palpations: Abdomen is soft. There is no mass.      Tenderness: There is no abdominal tenderness. There is no guarding or rebound.      Hernia: No hernia is present.   Musculoskeletal:         General: No tenderness. Normal range of motion.      Cervical back: Normal range of motion and neck supple.   Lymphadenopathy:      Cervical: No cervical adenopathy.   Skin:     General: Skin is warm and dry.      Findings: No erythema or rash.      Comments: Scattered lentigines.  There is a sebaceous cyst right lateral thigh.   Neurological:      Mental Status: He is alert and oriented to person, place, and time.      Cranial Nerves: No cranial nerve deficit.      Sensory: No sensory deficit.      Motor: No abnormal muscle tone.      Coordination: Coordination normal.      Deep Tendon Reflexes: Reflexes normal.   Psychiatric:         Behavior: Behavior normal.         Thought Content: Thought content normal.         Judgment: Judgment normal.               Assessment/Plan   Medicare Risks and Personalized Health  Plan  CMS Preventative Services Quick Reference  Advance Directive Discussion    The above risks/problems have been discussed with the patient.  Pertinent information has been shared with the patient in the After Visit Summary.  Follow up plans and orders are seen below in the Assessment/Plan Section.    Diagnoses and all orders for this visit:    1. Annual physical exam (Primary)    2. Essential hypertension  -     CBC & Differential; Future  -     Microalbumin / Creatinine Urine Ratio - Urine, Clean Catch; Future    3. Mixed hyperlipidemia  -     Comprehensive Metabolic Panel; Future  -     Lipid Panel; Future    4. Prediabetes  -     Hemoglobin A1c; Future    5. Encounter for hepatitis C screening test for low risk patient  -     Hepatitis C antibody; Future      Plan    Overall he remains in excellent health with good lifestyle habits.  He is up-to-date on colorectal cancer screening.  He is fully vaccinated against COVID-19.  I recommended that he obtain Shingrix.    Blood pressure is well controlled on valsartan and Bystolic.    Lipid panel is pending, he will continue atorvastatin and healthy diet.    A1c is pending, the treatment remains healthy diet and avoidance of weight gain.    Follow Up:  Return in about 6 months (around 1/30/2022) for follow up.     An After Visit Summary and PPPS were given to the patient.

## 2021-07-30 NOTE — PROGRESS NOTES
[facility]     Andrea Rosales Jr.  1945   [id number]      Patient Care Team:  Alberto Denis MD as PCP - General  Alberto Denis MD as PCP - Family Medicine    Chief Complaint::   Chief Complaint   Patient presents with   • Medicare Wellness-subsequent   • Hypertension     f/u   • Hyperlipidemia        HPI  ***    Chronic Conditions:  ***    Patient Active Problem List   Diagnosis   • Hypertension   • Hyperlipidemia   • Osteoarthritis   • Palpitations   • Prediabetes   • Hypercholesterolemia   • Benign essential hypertension   • History of prostate cancer   • Annual physical exam   • Premature atrial contraction   • Localized, secondary osteoarthritis of the shoulder region, right        Past Medical History:   Diagnosis Date   • Arthritis Shoulder, 2019   • Benign essential hypertension    • Cataract Forming, 2018/19   • Diverticulosis Diverticulosis, 9/2019   • GERD (gastroesophageal reflux disease) Appeared to have GERD about 3 years ago   • History of colonic polyps    • History of prostate cancer    • HL (hearing loss) For many years, hearing aid 10,2019, no difference   • Hypercholesterolemia    • Prediabetes    • Prostate cancer (CMS/[facility])     S/P PROSTATECTOMY   • Right-sided back pain    • Visual impairment Driving at night, outside city       Past Surgical History:   Procedure Laterality Date   • COLONOSCOPY  09/2019   • PROSTATECTOMY  1998    Prostate cancer-Radical    • TONSILLECTOMY         Family History   Problem Relation Age of Onset   • Breast cancer Mother    • Cancer Mother         Breast cancer, then spread   • Stroke Father    • Stomach cancer Father         Gastric   • Alcohol abuse Father         But not out of hand   • Cancer Father         Bladder   • Breast cancer Sister    • Cancer Sister         Breast cancer, then spread       Social History     Socioeconomic History   • Marital status:      Spouse name: Not on file   • Number of children:  "Not on file   • Years of education: Not on file   • Highest education level: Not on file   Tobacco Use   • Smoking status: Never Smoker   • Smokeless tobacco: Never Used   Vaping Use   • Vaping Use: Never used   Substance and Sexual Activity   • Alcohol use: Yes     Alcohol/week: 1.0 standard drinks     Types: 1 Cans of beer per week     Comment: Only  drink alcoholic beverages occasionally   • Drug use: No   • Sexual activity: Never       No Known Allergies      Current Outpatient Medications:   •  aspirin 81 MG EC tablet, Take 81 mg by mouth Daily., Disp: , Rfl:   •  atorvastatin (LIPITOR) 40 MG tablet, Take 1 tablet by mouth Daily., Disp: 90 tablet, Rfl: 3  •  Bystolic 5 MG tablet, TAKE 1 TABLET BY MOUTH ONCE DAILY, Disp: 90 tablet, Rfl: 3  •  Multiple Vitamin (MULTI-VITAMIN DAILY PO), Take 1 tablet by mouth Daily., Disp: , Rfl:   •  valsartan (DIOVAN) 40 MG tablet, TAKE 1 TABLET BY MOUTH DAILY, Disp: 90 tablet, Rfl: 3    Immunization History   Administered Date(s) Administered   • COVID-19 (PFIZER) 01/29/2021, 02/26/2021   • Fluzone High Dose =>65 Years ([employer] ONLY) 12/31/2018, 10/16/2019, 08/19/2020   • Pneumococcal Conjugate 13-Valent (PCV13) 11/20/2020        Health Maintenance Due   Topic Date Due   • TDAP/TD VACCINES (1 - Tdap) Never done   • ZOSTER VACCINE (1 of 2) Never done   • HEPATITIS C SCREENING  Never done   • ANNUAL WELLNESS VISIT  07/24/2021        Review of Systems     Vital Signs  Vitals:    07/30/21 1116   BP: 134/78   BP Location: Left arm   Patient Position: Sitting   Cuff Size: Adult   Pulse: 71   Resp: 20   Temp: 97.5 °F (36.4 °C)   TempSrc: Infrared   SpO2: 96%   Weight: 85.2 kg (187 lb 12.8 oz)   Height: 180.3 cm (70.98\")   PainSc: 0-No pain       Physical Exam     Procedures     Fall Risk Screen:  ROSETTAADI Fall Risk Assessment was completed, and patient is at MODERATE risk for falls. Assessment completed on:7/30/2021    Health Habits and Functional and Cognitive Screening:  Functional & " Cognitive Status 7/30/2021   Do you have difficulty preparing food and eating? No   Do you have difficulty bathing yourself, getting dressed or grooming yourself? No   Do you have difficulty using the toilet? No   Do you have difficulty moving around from place to place? No   Do you have trouble with steps or getting out of a bed or a chair? No   Current Diet Well Balanced Diet   Dental Exam Up to date   Eye Exam Not up to date   Exercise (times per week) 6 times per week   Current Exercises Include Tennis;Walking   Current Exercise Activities Include -   Do you need help using the phone?  No   Are you deaf or do you have serious difficulty hearing?  Yes   Do you need help with transportation? No   Do you need help shopping? No   Do you need help preparing meals?  No   Do you need help with housework?  No   Do you need help with laundry? No   Do you need help taking your medications? No   Do you need help managing money? No   Do you ever drive or ride in a car without wearing a seat belt? No   Have you felt unusual stress, anger or loneliness in the last month? No   Who do you live with? Spouse   If you need help, do you have trouble finding someone available to you? No   Have you been bothered in the last four weeks by sexual problems? No   Do you have difficulty concentrating, remembering or making decisions? No       Depression [place]  PHQ-9 Total Score: 0     ACE III MINI             Assessment/Plan:    There are no diagnoses linked to this encounter.        Labs  Results for orders placed or performed in visit on 04/09/21   POC Urinalysis Dipstick, Automated    Specimen: Urine   Result Value Ref Range    Color Yellow Yellow, Straw, Dark Yellow, Maribell    Clarity, UA Clear Clear    Specific Gravity  1.030 1.005 - 1.030    pH, Urine 5.5 5.0 - 8.0    Leukocytes Negative Negative    Nitrite, UA Negative Negative    Protein, POC Negative Negative mg/dL    Glucose, UA Negative Negative, 1000 mg/dL (3+) mg/dL     Ketones, UA Negative Negative    Urobilinogen, UA Normal Normal    Bilirubin Negative Negative    Blood, UA Negative Negative        Counseling was given to {person:[id number]} for the following topics: {[facility]ing Topics:[id number]}.    Plan of care reviewed with patient at the conclusion of today's visit. Education was provided regarding diagnosis, management, and any prescribed or recommended OTC medications.Patient verbalizes understanding of and agreement with management plan.         Shavon Naranjo MA

## 2021-07-31 LAB
ALBUMIN SERPL-MCNC: 4.9 G/DL (ref 3.7–4.7)
ALBUMIN/CREAT UR: 4 MG/G CREAT (ref 0–29)
ALBUMIN/GLOB SERPL: 1.9 {RATIO} (ref 1.2–2.2)
ALP SERPL-CCNC: 88 IU/L (ref 48–121)
ALT SERPL-CCNC: 35 IU/L (ref 0–44)
AST SERPL-CCNC: 33 IU/L (ref 0–40)
BASOPHILS # BLD AUTO: 0.1 X10E3/UL (ref 0–0.2)
BASOPHILS NFR BLD AUTO: 1 %
BILIRUB SERPL-MCNC: 0.7 MG/DL (ref 0–1.2)
BUN SERPL-MCNC: 18 MG/DL (ref 8–27)
BUN/CREAT SERPL: 18 (ref 10–24)
CALCIUM SERPL-MCNC: 9.6 MG/DL (ref 8.6–10.2)
CHLORIDE SERPL-SCNC: 106 MMOL/L (ref 96–106)
CHOLEST SERPL-MCNC: 102 MG/DL (ref 100–199)
CO2 SERPL-SCNC: 21 MMOL/L (ref 20–29)
CREAT SERPL-MCNC: 1.02 MG/DL (ref 0.76–1.27)
CREAT UR-MCNC: 243.7 MG/DL
EOSINOPHIL # BLD AUTO: 0.7 X10E3/UL (ref 0–0.4)
EOSINOPHIL NFR BLD AUTO: 8 %
ERYTHROCYTE [DISTWIDTH] IN BLOOD BY AUTOMATED COUNT: 13.1 % (ref 11.6–15.4)
GLOBULIN SER CALC-MCNC: 2.6 G/DL (ref 1.5–4.5)
GLUCOSE SERPL-MCNC: 105 MG/DL (ref 65–99)
HBA1C MFR BLD: 5.5 % (ref 4.8–5.6)
HCT VFR BLD AUTO: 49.5 % (ref 37.5–51)
HCV AB S/CO SERPL IA: <0.1 S/CO RATIO (ref 0–0.9)
HDLC SERPL-MCNC: 41 MG/DL
HGB BLD-MCNC: 16.7 G/DL (ref 13–17.7)
IMM GRANULOCYTES # BLD AUTO: 0 X10E3/UL (ref 0–0.1)
IMM GRANULOCYTES NFR BLD AUTO: 0 %
LDLC SERPL CALC-MCNC: 46 MG/DL (ref 0–99)
LYMPHOCYTES # BLD AUTO: 2.4 X10E3/UL (ref 0.7–3.1)
LYMPHOCYTES NFR BLD AUTO: 27 %
MCH RBC QN AUTO: 31.6 PG (ref 26.6–33)
MCHC RBC AUTO-ENTMCNC: 33.7 G/DL (ref 31.5–35.7)
MCV RBC AUTO: 94 FL (ref 79–97)
MICROALBUMIN UR-MCNC: 10.3 UG/ML
MONOCYTES # BLD AUTO: 0.8 X10E3/UL (ref 0.1–0.9)
MONOCYTES NFR BLD AUTO: 9 %
NEUTROPHILS # BLD AUTO: 5.2 X10E3/UL (ref 1.4–7)
NEUTROPHILS NFR BLD AUTO: 55 %
PLATELET # BLD AUTO: 151 X10E3/UL (ref 150–450)
POTASSIUM SERPL-SCNC: 4.3 MMOL/L (ref 3.5–5.2)
PROT SERPL-MCNC: 7.5 G/DL (ref 6–8.5)
RBC # BLD AUTO: 5.29 X10E6/UL (ref 4.14–5.8)
SODIUM SERPL-SCNC: 142 MMOL/L (ref 134–144)
TRIGL SERPL-MCNC: 71 MG/DL (ref 0–149)
VLDLC SERPL CALC-MCNC: 15 MG/DL (ref 5–40)
WBC # BLD AUTO: 9.1 X10E3/UL (ref 3.4–10.8)

## 2021-09-10 RX ORDER — NEBIVOLOL HYDROCHLORIDE 5 MG/1
TABLET ORAL
Qty: 90 TABLET | Refills: 3 | Status: SHIPPED | OUTPATIENT
Start: 2021-09-10 | End: 2022-09-06

## 2021-12-29 ENCOUNTER — OFFICE VISIT (OUTPATIENT)
Dept: INTERNAL MEDICINE | Facility: CLINIC | Age: 76
End: 2021-12-29

## 2021-12-29 VITALS
WEIGHT: 189 LBS | HEART RATE: 90 BPM | OXYGEN SATURATION: 93 % | TEMPERATURE: 101.9 F | DIASTOLIC BLOOD PRESSURE: 60 MMHG | BODY MASS INDEX: 26.46 KG/M2 | HEIGHT: 71 IN | SYSTOLIC BLOOD PRESSURE: 120 MMHG

## 2021-12-29 DIAGNOSIS — J06.9 UPPER RESPIRATORY TRACT INFECTION, UNSPECIFIED TYPE: ICD-10-CM

## 2021-12-29 DIAGNOSIS — R05.9 COUGH: Primary | ICD-10-CM

## 2021-12-29 DIAGNOSIS — R50.9 FEVER, UNSPECIFIED FEVER CAUSE: ICD-10-CM

## 2021-12-29 LAB
EXPIRATION DATE: NORMAL
FLUAV RNA RESP QL NAA+PROBE: NEGATIVE
FLUBV RNA RESP QL NAA+PROBE: NEGATIVE
INTERNAL CONTROL: NORMAL
Lab: NORMAL

## 2021-12-29 PROCEDURE — 99213 OFFICE O/P EST LOW 20 MIN: CPT | Performed by: NURSE PRACTITIONER

## 2021-12-29 PROCEDURE — 87502 INFLUENZA DNA AMP PROBE: CPT | Performed by: NURSE PRACTITIONER

## 2021-12-29 PROCEDURE — U0004 COV-19 TEST NON-CDC HGH THRU: HCPCS | Performed by: NURSE PRACTITIONER

## 2021-12-29 RX ORDER — AMOXICILLIN AND CLAVULANATE POTASSIUM 875; 125 MG/1; MG/1
1 TABLET, FILM COATED ORAL 2 TIMES DAILY
Qty: 20 TABLET | Refills: 0 | Status: SHIPPED | OUTPATIENT
Start: 2021-12-29 | End: 2022-01-08

## 2021-12-29 RX ORDER — PNEUMOCOCCAL VACCINE POLYVALENT 25; 25; 25; 25; 25; 25; 25; 25; 25; 25; 25; 25; 25; 25; 25; 25; 25; 25; 25; 25; 25; 25; 25 UG/.5ML; UG/.5ML; UG/.5ML; UG/.5ML; UG/.5ML; UG/.5ML; UG/.5ML; UG/.5ML; UG/.5ML; UG/.5ML; UG/.5ML; UG/.5ML; UG/.5ML; UG/.5ML; UG/.5ML; UG/.5ML; UG/.5ML; UG/.5ML; UG/.5ML; UG/.5ML; UG/.5ML; UG/.5ML; UG/.5ML
INJECTION, SOLUTION INTRAMUSCULAR; SUBCUTANEOUS
COMMUNITY
Start: 2021-11-24 | End: 2022-08-09

## 2021-12-29 RX ORDER — BENZONATATE 100 MG/1
100 CAPSULE ORAL 3 TIMES DAILY PRN
Qty: 30 CAPSULE | Refills: 0 | Status: SHIPPED | OUTPATIENT
Start: 2021-12-29 | End: 2022-02-01

## 2021-12-29 RX ORDER — ATORVASTATIN CALCIUM 40 MG/1
TABLET, FILM COATED ORAL
COMMUNITY
Start: 2021-12-09 | End: 2021-12-29 | Stop reason: SDUPTHER

## 2021-12-29 RX ORDER — A/SINGAPORE/GP1908/2015 IVR-180 (AN A/MICHIGAN/45/2015 (H1N1)PDM09-LIKE VIRUS, A/HONG KONG/4801/2014, NYMC X-263B (H3N2) (AN A/HONG KONG/4801/2014-LIKE VIRUS), AND B/BRISBANE/60/2008, WILD TYPE (A B/BRISBANE/60/2008-LIKE VIRUS) 15; 15; 15 UG/.5ML; UG/.5ML; UG/.5ML
INJECTION, SUSPENSION INTRAMUSCULAR
COMMUNITY
Start: 2021-11-12 | End: 2022-03-18

## 2021-12-29 NOTE — PROGRESS NOTES
Follow Up Office Visit      Patient Name: Andrea Rosales Jr.  : 1945   MRN: [id number]   Care Team: Patient Care Team:  Alberto Denis MD as PCP - General  Alberto Denis MD as PCP - Family Medicine    Chief Complaint:    Chief Complaint   Patient presents with   • Cough       History of Present Illness: Andrea Rosales Jr. is a 76 y.o. male who is here today for acute new issue of cough, head congestion and fever.      Symptoms started abruptly yesterday upon awaking.  Had cough which progressed throughout day to sinus congestion, body aches and fever.  Awoke feeling a bit worse today.     Have not taken any medications to treat symptoms.      Noted fully vaccinated with booster for covid.  No known exposure.  No known exposure to flu.    Denies any loss of taste/smell.  Low appetite though.      Subjective      Review of Systems:   Review of Systems   Constitutional: Positive for appetite change, chills, fatigue and fever.   HENT: Positive for postnasal drip and rhinorrhea.    Respiratory: Positive for cough. Negative for shortness of breath and wheezing.    Gastrointestinal: Negative for abdominal pain and nausea.   Neurological: Positive for headache.       I have reviewed and the following portions of the patient's history were updated as appropriate: past family history, past medical history, past social history, past surgical history and problem list.    Medications:     Current Outpatient Medications:   •  aspirin 81 MG EC tablet, Take 81 mg by mouth Daily., Disp: , Rfl:   •  atorvastatin (LIPITOR) 40 MG tablet, Take 1 tablet by mouth Daily., Disp: 90 tablet, Rfl: 3  •  Bystolic 5 MG tablet, TAKE 1 TABLET BY MOUTH EVERY DAY, Disp: 90 tablet, Rfl: 3  •  Fluad Quadrivalent 0.5 ML prefilled syringe injection, , Disp: , Rfl:   •  Multiple Vitamin (MULTI-VITAMIN DAILY PO), Take 1 tablet by mouth Daily., Disp: , Rfl:   •  Pneumovax 23 25 MCG/0.5ML vaccine, , Disp: , Rfl:   •   "valsartan (DIOVAN) 40 MG tablet, TAKE 1 TABLET BY MOUTH DAILY, Disp: 90 tablet, Rfl: 3  •  amoxicillin-clavulanate (Augmentin) 875-125 MG per tablet, Take 1 tablet by mouth 2 (Two) Times a Day for 10 days., Disp: 20 tablet, Rfl: 0  •  atorvastatin (LIPITOR) 40 MG tablet, , Disp: , Rfl:   •  benzonatate (Tessalon Perles) 100 MG capsule, Take 1 capsule by mouth 3 (Three) Times a Day As Needed for Cough., Disp: 30 capsule, Rfl: 0    Allergies:   No Known Allergies    Objective     Physical Exam:  Vital Signs:   Vitals:    12/29/21 1437   BP: 120/60   BP Location: Left arm   Patient Position: Sitting   Cuff Size: Adult   Pulse: 90   Temp: (!) 101.9 °F (38.8 °C)   TempSrc: Temporal   SpO2: 93%   Weight: 85.7 kg (189 lb)   Height: 180.3 cm (70.98\")   PainSc:   4     Body mass index is 26.37 kg/m².     Physical Exam  Vitals and nursing note reviewed.   Constitutional:       General: He is not in acute distress.  HENT:      Head: Normocephalic and atraumatic.      Comments: Patient wearing facial mask.     Right Ear: Tympanic membrane, ear canal and external ear normal.      Left Ear: Tympanic membrane, ear canal and external ear normal.   Cardiovascular:      Rate and Rhythm: Normal rate and regular rhythm.   Pulmonary:      Effort: Pulmonary effort is normal. No respiratory distress.   Skin:     General: Skin is warm and dry.   Neurological:      Mental Status: He is alert.   Psychiatric:         Mood and Affect: Mood normal.         Thought Content: Thought content normal.         Judgment: Judgment normal.         Assessment / Plan      Assessment/Plan:   Problems Addressed This Visit    ICD-10-CM ICD-9-CM   1. Cough  R05.9 786.2   2. Fever, unspecified fever cause  R50.9 780.60   3. Upper respiratory tract infection, unspecified type  J06.9 465.9      Cough  Fever  URI  - FLU negative  -Covid test pending  -empirically treating with augmentin 875/125mg twice daily x 10 days  - Benzonatate 100mg TID PRN  - OTC Tylenol " to control fever      Plan of care reviewed with patient at the conclusion of today's visit. Education was provided regarding diagnosis and management.  Patient verbalizes understanding of and agreement with management plan.    Patient Instructions   Fever, Adult         A fever is an increase in the body's temperature. It is usually defined as a temperature of 100.4°F (38°C) or higher. Brief mild or moderate fevers generally have no long-term effects, and they often do not need treatment. Moderate or high fevers may make you feel uncomfortable and can sometimes be a sign of a serious illness or disease. The sweating that may occur with repeated or prolonged fever may also cause a loss of fluid in the body (dehydration).  Fever is confirmed by taking a temperature with a thermometer. A measured temperature can vary with:  · Age.  · Time of day.  · Where in the body you take the temperature. Readings may vary if you place the thermometer:  ? In the mouth (oral).  ? In the rectum (rectal).  ? In the ear (tympanic).  ? Under the arm (axillary).  ? On the forehead (temporal).  Follow these instructions at home:  Medicines  · Take over-the counter and prescription medicines only as told by your health care provider. Follow the dosing instructions carefully.  · If you were prescribed an antibiotic medicine, take it as told by your health care provider. Do not stop taking the antibiotic even if you start to feel better.  General instructions  · Watch your condition for any changes. Let your health care provider know about them.  · Rest as needed.  · Drink enough fluid to keep your urine pale yellow. This helps to prevent dehydration.  · Sponge yourself or bathe with room-temperature water to help reduce your body temperature as needed. Do not use ice water.  · Do not use too many blankets or wear clothes that are too heavy.  · If your fever may be caused by an infection that spreads from person to person (is contagious),  such as a cold or the flu, you should stay home from work and public gatherings for at least 24 hours after your fever is gone. Your fever should be gone without the need to use medicines.  Contact a health care provider if:  · You vomit.  · You cannot eat or drink without vomiting.  · You have diarrhea.  · You have pain when you urinate.  · Your symptoms do not improve with treatment.  · You develop new symptoms.  · You develop excessive weakness.  Get help right away if:  · You have shortness of breath or have trouble breathing.  · You are dizzy or you faint.  · You are disoriented or confused.  · You develop signs of dehydration, such as:  ? Dark urine, very little urine, or no urine.  ? Cracked lips.  ? Dry mouth.  ? Sunken eyes.  ? Sleepiness.  ? Weakness.  · You develop severe pain in your abdomen.  · You have persistent vomiting or diarrhea.  · You develop a skin rash.  · Your symptoms suddenly get worse.  Summary  · A fever is an increase in the body's temperature. It is usually defined as a temperature of 100.4°F (38°C) or higher. Moderate or high fevers can sometimes be a sign of a serious illness or disease. The sweating that may occur with repeated or prolonged fever may also cause dehydration.  · Pay attention to any changes in your symptoms and contact your health care provider if your symptoms do not improve with treatment.  · Take over-the counter and prescription medicines only as told by your health care provider. Follow the dosing instructions carefully.  · If your fever is from an infection that may be contagious, such as cold or flu, you should stay home from work and public gatherings for at least 24 hours after your fever is gone. Your fever should be gone without the need to use medicines.  · Get help right away if you develop signs of dehydration, such as dark urine, cracked lips, dry mouth, sunken eyes, sleepiness, or weakness.  This information is not intended to replace advice given to  you by your health care provider. Make sure you discuss any questions you have with your health care provider.  Document Revised: 06/03/2019 Document Reviewed: 06/03/2019  Elsevier Patient Education © 2021 [employer]  Cough, Adult  Coughing is a reflex that clears your throat and your airways (respiratory system). Coughing helps to heal and protect your lungs. It is normal to cough occasionally, but a cough that happens with other symptoms or lasts a long time may be a sign of a condition that needs treatment. An acute cough may only last 2-3 weeks, while a chronic cough may last 8 or more weeks.  Coughing is commonly caused by:  · Infection of the respiratory systemby viruses or bacteria.  · Breathing in substances that irritate your lungs.  · Allergies.  · Asthma.  · Mucus that runs down the back of your throat (postnasal drip).  · Smoking.  · Acid backing up from the stomach into the esophagus (gastroesophageal reflux).  · Certain medicines.  · Chronic lung problems.  · Other medical conditions such as heart failure or a blood clot in the lung (pulmonary embolism).  Follow these instructions at home:  Medicines  · Take over-the-counter and prescription medicines only as told by your health care provider.  · Talk with your health care provider before you take a cough suppressant medicine.  Lifestyle    · Avoid cigarette smoke. Do not use any products that contain nicotine or tobacco, such as cigarettes, e-cigarettes, and chewing tobacco. If you need help quitting, ask your health care provider.  · Drink enough fluid to keep your urine pale yellow.  · Avoid caffeine.  · Do not drink alcohol if your health care provider tells you not to drink.    General instructions    · Pay close attention to changes in your cough. Tell your health care provider about them.  · Always cover your mouth when you cough.  · Avoid things that make you cough, such as perfume, candles, cleaning products, or campfire or tobacco  smoke.  · If the air is dry, use a cool mist vaporizer or humidifier in your bedroom or your home to help loosen secretions.  · If your cough is worse at night, try to sleep in a semi-upright position.  · Rest as needed.  · Keep all follow-up visits as told by your health care provider. This is important.    Contact a health care provider if you:  · Have new symptoms.  · Cough up pus.  · Have a cough that does not get better after 2-3 weeks or gets worse.  · Cannot control your cough with cough suppressant medicines and you are losing sleep.  · Have pain that gets worse or pain that is not helped with medicine.  · Have a fever.  · Have unexplained weight loss.  · Have night sweats.  Get help right away if:  · You cough up blood.  · You have difficulty breathing.  · Your heartbeat is very fast.  These symptoms may represent a serious problem that is an emergency. Do not wait to see if the symptoms will go away. Get medical help right away. Call your local emergency services (911 in the [place] Do not drive yourself to the hospital.  Summary  · Coughing is a reflex that clears your throat and your airways. It is normal to cough occasionally, but a cough that happens with other symptoms or lasts a long time may be a sign of a condition that needs treatment.  · Take over-the-counter and prescription medicines only as told by your health care provider.  · Always cover your mouth when you cough.  · Contact a health care provider if you have new symptoms or a cough that does not get better after 2-3 weeks or gets worse.  This information is not intended to replace advice given to you by your health care provider. Make sure you discuss any questions you have with your health care provider.  Document Revised: 01/06/2020 Document Reviewed: 01/06/2020  [employer] Patient Education © 2021 [employer]        Follow Up:   Return if symptoms worsen or fail to improve, for Next scheduled follow up.        Erica Morrow  KIANA  [facility] [address] [facility]    Please note that portions of this note were completed with a voice recognition program.

## 2021-12-29 NOTE — PATIENT INSTRUCTIONS
Fever, Adult         A fever is an increase in the body's temperature. It is usually defined as a temperature of 100.4°F (38°C) or higher. Brief mild or moderate fevers generally have no long-term effects, and they often do not need treatment. Moderate or high fevers may make you feel uncomfortable and can sometimes be a sign of a serious illness or disease. The sweating that may occur with repeated or prolonged fever may also cause a loss of fluid in the body (dehydration).  Fever is confirmed by taking a temperature with a thermometer. A measured temperature can vary with:  · Age.  · Time of day.  · Where in the body you take the temperature. Readings may vary if you place the thermometer:  ? In the mouth (oral).  ? In the rectum (rectal).  ? In the ear (tympanic).  ? Under the arm (axillary).  ? On the forehead (temporal).  Follow these instructions at home:  Medicines  · Take over-the counter and prescription medicines only as told by your health care provider. Follow the dosing instructions carefully.  · If you were prescribed an antibiotic medicine, take it as told by your health care provider. Do not stop taking the antibiotic even if you start to feel better.  General instructions  · Watch your condition for any changes. Let your health care provider know about them.  · Rest as needed.  · Drink enough fluid to keep your urine pale yellow. This helps to prevent dehydration.  · Sponge yourself or bathe with room-temperature water to help reduce your body temperature as needed. Do not use ice water.  · Do not use too many blankets or wear clothes that are too heavy.  · If your fever may be caused by an infection that spreads from person to person (is contagious), such as a cold or the flu, you should stay home from work and public gatherings for at least 24 hours after your fever is gone. Your fever should be gone without the need to use medicines.  Contact a health care provider if:  · You vomit.  · You cannot  eat or drink without vomiting.  · You have diarrhea.  · You have pain when you urinate.  · Your symptoms do not improve with treatment.  · You develop new symptoms.  · You develop excessive weakness.  Get help right away if:  · You have shortness of breath or have trouble breathing.  · You are dizzy or you faint.  · You are disoriented or confused.  · You develop signs of dehydration, such as:  ? Dark urine, very little urine, or no urine.  ? Cracked lips.  ? Dry mouth.  ? Sunken eyes.  ? Sleepiness.  ? Weakness.  · You develop severe pain in your abdomen.  · You have persistent vomiting or diarrhea.  · You develop a skin rash.  · Your symptoms suddenly get worse.  Summary  · A fever is an increase in the body's temperature. It is usually defined as a temperature of 100.4°F (38°C) or higher. Moderate or high fevers can sometimes be a sign of a serious illness or disease. The sweating that may occur with repeated or prolonged fever may also cause dehydration.  · Pay attention to any changes in your symptoms and contact your health care provider if your symptoms do not improve with treatment.  · Take over-the counter and prescription medicines only as told by your health care provider. Follow the dosing instructions carefully.  · If your fever is from an infection that may be contagious, such as cold or flu, you should stay home from work and public gatherings for at least 24 hours after your fever is gone. Your fever should be gone without the need to use medicines.  · Get help right away if you develop signs of dehydration, such as dark urine, cracked lips, dry mouth, sunken eyes, sleepiness, or weakness.  This information is not intended to replace advice given to you by your health care provider. Make sure you discuss any questions you have with your health care provider.  Document Revised: 06/03/2019 Document Reviewed: 06/03/2019  [employer] Patient Education © 2021 [employer].  Cough, Adult  Coughing is a reflex  that clears your throat and your airways (respiratory system). Coughing helps to heal and protect your lungs. It is normal to cough occasionally, but a cough that happens with other symptoms or lasts a long time may be a sign of a condition that needs treatment. An acute cough may only last 2-3 weeks, while a chronic cough may last 8 or more weeks.  Coughing is commonly caused by:  · Infection of the respiratory systemby viruses or bacteria.  · Breathing in substances that irritate your lungs.  · Allergies.  · Asthma.  · Mucus that runs down the back of your throat (postnasal drip).  · Smoking.  · Acid backing up from the stomach into the esophagus (gastroesophageal reflux).  · Certain medicines.  · Chronic lung problems.  · Other medical conditions such as heart failure or a blood clot in the lung (pulmonary embolism).  Follow these instructions at home:  Medicines  · Take over-the-counter and prescription medicines only as told by your health care provider.  · Talk with your health care provider before you take a cough suppressant medicine.  Lifestyle    · Avoid cigarette smoke. Do not use any products that contain nicotine or tobacco, such as cigarettes, e-cigarettes, and chewing tobacco. If you need help quitting, ask your health care provider.  · Drink enough fluid to keep your urine pale yellow.  · Avoid caffeine.  · Do not drink alcohol if your health care provider tells you not to drink.    General instructions    · Pay close attention to changes in your cough. Tell your health care provider about them.  · Always cover your mouth when you cough.  · Avoid things that make you cough, such as perfume, candles, cleaning products, or campfire or tobacco smoke.  · If the air is dry, use a cool mist vaporizer or humidifier in your bedroom or your home to help loosen secretions.  · If your cough is worse at night, try to sleep in a semi-upright position.  · Rest as needed.  · Keep all follow-up visits as told by  your health care provider. This is important.    Contact a health care provider if you:  · Have new symptoms.  · Cough up pus.  · Have a cough that does not get better after 2-3 weeks or gets worse.  · Cannot control your cough with cough suppressant medicines and you are losing sleep.  · Have pain that gets worse or pain that is not helped with medicine.  · Have a fever.  · Have unexplained weight loss.  · Have night sweats.  Get help right away if:  · You cough up blood.  · You have difficulty breathing.  · Your heartbeat is very fast.  These symptoms may represent a serious problem that is an emergency. Do not wait to see if the symptoms will go away. Get medical help right away. Call your local emergency services (911 in the [place] Do not drive yourself to the hospital.  Summary  · Coughing is a reflex that clears your throat and your airways. It is normal to cough occasionally, but a cough that happens with other symptoms or lasts a long time may be a sign of a condition that needs treatment.  · Take over-the-counter and prescription medicines only as told by your health care provider.  · Always cover your mouth when you cough.  · Contact a health care provider if you have new symptoms or a cough that does not get better after 2-3 weeks or gets worse.  This information is not intended to replace advice given to you by your health care provider. Make sure you discuss any questions you have with your health care provider.  Document Revised: 01/06/2020 Document Reviewed: 01/06/2020  [employer] Patient Education © 2021 [employer] Inc.

## 2021-12-30 LAB — SARS-COV-2 RNA NOSE QL NAA+PROBE: DETECTED

## 2022-01-03 ENCOUNTER — TELEPHONE (OUTPATIENT)
Dept: INTERNAL MEDICINE | Facility: CLINIC | Age: 77
End: 2022-01-03

## 2022-01-03 NOTE — TELEPHONE ENCOUNTER
Caller: Andrea Rosales Jr.    Relationship: Self    Best call back number: [phone]    What is the best time to reach you: anytime    Who are you requesting to speak with (clinical staff, provider,  specific staff member): Dr. Denis / staff    Do you know the name of the person who called:     What was the call regarding: patient tested positive for COVID on 12/30/21 and indicated that  [facility] has not received the records on this and he is wondering if he still needs to get treat ment for covid    Do you require a callback: YES

## 2022-01-03 NOTE — TELEPHONE ENCOUNTER
Patient verbalized understanding    Rebecca Lowe notified of patient having low urine output. PA to order lasix.

## 2022-01-03 NOTE — TELEPHONE ENCOUNTER
If he is already feeling better, I don't think he needs monoclonal antibodies.  We still have a few days before the window closes, so he should call if he starts to feel worse.

## 2022-01-03 NOTE — TELEPHONE ENCOUNTER
Apparently [facility] never received the order faxed on Thursday(but he was aware he wouldn't get scheduled until this week because they were full). Patient states he is feeling better. Not feeling any body aches. He said his cough has loosened up. Symptoms started 12/28 and patient was seen 12/29 and tested positive 12/30. Please advise if patient still needs to continue with MAB if feeling better.

## 2022-02-01 ENCOUNTER — LAB (OUTPATIENT)
Dept: LAB | Facility: HOSPITAL | Age: 77
End: 2022-02-01

## 2022-02-01 ENCOUNTER — OFFICE VISIT (OUTPATIENT)
Dept: INTERNAL MEDICINE | Facility: CLINIC | Age: 77
End: 2022-02-01

## 2022-02-01 VITALS
BODY MASS INDEX: 25.87 KG/M2 | HEIGHT: 71 IN | DIASTOLIC BLOOD PRESSURE: 70 MMHG | SYSTOLIC BLOOD PRESSURE: 110 MMHG | WEIGHT: 184.8 LBS | HEART RATE: 76 BPM | TEMPERATURE: 98.2 F

## 2022-02-01 DIAGNOSIS — R73.03 PREDIABETES: ICD-10-CM

## 2022-02-01 DIAGNOSIS — I10 PRIMARY HYPERTENSION: Primary | ICD-10-CM

## 2022-02-01 DIAGNOSIS — E78.2 MIXED HYPERLIPIDEMIA: ICD-10-CM

## 2022-02-01 PROCEDURE — 99214 OFFICE O/P EST MOD 30 MIN: CPT | Performed by: INTERNAL MEDICINE

## 2022-02-01 NOTE — ASSESSMENT & PLAN NOTE
Blood pressure is well controlled on Bystolic only. He takes valsartan at a low dose infrequently every third day or so when blood pressures rise, but if he takes both everyday, he has hypotension.

## 2022-02-01 NOTE — PROGRESS NOTES
[facility]     Andrea Rosales Jr.  1945   [id number]      Patient Care Team:  Alberto Denis MD as PCP - General  Alberto Denis MD as PCP - Family Medicine    Chief Complaint::   Chief Complaint   Patient presents with   • Hyperlipidemia   • Hypertension        HPI    The patient has consented to being recorded using [employer].    The patient presents for follow-up of hypertension, hyperlipidemia, and prediabetes.     Upper respiratory infection  The patient reports that he recovering from an upper respiratory infection and still has symptoms of nasal congestion and cough. According to the patient, when he develops cough with respiratory infections, this symptom often lingers. He denies any fatigue. He had symptoms of COVID-19; but did not get tested.    Hypertension  The patient reports that he is not taking his valsartan daily; he take it every 3 days. He reports that he only takes it when his blood pressure is elevated. The patient reports fluctuating blood pressures and does not want to take this daily due to a concern for developing hypotension. The patient denies shortness of breath and chest pain.    Chronic Conditions:  hypertension, hyperlipidemia, and prediabetes.     Patient Active Problem List   Diagnosis   • Hyperlipidemia   • Osteoarthritis   • Palpitations   • Prediabetes   • Hypercholesterolemia   • Primary hypertension   • History of prostate cancer   • Annual physical exam   • Premature atrial contraction   • Localized, secondary osteoarthritis of the shoulder region, right        Past Medical History:   Diagnosis Date   • Arthritis Shoulder, 2019   • Benign essential hypertension    • Cataract Forming, 2018/19   • Diverticulosis Diverticulosis, 9/2019   • GERD (gastroesophageal reflux disease) Appeared to have GERD about 3 years ago   • History of colonic polyps    • History of prostate cancer    • HL (hearing loss) For many years, hearing aid 10,2019, no  difference   • Hypercholesterolemia    • Prediabetes    • Prostate cancer (HCC)     S/P PROSTATECTOMY   • Right-sided back pain    • Visual impairment Driving at night, outside city       Past Surgical History:   Procedure Laterality Date   • COLONOSCOPY  09/2019   • PROSTATECTOMY  1998    Prostate cancer-Radical    • TONSILLECTOMY         Family History   Problem Relation Age of Onset   • Breast cancer Mother    • Cancer Mother         Breast cancer, then spread   • Stroke Father    • Stomach cancer Father         Gastric   • Alcohol abuse Father         But not out of hand   • Cancer Father         Bladder   • Breast cancer Sister    • Cancer Sister         Breast cancer, then spread       Social History     Socioeconomic History   • Marital status:    Tobacco Use   • Smoking status: Never Smoker   • Smokeless tobacco: Never Used   Vaping Use   • Vaping Use: Never used   Substance and Sexual Activity   • Alcohol use: Yes     Alcohol/week: 1.0 standard drink     Types: 1 Cans of beer per week     Comment: Only  drink alcoholic beverages occasionally   • Drug use: No   • Sexual activity: Never       No Known Allergies      Current Outpatient Medications:   •  aspirin 81 MG EC tablet, Take 81 mg by mouth Daily., Disp: , Rfl:   •  atorvastatin (LIPITOR) 40 MG tablet, Take 1 tablet by mouth Daily., Disp: 90 tablet, Rfl: 3  •  Bystolic 5 MG tablet, TAKE 1 TABLET BY MOUTH EVERY DAY, Disp: 90 tablet, Rfl: 3  •  Multiple Vitamin (MULTI-VITAMIN DAILY PO), Take 1 tablet by mouth Daily., Disp: , Rfl:   •  valsartan (DIOVAN) 40 MG tablet, TAKE 1 TABLET BY MOUTH DAILY, Disp: 90 tablet, Rfl: 3  •  Fluad Quadrivalent 0.5 ML prefilled syringe injection, , Disp: , Rfl:   •  Pneumovax 23 25 MCG/0.5ML vaccine, , Disp: , Rfl:     Review of Systems   A review of systems was performed, and the pertinent positives are noted in the HPI.    Vital Signs  Vitals:    02/01/22 1118   BP: 110/70   BP Location: Left arm   Patient  "Position: Sitting   Cuff Size: Large Adult   Pulse: 76   Temp: 98.2 °F (36.8 °C)   Weight: 83.8 kg (184 lb 12.8 oz)   Height: 180.3 cm (70.98\")   PainSc:   3   PainLoc: Arm  Comment: left       Physical Exam  Vitals reviewed.   Constitutional:       Appearance: He is well-developed.   HENT:      Head: Normocephalic and atraumatic.   Cardiovascular:      Rate and Rhythm: Normal rate and regular rhythm.      Heart sounds: Normal heart sounds. No murmur heard.      Pulmonary:      Effort: Pulmonary effort is normal.      Breath sounds: Normal breath sounds.   Neurological:      Mental Status: He is alert and oriented to person, place, and time.          Procedures    ACE III MINI             Assessment/Plan:    Diagnoses and all orders for this visit:    1. Primary hypertension (Primary)  Assessment & Plan:  Blood pressure is well controlled on Bystolic only. He takes valsartan at a low dose infrequently every third day or so when blood pressures rise, but if he takes both everyday, he has hypotension.      2. Mixed hyperlipidemia  Assessment & Plan:   Lipid panel was pending. He will continue atorvastatin and healthy diet.    Orders:  -     Comprehensive Metabolic Panel; Future  -     Lipid Panel; Future    3. Prediabetes  Assessment & Plan:   A1c is pending. The treatment is to continue a healthy diet and avoid weight gain.      Orders:  -     Hemoglobin A1c; Future        Plan of care reviewed with patient at the conclusion of today's visit. Education was provided regarding diagnosis, management, and any prescribed or recommended OTC medications.Patient verbalizes understanding of and agreement with management plan.         Alberto Denis MD           Answers for HPI/ROS submitted by the patient on 2/1/2022  What is the primary reason for your visit?: Physical  Transcribed from ambient dictation for Alberto Denis MD by Arabella Edwards.  02/01/22   17:38 EST    Patient verbalized consent to the visit " recording.

## 2022-02-02 LAB
ALBUMIN SERPL-MCNC: 4.9 G/DL (ref 3.7–4.7)
ALBUMIN/GLOB SERPL: 2.3 {RATIO} (ref 1.2–2.2)
ALP SERPL-CCNC: 83 IU/L (ref 44–121)
ALT SERPL-CCNC: 41 IU/L (ref 0–44)
AST SERPL-CCNC: 41 IU/L (ref 0–40)
BILIRUB SERPL-MCNC: 0.8 MG/DL (ref 0–1.2)
BUN SERPL-MCNC: 23 MG/DL (ref 8–27)
BUN/CREAT SERPL: 20 (ref 10–24)
CALCIUM SERPL-MCNC: 9.9 MG/DL (ref 8.6–10.2)
CHLORIDE SERPL-SCNC: 106 MMOL/L (ref 96–106)
CHOLEST SERPL-MCNC: 102 MG/DL (ref 100–199)
CO2 SERPL-SCNC: 23 MMOL/L (ref 20–29)
CREAT SERPL-MCNC: 1.13 MG/DL (ref 0.76–1.27)
GLOBULIN SER CALC-MCNC: 2.1 G/DL (ref 1.5–4.5)
GLUCOSE SERPL-MCNC: 99 MG/DL (ref 65–99)
HBA1C MFR BLD: 5.5 % (ref 4.8–5.6)
HDLC SERPL-MCNC: 41 MG/DL
LDLC SERPL CALC-MCNC: 47 MG/DL (ref 0–99)
POTASSIUM SERPL-SCNC: 5 MMOL/L (ref 3.5–5.2)
PROT SERPL-MCNC: 7 G/DL (ref 6–8.5)
SODIUM SERPL-SCNC: 142 MMOL/L (ref 134–144)
TRIGL SERPL-MCNC: 64 MG/DL (ref 0–149)
VLDLC SERPL CALC-MCNC: 14 MG/DL (ref 5–40)

## 2022-03-14 ENCOUNTER — TELEPHONE (OUTPATIENT)
Dept: INTERNAL MEDICINE | Facility: CLINIC | Age: 77
End: 2022-03-14

## 2022-03-14 NOTE — TELEPHONE ENCOUNTER
Caller: Andrea Rosales Jr.    Relationship: Self    Best call back number:     [phone]    What is the best time to reach you:     ANY TIME    Who are you requesting to speak with (clinical staff, provider,  specific staff member):     DR DIA OR NURSE    Do you know the name of the person who called:     N/A    What was the call regarding:     PATIENT REQUESTED A CALL BACK WITH RESPONSE TO HIS QUESTION IS A DTAP BOOSTER SHOT TO BE AROUND A     Do you require a callback:     YES

## 2022-03-14 NOTE — TELEPHONE ENCOUNTER
Called pt to clarify his question. They are expecting a new grandchild and wanted to know if he needs a Tdap. Advised we do not show this vaccination in his record. He confirms he does not recall the last time he had a tetanus vaccine. He is going to contact the pharmacy to get the vaccine ( not covered in the office with Medicare)

## 2022-03-17 NOTE — PROGRESS NOTES
[facility]  Office Progress Note  Andrea Rosales Jr.  1945  [address]  [facility] [address]       Visit Date: 03/18/22    PCP: Alberto Denis MD  [address]  [facility] [address]    IDENTIFICATION: A 76 y.o. male orig from [place] retired  from [employer] at [employer]. Does financial consulting part time now.     PROBLEM LIST:   1. History of chest wall pain  2. Hypertension.    1. 6/16 SE 10 min wnl   3. Hyperlipidemia.    1. 7/21 102/71/41/46  2. 2/22 102/64/41/47  4. Palpitations  1. 2016 holter 1.7% pac  5. Carotid disease  1. 5/30/18 C US 0-49% stenosis bilaterally  6. Prediabetes  1. 2/22 A1c 5.5  7. Osteoarthritis.    8. Prostate cancer status post prostatectomy.    9. Diverticulosis       CC:   Chief Complaint   Patient presents with   • Hypertension       Allergies  No Known Allergies    Current Medications    Current Outpatient Medications:   •  aspirin 81 MG EC tablet, Take 81 mg by mouth Daily., Disp: , Rfl:   •  atorvastatin (LIPITOR) 40 MG tablet, Take 1 tablet by mouth Daily., Disp: 90 tablet, Rfl: 3  •  Bystolic 5 MG tablet, TAKE 1 TABLET BY MOUTH EVERY DAY, Disp: 90 tablet, Rfl: 3  •  Multiple Vitamin (MULTI-VITAMIN DAILY PO), Take 1 tablet by mouth Daily., Disp: , Rfl:   •  Pneumovax 23 25 MCG/0.5ML vaccine, , Disp: , Rfl:   •  valsartan (DIOVAN) 40 MG tablet, TAKE 1 TABLET BY MOUTH DAILY, Disp: 90 tablet, Rfl: 3      History of Present Illness   Andrea Rosales Jr. is a 76 y.o. year old male here for follow up.    Today, the patient reports that he is doing well from a cardiovascular standpoint.  Patient states that he is exercising 5 days a week.  He states 3 days out of the week he plays tennis and the other days he is walking.  He denies any anginal/atypical anginal symptoms during this exertion.    Pt denies any chest pain, dyspnea, dyspnea on exertion, orthopnea, PND, palpitations, lower extremity edema, or  "claudication.      OBJECTIVE:  Vitals:    03/18/22 1431   BP: 120/60   BP Location: Left arm   Patient Position: Sitting   Pulse: 69   SpO2: 98%   Weight: 84.8 kg (187 lb)   Height: 180.3 cm (70.98\")     Body mass index is 26.1 kg/m².    Vitals and nursing note reviewed.   Constitutional:       General: Awake. Not in acute distress.     Appearance: Healthy appearance. Well-developed and not in distress.   Eyes:      General: No scleral icterus.     Conjunctiva/sclera: Conjunctivae normal.      Pupils: Pupils are equal, round, and reactive to light.   HENT:      Head: Normocephalic and atraumatic.      Nose: Nose normal.    Mouth/Throat:      Pharynx: Uvula midline.   Neck:      Vascular: No carotid bruit or JVD.      Trachea: No tracheal deviation.   Pulmonary:      Effort: Pulmonary effort is normal.      Breath sounds: Normal breath sounds. No wheezing. No rhonchi. No rales.   Cardiovascular:      Normal rate. Regular rhythm. Normal S1. Normal S2.      Murmurs: There is no murmur.      No gallop. No click. No rub.   Pulses:     Intact distal pulses.   Edema:     Peripheral edema absent.   Abdominal:      General: Bowel sounds are normal.      Palpations: Abdomen is soft. There is no abdominal mass.      Tenderness: There is no abdominal tenderness. There is no guarding.   Musculoskeletal:         General: No tenderness.      Extremities: No clubbing present.     Cervical back: Normal range of motion and neck supple. Skin:     General: Skin is warm and dry. There is no cyanosis.      Findings: No erythema or rash.   Neurological:      Mental Status: Alert, oriented to person, place, and time and oriented to person, place and time.   Psychiatric:         Attention and Perception: Attention normal.         Mood and Affect: Mood normal.         Speech: Speech normal.         Behavior: Behavior normal. Behavior is cooperative.         Diagnostic Data:    ECG 12 Lead    Date/Time: 3/18/2022 3:00 PM  Performed by: Jorje" CHEMO Vigil  Authorized by: Musa Recinos PA-C   Previous ECG: no previous ECG available  Rhythm: sinus rhythm  Rate: normal  BPM: 67  Conduction: left anterior fascicular block  ST Segments: ST segments normal  T inversion: aVR  QRS axis: left  Other: no other findings    Clinical impression: abnormal EKG              ASSESSMENT:   Diagnosis Plan   1. Palpitations     2. Primary hypertension     3. Hypercholesterolemia     4. Prediabetes         PLAN:  1.  Palpitations-patient reports that his current Bystolic administration significantly trolls his previously appreciated palpitations.  The patient denies any significant/pervasive/progressive episodes of palpitations.  Continue current medical regimen.  2.  Hypertension-patient's blood pressure in clinic appreciated at 120/60.  Continue current medical regimen.  3.  Hypercholesterolemia-patient's most recent lipid panel within goal of LDL <100.  Continue current medical regimen  4.  Prediabetes -patient to continue regular exercise.          Dann Max MD, [facility]

## 2022-03-18 ENCOUNTER — OFFICE VISIT (OUTPATIENT)
Dept: CARDIOLOGY | Facility: CLINIC | Age: 77
End: 2022-03-18

## 2022-03-18 VITALS
BODY MASS INDEX: 26.18 KG/M2 | HEIGHT: 71 IN | OXYGEN SATURATION: 98 % | SYSTOLIC BLOOD PRESSURE: 120 MMHG | WEIGHT: 187 LBS | HEART RATE: 69 BPM | DIASTOLIC BLOOD PRESSURE: 60 MMHG

## 2022-03-18 DIAGNOSIS — E78.00 HYPERCHOLESTEROLEMIA: ICD-10-CM

## 2022-03-18 DIAGNOSIS — R73.03 PREDIABETES: ICD-10-CM

## 2022-03-18 DIAGNOSIS — I10 PRIMARY HYPERTENSION: ICD-10-CM

## 2022-03-18 DIAGNOSIS — R00.2 PALPITATIONS: Primary | ICD-10-CM

## 2022-03-18 PROCEDURE — 99214 OFFICE O/P EST MOD 30 MIN: CPT

## 2022-03-18 PROCEDURE — 93000 ELECTROCARDIOGRAM COMPLETE: CPT

## 2022-06-09 RX ORDER — ATORVASTATIN CALCIUM 40 MG/1
40 TABLET, FILM COATED ORAL DAILY
Qty: 90 TABLET | Refills: 3 | Status: SHIPPED | OUTPATIENT
Start: 2022-06-09

## 2022-08-09 ENCOUNTER — OFFICE VISIT (OUTPATIENT)
Dept: INTERNAL MEDICINE | Facility: CLINIC | Age: 77
End: 2022-08-09

## 2022-08-09 ENCOUNTER — LAB (OUTPATIENT)
Dept: LAB | Facility: HOSPITAL | Age: 77
End: 2022-08-09

## 2022-08-09 VITALS
WEIGHT: 188 LBS | SYSTOLIC BLOOD PRESSURE: 110 MMHG | TEMPERATURE: 98.4 F | HEART RATE: 64 BPM | DIASTOLIC BLOOD PRESSURE: 62 MMHG | HEIGHT: 71 IN | BODY MASS INDEX: 26.32 KG/M2

## 2022-08-09 DIAGNOSIS — E78.2 MIXED HYPERLIPIDEMIA: ICD-10-CM

## 2022-08-09 DIAGNOSIS — Z00.00 ANNUAL PHYSICAL EXAM: Primary | ICD-10-CM

## 2022-08-09 DIAGNOSIS — Z85.46 HISTORY OF PROSTATE CANCER: ICD-10-CM

## 2022-08-09 DIAGNOSIS — G89.29 CHRONIC LEFT SHOULDER PAIN: ICD-10-CM

## 2022-08-09 DIAGNOSIS — M25.512 CHRONIC LEFT SHOULDER PAIN: ICD-10-CM

## 2022-08-09 DIAGNOSIS — I10 PRIMARY HYPERTENSION: ICD-10-CM

## 2022-08-09 DIAGNOSIS — R73.03 PREDIABETES: ICD-10-CM

## 2022-08-09 PROCEDURE — 96160 PT-FOCUSED HLTH RISK ASSMT: CPT | Performed by: INTERNAL MEDICINE

## 2022-08-09 PROCEDURE — 1125F AMNT PAIN NOTED PAIN PRSNT: CPT | Performed by: INTERNAL MEDICINE

## 2022-08-09 PROCEDURE — 1159F MED LIST DOCD IN RCRD: CPT | Performed by: INTERNAL MEDICINE

## 2022-08-09 PROCEDURE — G0439 PPPS, SUBSEQ VISIT: HCPCS | Performed by: INTERNAL MEDICINE

## 2022-08-09 PROCEDURE — 1170F FXNL STATUS ASSESSED: CPT | Performed by: INTERNAL MEDICINE

## 2022-08-09 PROCEDURE — 99397 PER PM REEVAL EST PAT 65+ YR: CPT | Performed by: INTERNAL MEDICINE

## 2022-08-09 NOTE — PROGRESS NOTES
QUICK REFERENCE INFORMATION:  The ABCs of the Annual Wellness Visit    Subsequent Medicare Wellness Visit    HEALTH RISK ASSESSMENT    1945    Recent Hospitalizations:  No hospitalization(s) within the last year..        Current Medical Providers:  Patient Care Team:  Alberto Denis MD as PCP - General  Alberto Denis MD as PCP - Family Medicine  Dann Max MD as Consulting Physician (Cardiology)        Smoking Status:  Social History     Tobacco Use   Smoking Status Never Smoker   Smokeless Tobacco Never Used       Alcohol Consumption:  Social History     Substance and Sexual Activity   Alcohol Use Yes   • Alcohol/week: 1.0 standard drink   • Types: 1 Cans of beer per week    Comment: Only  drink alcoholic beverages occasionally       Depression Screen:   PHQ-2/PHQ-9 Depression Screening 8/9/2022   Retired PHQ-9 Total Score -   Retired Total Score -   Little Interest or Pleasure in Doing Things 0-->not at all   Feeling Down, Depressed or Hopeless 0-->not at all   PHQ-9: Brief Depression Severity Measure Score 0       Health Habits and Functional and Cognitive Screening:  Functional & Cognitive Status 8/9/2022   Do you have difficulty preparing food and eating? No   Do you have difficulty bathing yourself, getting dressed or grooming yourself? No   Do you have difficulty using the toilet? No   Do you have difficulty moving around from place to place? No   Do you have trouble with steps or getting out of a bed or a chair? No   Current Diet Well Balanced Diet   Dental Exam Up to date   Eye Exam Not up to date   Exercise (times per week) 6 times per week   Current Exercises Include Walking;Tennis   Current Exercise Activities Include -   Do you need help using the phone?  No   Are you deaf or do you have serious difficulty hearing?  Yes   Do you need help with transportation? No   Do you need help shopping? No   Do you need help preparing meals?  No   Do you need help with housework?  No    Do you need help with laundry? No   Do you need help taking your medications? No   Do you need help managing money? No   Do you ever drive or ride in a car without wearing a seat belt? No   Have you felt unusual stress, anger or loneliness in the last month? No   Who do you live with? Spouse   If you need help, do you have trouble finding someone available to you? No   Have you been bothered in the last four weeks by sexual problems? No   Do you have difficulty concentrating, remembering or making decisions? No       Fall Risk Screen:  STEADI Fall Risk Assessment was completed, and patient is at LOW risk for falls.Assessment completed on:8/9/2022    ACE III MINI        Does the patient have evidence of cognitive impairment? No    Aspirin use counseling: Taking ASA appropriately as indicated    Recent Lab Results:  CMP:  Lab Results   Component Value Date    BUN 23 02/01/2022    CREATININE 1.13 02/01/2022    EGFRIFNONA 62 02/01/2022    EGFRIFAFRI 72 02/01/2022    BCR 20 02/01/2022     02/01/2022    K 5.0 02/01/2022    CO2 23 02/01/2022    CALCIUM 9.9 02/01/2022    PROTENTOTREF 7.0 02/01/2022    ALBUMIN 4.9 (H) 02/01/2022    LABGLOBREF 2.1 02/01/2022    LABIL2 2.3 (H) 02/01/2022    BILITOT 0.8 02/01/2022    ALKPHOS 83 02/01/2022    AST 41 (H) 02/01/2022    ALT 41 02/01/2022     HbA1c:  Lab Results   Component Value Date    HGBA1C 5.5 02/01/2022    HGBA1C 5.5 07/30/2021     Microalbumin:  Lab Results   Component Value Date    MICROALBUR 10.3 07/30/2021     Lipid Panel  Lab Results   Component Value Date    CHOL 120 01/10/2019    TRIG 64 02/01/2022    HDL 41 02/01/2022    LDL 47 02/01/2022    AST 41 (H) 02/01/2022    ALT 41 02/01/2022       Visual Acuity:  No exam data present    Age-appropriate Screening Schedule:  Refer to the list below for future screening recommendations based on patient's age, sex and/or medical conditions. Orders for these recommended tests are listed in the plan section. The patient has  been provided with a written plan.    Health Maintenance   Topic Date Due   • ZOSTER VACCINE (1 of 2) Never done   • INFLUENZA VACCINE  10/01/2022   • LIPID PANEL  02/01/2023   • TDAP/TD VACCINES (2 - Td or Tdap) 03/16/2032        Subjective   History of Present Illness    Andrea Rosales Jr. is a 76 y.o. male who presents for a Subsequent Wellness Visit and for follow-up of hypertension, prediabetes, and hyperlipidemia. He sees his urologist annually for follow-up of prostate cancer and cardiology for palpitations.    Health maintenance  The patient states that, overall, he feels good. He has been staying active, playing tennis twice a week and walking.    Left shoulder pain  The patient states that in 02/2022 he was hurriedly shoveling snow and has had soreness in his left shoulder since then. He states that it feels like it catches when he is using it. Arthritis was previously discovered in his right shoulder, and he feels that the left shoulder feels similar to that. He states that it has gradually gotten better over time. He is amenable to a trial of PT. He states that he previously participated in PT for his right shoulder at a [facility] facility, and he was pleased with the result.    Skin lesion  The patient states that he has a small lesion on the left side of his face. The area darkens sometimes and other times will seem to subside, but is recurrent. He describes it as being slightly itchy. In the past, he has had a couple of places removed from his left cheek. He states that he has been seen by his dermatologist, Dr. Musa Galarza, in 03/2022. The small lesion appeared after he has last seen Dr. Galarza.    Hypertension  He denies experiencing palpitations. He reports he does not feel he needs valsartan that often, and has been taking it more sporadically. He states that he took it this morning before he came in, but had not taken it for at least a week prior to today, 08/09/2022. He has a blood pressure monitor  at home, but states he does not use it often and is unable to recall what his blood pressure typically is when he is not taking valsartan.    Prostate cancer  He has previously seen his urologist every other year. He last saw the urologist in early 09/2021 and was instructed to return in 2022. However, he notes that he is going to continue to go every other year, because his results came back normal. He has not had any evidence of disease for approximately 24 years.     CHRONIC CONDITIONS: Hypertension, prediabetes, and hyperlipidemia.    The following portions of the patient's history were reviewed and updated as appropriate: allergies, current medications, past family history, past medical history, past social history, past surgical history and problem list.    Outpatient Medications Prior to Visit   Medication Sig Dispense Refill   • aspirin 81 MG EC tablet Take 81 mg by mouth Daily.     • atorvastatin (LIPITOR) 40 MG tablet Take 1 tablet by mouth Daily. 90 tablet 3   • Bystolic 5 MG tablet TAKE 1 TABLET BY MOUTH EVERY DAY 90 tablet 3   • Multiple Vitamin (MULTI-VITAMIN DAILY PO) Take 1 tablet by mouth Daily.     • valsartan (DIOVAN) 40 MG tablet TAKE 1 TABLET BY MOUTH DAILY (Patient taking differently: Take 40 mg by mouth 1 (One) Time Per Week.) 90 tablet 3   • Pneumovax 23 25 MCG/0.5ML vaccine        No facility-administered medications prior to visit.       Patient Active Problem List   Diagnosis   • Hyperlipidemia   • Osteoarthritis   • Palpitations   • Prediabetes   • Hypercholesterolemia   • Primary hypertension   • History of prostate cancer   • Annual physical exam   • Premature atrial contraction   • Localized, secondary osteoarthritis of the shoulder region, right       Advance Care Planning:  ACP discussion was held with the patient during this visit. Patient has an advance directive (not in EMR), copy requested.    Identification of Risk Factors:  Risk factors include: Advance Directive  Discussion.    Review of Systems  Otherwise unremarkable.    Compared to one year ago, the patient feels his physical health is the same.  Compared to one year ago, the patient feels his mental health is the same.    Objective     Physical Exam  Vitals and nursing note reviewed.   Constitutional:       Appearance: He is well-developed.   HENT:      Head: Normocephalic and atraumatic.      Right Ear: External ear normal.      Left Ear: External ear normal.      Nose: Nose normal.      Mouth/Throat:      Pharynx: No oropharyngeal exudate.   Eyes:      Conjunctiva/sclera: Conjunctivae normal.      Pupils: Pupils are equal, round, and reactive to light.   Neck:      Thyroid: No thyromegaly.      Vascular: No JVD.   Cardiovascular:      Rate and Rhythm: Normal rate and regular rhythm.      Heart sounds: Normal heart sounds. No murmur heard.    No friction rub. No gallop.   Pulmonary:      Effort: Pulmonary effort is normal. No respiratory distress.      Breath sounds: Normal breath sounds. No wheezing or rales.   Chest:      Chest wall: No tenderness.   Abdominal:      General: Bowel sounds are normal. There is no distension.      Palpations: Abdomen is soft. There is no mass.      Tenderness: There is no abdominal tenderness. There is no guarding or rebound.      Hernia: No hernia is present.   Musculoskeletal:         General: No tenderness. Normal range of motion.      Cervical back: Normal range of motion and neck supple.   Lymphadenopathy:      Cervical: No cervical adenopathy.   Skin:     General: Skin is warm and dry.      Findings: No erythema or rash.   Neurological:      Mental Status: He is alert and oriented to person, place, and time.      Cranial Nerves: No cranial nerve deficit.      Sensory: No sensory deficit.      Motor: No abnormal muscle tone.      Coordination: Coordination normal.      Deep Tendon Reflexes: Reflexes normal.   Psychiatric:         Behavior: Behavior normal.         Thought Content:  "Thought content normal.         Judgment: Judgment normal.        He has diffuse seborrheic keratoses and lentigines. No suspicious lesions.    Procedures     Vitals:    08/09/22 1035   BP: 110/62   BP Location: Left arm   Patient Position: Sitting   Cuff Size: Adult   Pulse: 64   Temp: 98.4 °F (36.9 °C)   TempSrc: Temporal   Weight: 85.3 kg (188 lb)   Height: 181.5 cm (71.46\")   PainSc: 0-No pain       BMI is >= 25 and <30. (Overweight) The following options were offered after discussion;: exercise counseling/recommendations and nutrition counseling/recommendations      Assessment & Plan   Problem List Items Addressed This Visit        Cardiac and Vasculature    Hyperlipidemia    Relevant Medications    atorvastatin (LIPITOR) 40 MG tablet    Other Relevant Orders    Comprehensive Metabolic Panel    Lipid Panel    Primary hypertension    Relevant Medications    valsartan (DIOVAN) 40 MG tablet    Bystolic 5 MG tablet    Other Relevant Orders    CBC & Differential    Microalbumin / Creatinine Urine Ratio - Urine, Clean Catch       Endocrine and Metabolic    Prediabetes    Relevant Orders    Hemoglobin A1c       Health Encounters    Annual physical exam - Primary       Hematology and Neoplasia    History of prostate cancer      Other Visit Diagnoses     Chronic left shoulder pain        Relevant Orders    Ambulatory Referral to Physical Therapy Evaluate and treat      1. Prevention  - Overall, he remains in very good health with good lifestyle habits. He is fully vaccinated against COVID-19 and up to date on colorectal cancer screening.    2. Hypertension  - Blood pressure is well controlled. He admits he often does not take valsartan, but he took it this morning. I have suggested that he stay off of it and monitor his blood pressure. Goal is 140/90 and under. He will continue Bystolic, which also controls palpitations.    3. Prediabetes  - A1c is pending. The treatment remains healthy diet and avoidance of weight " gain.    5. Hyperlipidemia  - Lipid panel is pending. Continue atorvastatin and healthy diet.    6. Left shoulder pain since 02/2022  - Possible soft tissue injury. Refer to physical therapy.    7. History of prostate cancer  - He will continue follow-up with his urologist.    Patient Self-Management and Personalized Health Advice  The patient has been provided with information about: diet and exercise and preventive services including:   · Annual Wellness Visit (AWV).    Outpatient Encounter Medications as of 8/9/2022   Medication Sig Dispense Refill   • aspirin 81 MG EC tablet Take 81 mg by mouth Daily.     • atorvastatin (LIPITOR) 40 MG tablet Take 1 tablet by mouth Daily. 90 tablet 3   • Bystolic 5 MG tablet TAKE 1 TABLET BY MOUTH EVERY DAY 90 tablet 3   • Multiple Vitamin (MULTI-VITAMIN DAILY PO) Take 1 tablet by mouth Daily.     • valsartan (DIOVAN) 40 MG tablet TAKE 1 TABLET BY MOUTH DAILY (Patient taking differently: Take 40 mg by mouth 1 (One) Time Per Week.) 90 tablet 3   • [DISCONTINUED] Pneumovax 23 25 MCG/0.5ML vaccine        No facility-administered encounter medications on file as of 8/9/2022.       Reviewed use of high risk medication in the elderly: yes  Reviewed for potential of harmful drug interactions in the elderly: yes    Follow Up:  Return in about 1 year (around 8/9/2023) for Medicare Wellness.     There are no Patient Instructions on file for this visit.    An After Visit Summary and PPPS with all of these plans were given to the patient.          Transcribed from ambient dictation for Alberto Denis MD by CRISTIAN [employer] .  08/09/22   11:57 EDT    Patient verbalized consent to the visit recording.

## 2022-08-10 LAB
ALBUMIN SERPL-MCNC: 4.7 G/DL (ref 3.5–5.2)
ALBUMIN/CREAT UR: 4 MG/G CREAT (ref 0–29)
ALBUMIN/GLOB SERPL: 2 G/DL
ALP SERPL-CCNC: 78 U/L (ref 39–117)
ALT SERPL-CCNC: 43 U/L (ref 1–41)
AST SERPL-CCNC: 36 U/L (ref 1–40)
BASOPHILS # BLD AUTO: 0.04 10*3/MM3 (ref 0–0.2)
BASOPHILS NFR BLD AUTO: 0.5 % (ref 0–1.5)
BILIRUB SERPL-MCNC: 0.8 MG/DL (ref 0–1.2)
BUN SERPL-MCNC: 19 MG/DL (ref 8–23)
BUN/CREAT SERPL: 21.8 (ref 7–25)
CALCIUM SERPL-MCNC: 9.8 MG/DL (ref 8.6–10.5)
CHLORIDE SERPL-SCNC: 106 MMOL/L (ref 98–107)
CHOLEST SERPL-MCNC: 97 MG/DL (ref 0–200)
CO2 SERPL-SCNC: 22.9 MMOL/L (ref 22–29)
CREAT SERPL-MCNC: 0.87 MG/DL (ref 0.76–1.27)
CREAT UR-MCNC: 173.9 MG/DL
EGFRCR SERPLBLD CKD-EPI 2021: 88.9 ML/MIN/1.73
EOSINOPHIL # BLD AUTO: 0.71 10*3/MM3 (ref 0–0.4)
EOSINOPHIL NFR BLD AUTO: 8 % (ref 0.3–6.2)
ERYTHROCYTE [DISTWIDTH] IN BLOOD BY AUTOMATED COUNT: 12.9 % (ref 12.3–15.4)
GLOBULIN SER CALC-MCNC: 2.3 GM/DL
GLUCOSE SERPL-MCNC: 100 MG/DL (ref 65–99)
HBA1C MFR BLD: 5.5 % (ref 4.8–5.6)
HCT VFR BLD AUTO: 47.1 % (ref 37.5–51)
HDLC SERPL-MCNC: 39 MG/DL (ref 40–60)
HGB BLD-MCNC: 16.2 G/DL (ref 13–17.7)
IMM GRANULOCYTES # BLD AUTO: 0.02 10*3/MM3 (ref 0–0.05)
IMM GRANULOCYTES NFR BLD AUTO: 0.2 % (ref 0–0.5)
LDLC SERPL CALC-MCNC: 41 MG/DL (ref 0–100)
LYMPHOCYTES # BLD AUTO: 2.31 10*3/MM3 (ref 0.7–3.1)
LYMPHOCYTES NFR BLD AUTO: 26.1 % (ref 19.6–45.3)
MCH RBC QN AUTO: 31.3 PG (ref 26.6–33)
MCHC RBC AUTO-ENTMCNC: 34.4 G/DL (ref 31.5–35.7)
MCV RBC AUTO: 90.9 FL (ref 79–97)
MICROALBUMIN UR-MCNC: 6.5 UG/ML
MONOCYTES # BLD AUTO: 0.8 10*3/MM3 (ref 0.1–0.9)
MONOCYTES NFR BLD AUTO: 9 % (ref 5–12)
NEUTROPHILS # BLD AUTO: 4.98 10*3/MM3 (ref 1.7–7)
NEUTROPHILS NFR BLD AUTO: 56.2 % (ref 42.7–76)
NRBC BLD AUTO-RTO: 0 /100 WBC (ref 0–0.2)
PLATELET # BLD AUTO: 141 10*3/MM3 (ref 140–450)
POTASSIUM SERPL-SCNC: 4.3 MMOL/L (ref 3.5–5.2)
PROT SERPL-MCNC: 7 G/DL (ref 6–8.5)
RBC # BLD AUTO: 5.18 10*6/MM3 (ref 4.14–5.8)
SODIUM SERPL-SCNC: 142 MMOL/L (ref 136–145)
TRIGL SERPL-MCNC: 86 MG/DL (ref 0–150)
VLDLC SERPL CALC-MCNC: 17 MG/DL (ref 5–40)
WBC # BLD AUTO: 8.86 10*3/MM3 (ref 3.4–10.8)

## 2022-08-26 ENCOUNTER — HOSPITAL ENCOUNTER (OUTPATIENT)
Dept: PHYSICAL THERAPY | Facility: HOSPITAL | Age: 77
Setting detail: THERAPIES SERIES
Discharge: HOME | End: 2022-08-26

## 2022-08-26 DIAGNOSIS — G89.29 CHRONIC LEFT SHOULDER PAIN: Primary | ICD-10-CM

## 2022-08-26 DIAGNOSIS — M25.512 CHRONIC LEFT SHOULDER PAIN: Primary | ICD-10-CM

## 2022-08-26 PROCEDURE — 97161 PT EVAL LOW COMPLEX 20 MIN: CPT

## 2022-08-26 NOTE — THERAPY EVALUATION
"    Outpatient Physical Therapy Ortho Initial Evaluation  [facility]     Patient Name: Andrea Rosales Jr.  : 1945  MRN: [id number]  Today's Date: 2022      Visit Date: 2022    Patient Active Problem List   Diagnosis   • Hyperlipidemia   • Osteoarthritis   • Palpitations   • Prediabetes   • Hypercholesterolemia   • Primary hypertension   • History of prostate cancer   • Annual physical exam   • Premature atrial contraction   • Localized, secondary osteoarthritis of the shoulder region, right        Past Medical History:   Diagnosis Date   • Arthritis Shoulder,    • Benign essential hypertension    • Cataract Forming,    • Diverticulosis Diverticulosis, 2019   • GERD (gastroesophageal reflux disease) Appeared to have GERD about 3 years ago   • History of colonic polyps    • History of prostate cancer    • HL (hearing loss) For many years, hearing aid 10,2019, no difference   • Hypercholesterolemia    • Prediabetes    • Prostate cancer (HCC)     S/P PROSTATECTOMY   • Right-sided back pain    • Visual impairment Driving at night, outside city        Past Surgical History:   Procedure Laterality Date   • COLONOSCOPY  2019   • PROSTATECTOMY      Prostate cancer-Radical    • TONSILLECTOMY         Visit Dx:     ICD-10-CM ICD-9-CM   1. Chronic left shoulder pain  M25.512 719.41    G89.29 338.29          Patient History     Row Name 22 1100 22 1118          History    Chief Complaint Pain  -LF --     Type of Pain -- Upper Extremity / Arm (P)    -patient     Date Current Problem(s) Began -- 02/10/22 (P)    -patient     Brief Description of Current Complaint Onset of left shoulder pain after quick sweeping some snow leading to his garage back in February.  Symptoms have improved since onset.  Still bothers him with certain activities  -LF Left arm/shoulder - \"catches\" at certain levels, some pain (P)    -patient     Patient/Caregiver Goals -- Relieve pain;Return to prior " level of function;Know what to do to help the symptoms (P)    -patient     Hand Dominance -- right-handed (P)    -patient     Occupation/sports/leisure activities -- Tennis, walking (P)    -patient     Patient seeing anyone else for problem(s)? -- No (P)    -patient            Pain     Pain Location Shoulder  -LF --     Pain at Present 0  -LF --     Pain at Best 0  -LF --     Pain at Worst 3  -LF --     Pain Frequency Intermittent  -LF --     Pain Description Dull;Discomfort  -LF --     What Performance Factors Make the Current Problem(s) WORSE? reaching overhead and back, sometimes sore sleeping on left side  -LF --            Fall Risk Assessment    Any falls in the past year: -- No (P)    -patient            Services    Prior Rehab/Home Health Experiences -- No (P)    -patient     Are you currently receiving Home Health services -- No (P)    -patient     Do you plan to receive Home Health services in the near future -- No (P)    -patient            Daily Activities    Primary Language -- English (P)    -patient     How does patient learn best? -- Listening;Reading;Demonstration (P)    -patient     Pt Participated in POC and Goals Yes  -LF --            Safety    Are you being hurt, hit, or frightened by anyone at home or in your life? -- No (P)    -patient     Are you being neglected by a caregiver -- No (P)    -patient     Have you had any of the following issues with -- N/A (P)    -patient           User Key  (r) = Recorded By, (t) = Taken By, (c) = Cosigned By    Initials Name Provider Type     Vielka Young PT Physical Therapist    patient Andrea Rosales Jr. --                 PT Ortho     Row Name 08/26/22 1100       Posture/Observations    Alignment Options Forward head;Rounded shoulders  -LF    Forward Head Mild  -LF    Rounded Shoulders Mild  -LF       Special Tests/Palpation    Special Tests/Palpation Shoulder  -LF       Shoulder Girdle Accessory Motions    Shoulder Girdle Accessory Motions  Tested? Yes  -LF    Posterior glide of humerus Hypomobile  -LF    Inferior glide of humerus Hypomobile  -LF       Shoulder Impingement/Rotator Cuff Special Tests    Betts-Gabriel Test (RC Lesion vs. Bursitis) Left:;Negative  -LF    Neer Impingement Test (RC Lesion vs. Bursitis) Left:;Negative  -LF    Full Can Test (RC Lesion) Left:;Negative  -LF    Empty Can Test (RC Lesion) Left:;Negative  -LF    Speed's Test (LH of Biceps Lesion) Left:;Negative  -LF       Shoulder Girdle Palpation    Shoulder Girdle Palpation? Yes  -LF    AC Joint Tender  -LF       General ROM    LT Upper Ext Lt Shoulder ABduction;Lt Shoulder Extension;Lt Shoulder Flexion;Lt Shoulder External Rotation;Lt Shoulder Internal Rotation;Comment  -LF    GENERAL ROM COMMENTS R shoulder 135flex/130abd  -LF       Left Upper Ext    Lt Shoulder Abduction AROM 120  -LF    Lt Shoulder Flexion AROM 125  -LF    Lt Shoulder Flexion PROM 127  -LF    Lt Shoulder External Rotation AROM MARIO to C7 and WNL's as compared to L  -LF    Lt Shoulder External Rotation PROM 50  supine at 80 abduction  -LF    Lt Shoulder Internal Rotation PROM 60  supine at 80 abduction  -LF    Lt Upper Extremity Comments  pain end range flex and abduction  -LF       MMT (Manual Muscle Testing)    General MMT Comments L shoulder strength 4+/5 and without increased pain.  shoulder strength tested below 90  -LF       Sensation    Sensation WNL? WNL  -LF       Pathomechanics    Upper Extremity Pathomechanics Excessive scapular elevation;Limited scapular upward rotation;Limited thoracic extension  -LF          User Key  (r) = Recorded By, (t) = Taken By, (c) = Cosigned By    Initials Name Provider Type     Vielka Young PT Physical Therapist              Therapy Education  Education Details: HEP provided for AAROM cane flex, scaption, ER; rows, sweeps, IR, ER.  Issued red and green bands  Given: HEP  Program: New  How Provided: Verbal, Demonstration, Written  Provided to: Patient  Level of  Understanding: Verbalized, Demonstrated      PT OP Goals     Row Name 08/26/22 1100          PT Short Term Goals    STG Date to Achieve 09/23/22  -LF     STG 1 Patient to demonstrate painfree L shoulder ROM WNL's as compared to R  -LF     STG 1 Progress New  -LF            Long Term Goals    LTG Date to Achieve 10/07/22  -LF     LTG 1 Patient indepent with HEP for management of symptoms.  -LF     LTG 1 Progress New  -LF     LTG 2 Patient to report 1 on QDash for washing back  -LF     LTG 2 Progress New  -LF            Time Calculation    PT Goal Re-Cert Due Date 11/24/22  -LF           User Key  (r) = Recorded By, (t) = Taken By, (c) = Cosigned By    Initials Name Provider Type    Vielka Gorman, PT Physical Therapist                 PT Assessment/Plan     Row Name 08/26/22 1100          PT Assessment    Functional Limitations Performance in sport activities;Performance in leisure activities  -LF     Impairments Muscle strength;Joint mobility  -LF     Assessment Comments Patient presents with stable signs and symptoms of low complexity with improving chronic left shoulder pain.  He has pain, tightness, and limited ROM.  Will benefit from continued treatment to minimize pain and address deficits to allow for normal functional painfree use LUE  -LF     Please refer to paper survey for additional self-reported information Yes  -LF     Rehab Potential Excellent  -LF     Patient/caregiver participated in establishment of treatment plan and goals Yes  -LF     Patient would benefit from skilled therapy intervention Yes  -LF            PT Plan    PT Frequency 1x/week  -LF     Predicted Duration of Therapy Intervention (PT) 2-3 visits  -LF     Planned CPT's? PT EVAL LOW COMPLEXITY: 63986;PT THER PROC EA 15 MIN: 51984;PT THER ACT EA 15 MIN: 13464;PT MANUAL THERAPY EA 15 MIN: 03024;PT HOT OR COLD PACK TREAT MCARE  -LF     PT Plan Comments Patient to follow-up in 2 weeks.  Progress HEP, and include manual therapy  -LF            User Key  (r) = Recorded By, (t) = Taken By, (c) = Cosigned By    Initials Name Provider Type     Vielka Young, PT Physical Therapist                       Outcome Measure Options: Quick DASH  Quick DASH  Open a tight or new jar.: No Difficulty  Do heavy household chores (e.g., wash walls, wash floors): No Difficulty  Carry a shopping bag or briefcase: No Difficulty  Wash your back: Mild Difficulty  Use a knife to cut food: No Difficulty  Recreational activities in which you take some force or impact through your arm, should or hand (e.g. golf, hammering, tennis, etc.): No Difficulty  During the past week, to what extent has your arm, shoulder, or hand problem interfered with your normal social activites with family, friends, neighbors or groups?: Not at all  During the past week, were you limited in your work or other regular daily activities as a result of your arm, shoulder or hand problem?: Not limited at all  Arm, Shoulder, or hand pain: Mild  Tingling (pins and needles) in your arm, shoulder, or hand: None  During the past week, how much difficulty have you had sleeping because of the pain in your arm, shoulder or hand?: Mild Difficulty  Number of Questions Answered: 11  Quick DASH Score: 6.82         Time Calculation:     Start Time: 1100  Untimed Charges  PT Eval/Re-eval Minutes: 60  Total Minutes  Untimed Charges Total Minutes: 60   Total Minutes: 60     Therapy Charges for Today     Code Description Service Date Service Provider Modifiers Qty    79408360653 HC PT EVAL LOW COMPLEXITY 4 8/26/2022 Vielka Young, PT GP 1          PT G-Codes  Outcome Measure Options: Quick DASH  Quick DASH Score: 6.82         Vielka Young PT  8/26/2022

## 2022-09-06 RX ORDER — NEBIVOLOL 5 MG/1
TABLET ORAL
Qty: 90 TABLET | Refills: 3 | Status: SHIPPED | OUTPATIENT
Start: 2022-09-06

## 2022-09-09 ENCOUNTER — APPOINTMENT (OUTPATIENT)
Dept: PHYSICAL THERAPY | Facility: HOSPITAL | Age: 77
End: 2022-09-09

## 2022-09-15 ENCOUNTER — HOSPITAL ENCOUNTER (OUTPATIENT)
Dept: PHYSICAL THERAPY | Facility: HOSPITAL | Age: 77
Setting detail: THERAPIES SERIES
Discharge: HOME | End: 2022-09-15

## 2022-09-15 DIAGNOSIS — M25.512 CHRONIC LEFT SHOULDER PAIN: Primary | ICD-10-CM

## 2022-09-15 DIAGNOSIS — G89.29 CHRONIC LEFT SHOULDER PAIN: Primary | ICD-10-CM

## 2022-09-15 PROCEDURE — 97140 MANUAL THERAPY 1/> REGIONS: CPT

## 2022-09-15 PROCEDURE — 97110 THERAPEUTIC EXERCISES: CPT

## 2022-09-15 NOTE — THERAPY TREATMENT NOTE
Outpatient Physical Therapy Ortho Treatment Note   [place]     Patient Name: Andrea Rosales Jr.  : 1945  MRN: [id number]  Today's Date: 9/15/2022      Visit Date: 09/15/2022    Visit Dx:    ICD-10-CM ICD-9-CM   1. Chronic left shoulder pain  M25.512 719.41    G89.29 338.29       Patient Active Problem List   Diagnosis   • Hyperlipidemia   • Osteoarthritis   • Palpitations   • Prediabetes   • Hypercholesterolemia   • Primary hypertension   • History of prostate cancer   • Annual physical exam   • Premature atrial contraction   • Localized, secondary osteoarthritis of the shoulder region, right        Past Medical History:   Diagnosis Date   • Arthritis Shoulder,    • Benign essential hypertension    • Cataract Forming,    • Diverticulosis Diverticulosis, 2019   • GERD (gastroesophageal reflux disease) Appeared to have GERD about 3 years ago   • History of colonic polyps    • History of prostate cancer    • HL (hearing loss) For many years, hearing aid 10,2019, no difference   • Hypercholesterolemia    • Prediabetes    • Prostate cancer (HCC)     S/P PROSTATECTOMY   • Right-sided back pain    • Visual impairment Driving at night, outside city        Past Surgical History:   Procedure Laterality Date   • COLONOSCOPY  2019   • PROSTATECTOMY  1998    Prostate cancer-Radical    • TONSILLECTOMY              PT Assessment/Plan     Row Name 09/15/22 1113          PT Assessment    Assessment Comments discomfort and tightness end ROM L shoulder.  L shoulder ROM more restricted compared to right.  Doing well overall and he plans to continue with HEP  -LF            PT Plan    PT Plan Comments Patient plans to cont. independently w/ HEP and will call for follow-up prn  -LF           User Key  (r) = Recorded By, (t) = Taken By, (c) = Cosigned By    Initials Name Provider Type    Vielka Gorman, PT Physical Therapist                   OP Exercises     Row Name 09/15/22 1113              Subjective Comments    Subjective Comments Shoulder is doing better  -LF              Subjective Pain    Able to rate subjective pain? yes  -LF      Pre-Treatment Pain Level 0  -LF      Post-Treatment Pain Level 0  -LF              Total Minutes    36441 - PT Therapeutic Exercise Minutes 30  -LF      17765 - PT Manual Therapy Minutes 10  -LF              Exercise 1    Exercise Name 1 UBE  -LF      Time 1 4' alt  -LF              Exercise 2    Exercise Name 2 rows, sweeps, IR, ER, add  -LF      Reps 2 20 each  -LF      Additional Comments green band for IR and add, red for others  -LF              Exercise 3    Exercise Name 3 horz. pull aparts bilateral, single arm red band  -LF      Reps 3 10/5  -LF              Exercise 4    Exercise Name 4 B ER red band  -LF      Reps 4 10  -LF              Exercise 5    Exercise Name 5 scapular wall slides with yellow band  -LF      Reps 5 10  -LF              Exercise 6    Exercise Name 6 wall circles  -LF      Reps 6 10 each  -LF              Exercise 7    Exercise Name 7 seated thoracic extension w/ rolled towel  -LF      Reps 7 10  -LF              Exercise 8    Exercise Name 8 door way stretch, supine pec stretch  -LF      Time 8 30s/1min  -LF            User Key  (r) = Recorded By, (t) = Taken By, (c) = Cosigned By    Initials Name Provider Type    LF Vielka Young, AKIL Physical Therapist                   Manual Rx (last 36 hours)     Manual Treatments     Row Name 09/15/22 1115             Total Minutes    97786 - PT Manual Therapy Minutes 10  -LF              Manual Rx 1    Manual Rx 1 Location L shoulder  -LF      Manual Rx 1 Type Grade II-III inferior and posterior glides, PROM w/ end range stretch flex, scaption, abd  -LF            User Key  (r) = Recorded By, (t) = Taken By, (c) = Cosigned By    Initials Name Provider Type    LF Vielka Young PT Physical Therapist                    Therapy Education  Education Details: Progressed HEP for new exercises  added today  Given: HEP  Program: Reinforced, Progressed  How Provided: Verbal, Demonstration, Written  Provided to: Patient  Level of Understanding: Teach back education performed, Verbalized, Demonstrated       Time Calculation:   Start Time: 1115  Timed Charges  92663 - PT Therapeutic Exercise Minutes: 30  37010 - PT Manual Therapy Minutes: 10  Total Minutes  Timed Charges Total Minutes: 40   Total Minutes: 40  Therapy Charges for Today     Code Description Service Date Service Provider Modifiers Qty    30249472282  PT THER PROC EA 15 MIN 9/15/2022 Vielka Young, PT GP 2    [id number]  PT MANUAL THERAPY EA 15 MIN 9/15/2022 Vielka Young, PT GP 1                    Vielka Young, PT  9/15/2022

## 2022-11-07 ENCOUNTER — OFFICE VISIT (OUTPATIENT)
Dept: INTERNAL MEDICINE | Facility: CLINIC | Age: 77
End: 2022-11-07

## 2022-11-07 ENCOUNTER — TELEPHONE (OUTPATIENT)
Dept: INTERNAL MEDICINE | Facility: CLINIC | Age: 77
End: 2022-11-07

## 2022-11-07 VITALS
TEMPERATURE: 98.4 F | HEART RATE: 68 BPM | SYSTOLIC BLOOD PRESSURE: 122 MMHG | BODY MASS INDEX: 26.82 KG/M2 | WEIGHT: 191.6 LBS | DIASTOLIC BLOOD PRESSURE: 74 MMHG | HEIGHT: 71 IN

## 2022-11-07 DIAGNOSIS — J98.8 RESPIRATORY INFECTION: Primary | ICD-10-CM

## 2022-11-07 LAB
EXPIRATION DATE: NORMAL
EXPIRATION DATE: NORMAL
FLUAV AG NPH QL: NEGATIVE
FLUBV AG NPH QL: NEGATIVE
INTERNAL CONTROL: NORMAL
INTERNAL CONTROL: NORMAL
Lab: NORMAL
Lab: NORMAL
SARS-COV-2 AG UPPER RESP QL IA.RAPID: NOT DETECTED

## 2022-11-07 PROCEDURE — 99213 OFFICE O/P EST LOW 20 MIN: CPT | Performed by: STUDENT IN AN ORGANIZED HEALTH CARE EDUCATION/TRAINING PROGRAM

## 2022-11-07 PROCEDURE — 87804 INFLUENZA ASSAY W/OPTIC: CPT | Performed by: STUDENT IN AN ORGANIZED HEALTH CARE EDUCATION/TRAINING PROGRAM

## 2022-11-07 PROCEDURE — 87426 SARSCOV CORONAVIRUS AG IA: CPT | Performed by: STUDENT IN AN ORGANIZED HEALTH CARE EDUCATION/TRAINING PROGRAM

## 2022-11-07 NOTE — TELEPHONE ENCOUNTER
Caller: Andrea Rosales Jr.    Relationship: Self    Best call back number: [phone]    What medication are you requesting: ANTI-BIOTIC    What are your current symptoms: COUGH     How long have you been experiencing symptoms: 11/3/22    Have you had these symptoms before:    [x] Yes  [] No    Have you been treated for these symptoms before:   [x] Yes  [] No    If a prescription is needed, what is your preferred pharmacy and phone number: [id number] - [address] AT [facility] - [phone]  - [phone]      Additional notes:

## 2022-11-07 NOTE — PROGRESS NOTES
"Chief Complaint  Andrea Rosales Jr. is a 77 y.o. male presenting for Cough (Onset Sat) and URI.     Patient has a past medical history of hypertension, hyperlipidemia, prediabetes, prostate cancer and osteoarthritis.    History of Present Illness  Patient is here due to concern for respiratory infection.  This started on Thursday, 11/3/2022 with scratchy throat, after 1-2 days cough.  His wife also symptomatic, son had bronchitis.  No congestion.  No body aches, fever or chills.  No shortness of breath or wheezing.  Patient often has respiratory infection about once yearly.  He did cough a lot last night and this morning, has been taking DayQuil and he feels symptoms have improved throughout the day.  No shortness of breath, no wheezing.    The following portions of the patient's history were reviewed and updated as appropriate: allergies, current medications, past family history, past medical history, past social history, past surgical history and problem list.    Objective  /74 (BP Location: Left arm, Patient Position: Sitting, Cuff Size: Large Adult)   Pulse 68   Temp 98.4 °F (36.9 °C)   Ht 181.5 cm (71.46\")   Wt 86.9 kg (191 lb 9.6 oz)   BMI 26.38 kg/m²     Physical Exam  Vitals reviewed.   Constitutional:       Appearance: Normal appearance.   HENT:      Head: Normocephalic and atraumatic.      Right Ear: Tympanic membrane, ear canal and external ear normal. There is no impacted cerumen.      Left Ear: Tympanic membrane, ear canal and external ear normal. There is no impacted cerumen.      Nose: No congestion.      Mouth/Throat:      Mouth: Mucous membranes are moist.      Pharynx: Oropharynx is clear. No posterior oropharyngeal erythema.   Eyes:      Extraocular Movements: Extraocular movements intact.      Conjunctiva/sclera: Conjunctivae normal.   Cardiovascular:      Rate and Rhythm: Normal rate and regular rhythm.      Heart sounds: Normal heart sounds. No murmur heard.  Pulmonary:      " Effort: Pulmonary effort is normal.      Breath sounds: Normal breath sounds.   Abdominal:      Palpations: Abdomen is soft.   Musculoskeletal:         General: No swelling.      Cervical back: Neck supple.   Skin:     General: Skin is warm and dry.   Neurological:      Mental Status: He is alert and oriented to person, place, and time. Mental status is at baseline.   Psychiatric:         Behavior: Behavior normal.         Thought Content: Thought content normal.         Assessment/Plan   1. Respiratory infection  COVID and flu negative.  Sick contacts and symptoms point towards likely viral respiratory infection.  He is well-appearing.  Recommend continuing on NyQuil for cough.  He does not feel the need for anything stronger for cough at the moment.  Recommend continuing to monitor without antibiotics, but if any worsening symptoms he should get back in touch.  - POCT ANGELA SARS-CoV-2 Antigen [place]  - POC Influenza A / B      Return if symptoms worsen or fail to improve, for Next scheduled follow up.    Future Appointments       Provider Department Center    3/24/2023 3:45 PM Dann Max MD [facility]IOLOGY [facility] [place]    8/14/2023 10:30 AM Alberto Denis MD [facility] INTERNAL MEDICINE [place]          Raghu Calloway MD  Family Medicine  11/07/2022

## 2022-12-23 ENCOUNTER — DOCUMENTATION (OUTPATIENT)
Dept: PHYSICAL THERAPY | Facility: HOSPITAL | Age: 77
End: 2022-12-23

## 2022-12-23 DIAGNOSIS — G89.29 CHRONIC LEFT SHOULDER PAIN: Primary | ICD-10-CM

## 2022-12-23 DIAGNOSIS — M25.512 CHRONIC LEFT SHOULDER PAIN: Primary | ICD-10-CM

## 2022-12-23 NOTE — THERAPY DISCHARGE NOTE
Outpatient Physical Therapy Discharge Summary         Patient Name: Andrea Rosales Jr.  : 1945  MRN: [id number]    Today's Date: 2022    Visit Dx:    ICD-10-CM ICD-9-CM   1. Chronic left shoulder pain  M25.512 719.41    G89.29 338.29        PT OP Goals     Row Name 22 0900          PT Short Term Goals    STG Date to Achieve 22  -LF     STG 1 Patient to demonstrate painfree L shoulder ROM WNL's as compared to R  -LF     STG 1 Progress Partially Met  -LF        Long Term Goals    LTG Date to Achieve 10/07/22  -LF     LTG 1 Patient indepent with HEP for management of symptoms.  -LF     LTG 1 Progress Met  -LF     LTG 2 Patient to report 1 on QDash for washing back  -LF     LTG 2 Progress Comments deferred, did not complete follow-up QDash  -LF           User Key  (r) = Recorded By, (t) = Taken By, (c) = Cosigned By    Initials Name Provider Type    Vielka Gorman, PT Physical Therapist                OP PT Discharge Summary  Date of Discharge: 10/17/22  Reason for Discharge: Patient/Caregiver request (symptoms improving and managing with HEP)  Discharge Instructions/Additional Comments: Mr. Rosales felt he was doing well overall at his last visit on 9/15/22 and planned to continue with HEP  He was to call for follow-up if lacked continued improvement, but no other appointments scheduled at this time and he is now discharged from this episode of care.                  Vielka Young, PT  2022

## 2023-04-27 NOTE — PROGRESS NOTES
[facility] Cardiology  Office Progress Note  Andrea Rosales Jr.  1945  [address]  [place] [address]       Visit Date: 04/28/23    PCP: Alberto Denis MD  [address]  [place] [address]    IDENTIFICATION: A 77 y.o. male orig from [place] retired  from [employer] at [employer]. Does financial consulting part time now.    PROBLEM LIST:   1. History of chest wall pain  2. Hypertension.    1. 6/16 SE 10 min wnl   3. Hyperlipidemia.    1. 7/21 102/71/41/46  2. 2/22 102/64/41/47  3. 8/22 97/86/39/41  4. Palpitations  1. 2016 holter 1.7% pac  5. Carotid disease  1. 5/30/18 C US 0-49% stenosis bilaterally  6. Prediabetes  1. 2/22 A1c 5.5  2. 8/22 A1c 5.5  7. Osteoarthritis.    8. Prostate cancer status post prostatectomy.    9. Diverticulosis         CC:   Chief Complaint   Patient presents with   • Palpitations       Allergies  No Known Allergies    Current Medications    Current Outpatient Medications:   •  aspirin 81 MG EC tablet, Take 1 tablet by mouth Daily., Disp: , Rfl:   •  atorvastatin (LIPITOR) 40 MG tablet, Take 1 tablet by mouth Daily., Disp: 90 tablet, Rfl: 3  •  Multiple Vitamin (MULTI-VITAMIN DAILY PO), Take 1 tablet by mouth Daily., Disp: , Rfl:   •  nebivolol (BYSTOLIC) 5 MG tablet, TAKE 1 TABLET BY MOUTH EVERY DAY, Disp: 90 tablet, Rfl: 3  •  valsartan (DIOVAN) 40 MG tablet, TAKE 1 TABLET BY MOUTH DAILY (Patient taking differently: Take 1 tablet by mouth 1 (One) Time Per Week.), Disp: 90 tablet, Rfl: 3      History of Present Illness   Andrea Rosales Jr. is a 77 y.o. year old male here for follow up on palpitations and cardiac risk factor management.    Pt denies any chest pain, dyspnea, dyspnea on exertion, orthopnea, PND, palpitations, lower extremity edema, or claudication.  Playing tennis on occasion without issue     OBJECTIVE:  Vitals:    04/28/23 1413   BP: 115/52   BP Location: Right arm   Patient Position: Sitting   Cuff Size:  "Adult   Pulse: 68   SpO2: 99%   Weight: 84.4 kg (186 lb)   Height: 181.6 cm (71.5\")     Body mass index is 25.58 kg/m².    Constitutional:       Appearance: Healthy appearance. Not in distress.   Neck:      Vascular: No JVR. JVD normal.   Pulmonary:      Effort: Pulmonary effort is normal.      Breath sounds: Normal breath sounds. No wheezing. No rhonchi. No rales.   Chest:      Chest wall: Not tender to palpatation.   Cardiovascular:      PMI at left midclavicular line. Normal rate. Regular rhythm. Normal S1. Normal S2.      Murmurs: There is no murmur.      No gallop. No click. No rub.   Pulses:     Intact distal pulses.   Edema:     Peripheral edema absent.   Abdominal:      General: Bowel sounds are normal.      Palpations: Abdomen is soft.      Tenderness: There is no abdominal tenderness.   Musculoskeletal: Normal range of motion.         General: No tenderness. Skin:     General: Skin is warm and dry.   Neurological:      General: No focal deficit present.      Mental Status: Alert and oriented to person, place and time.         Diagnostic Data:  Procedures    Advance Care Planning          ASSESSMENT:   Diagnosis Plan   1. Primary hypertension        2. Mixed hyperlipidemia            PLAN:  Hypertension controlled current valsartan nebivolol    Mixed dyslipidemia controlled on statin therapy          Dann Max MD, FACC  "

## 2023-04-28 ENCOUNTER — OFFICE VISIT (OUTPATIENT)
Dept: CARDIOLOGY | Facility: CLINIC | Age: 78
End: 2023-04-28
Payer: MEDICARE

## 2023-04-28 VITALS
HEIGHT: 72 IN | OXYGEN SATURATION: 99 % | HEART RATE: 68 BPM | SYSTOLIC BLOOD PRESSURE: 115 MMHG | DIASTOLIC BLOOD PRESSURE: 52 MMHG | WEIGHT: 186 LBS | BODY MASS INDEX: 25.19 KG/M2

## 2023-04-28 DIAGNOSIS — E78.2 MIXED HYPERLIPIDEMIA: ICD-10-CM

## 2023-04-28 DIAGNOSIS — I10 PRIMARY HYPERTENSION: Primary | ICD-10-CM

## 2023-04-28 PROCEDURE — 3074F SYST BP LT 130 MM HG: CPT | Performed by: INTERNAL MEDICINE

## 2023-04-28 PROCEDURE — 99213 OFFICE O/P EST LOW 20 MIN: CPT | Performed by: INTERNAL MEDICINE

## 2023-04-28 PROCEDURE — 3078F DIAST BP <80 MM HG: CPT | Performed by: INTERNAL MEDICINE

## 2023-06-14 RX ORDER — ATORVASTATIN CALCIUM 40 MG/1
40 TABLET, FILM COATED ORAL DAILY
Qty: 90 TABLET | Refills: 3 | Status: SHIPPED | OUTPATIENT
Start: 2023-06-14

## 2023-08-14 ENCOUNTER — LAB (OUTPATIENT)
Dept: LAB | Facility: HOSPITAL | Age: 78
End: 2023-08-14
Payer: MEDICARE

## 2023-08-14 ENCOUNTER — OFFICE VISIT (OUTPATIENT)
Dept: INTERNAL MEDICINE | Facility: CLINIC | Age: 78
End: 2023-08-14
Payer: MEDICARE

## 2023-08-14 VITALS
WEIGHT: 190.8 LBS | DIASTOLIC BLOOD PRESSURE: 62 MMHG | HEART RATE: 68 BPM | SYSTOLIC BLOOD PRESSURE: 116 MMHG | HEIGHT: 72 IN | TEMPERATURE: 98.4 F | BODY MASS INDEX: 25.84 KG/M2

## 2023-08-14 DIAGNOSIS — I10 PRIMARY HYPERTENSION: ICD-10-CM

## 2023-08-14 DIAGNOSIS — Z00.00 PREVENTATIVE HEALTH CARE: Primary | ICD-10-CM

## 2023-08-14 DIAGNOSIS — R73.03 PREDIABETES: ICD-10-CM

## 2023-08-14 DIAGNOSIS — Z85.46 HISTORY OF PROSTATE CANCER: ICD-10-CM

## 2023-08-14 DIAGNOSIS — E78.2 MIXED HYPERLIPIDEMIA: ICD-10-CM

## 2023-08-14 NOTE — PROGRESS NOTES
QUICK REFERENCE INFORMATION:  The ABCs of the Annual Wellness Visit    Subsequent Medicare Wellness Visit    HEALTH RISK ASSESSMENT    1945    Recent Hospitalizations:  No hospitalization(s) within the last year..        Current Medical Providers:  Patient Care Team:  Alberto Denis MD as PCP - General  Alberto Denis MD as PCP - Family Medicine  Dann Max MD as Consulting Physician (Cardiology)        Smoking Status:  Social History     Tobacco Use   Smoking Status Never   Smokeless Tobacco Never       Alcohol Consumption:  Social History     Substance and Sexual Activity   Alcohol Use Yes    Comment: Only  drink alcoholic beverages occasionally       Depression Screen:       2023    10:49 AM   PHQ-2/PHQ-9 Depression Screening   Little Interest or Pleasure in Doing Things 0-->not at all   Feeling Down, Depressed or Hopeless 0-->not at all   PHQ-9: Brief Depression Severity Measure Score 0       Health Habits and Functional and Cognitive Screenin/14/2023    10:45 AM   Functional & Cognitive Status   Do you have difficulty preparing food and eating? No   Do you have difficulty bathing yourself, getting dressed or grooming yourself? No   Do you have difficulty using the toilet? No   Do you have difficulty moving around from place to place? No   Do you have trouble with steps or getting out of a bed or a chair? No   Current Diet Well Balanced Diet   Dental Exam Up to date   Eye Exam Up to date   Exercise (times per week) 6 times per week   Current Exercises Include Tennis;Walking   Do you need help using the phone?  No   Are you deaf or do you have serious difficulty hearing?  Yes   Do you need help to go to places out of walking distance? No   Do you need help shopping? No   Do you need help preparing meals?  No   Do you need help with housework?  No   Do you need help with laundry? No   Do you need help taking your medications? No   Do you need help managing money? No   Do  you ever drive or ride in a car without wearing a seat belt? No   Have you felt unusual stress, anger or loneliness in the last month? No   Who do you live with? Spouse   If you need help, do you have trouble finding someone available to you? No   Have you been bothered in the last four weeks by sexual problems? No   Do you have difficulty concentrating, remembering or making decisions? No       Fall Risk Screen:  RADHA Fall Risk Assessment was completed, and patient is at LOW risk for falls.Assessment completed on:8/14/2023    ACE III MINI        Does the patient have evidence of cognitive impairment? No    Aspirin use counseling: Taking ASA appropriately as indicated    Recent Lab Results:  CMP:  Lab Results   Component Value Date    BUN 19 08/09/2022    CREATININE 0.87 08/09/2022    EGFRIFNONA 62 02/01/2022    EGFRIFAFRI 72 02/01/2022    BCR 21.8 08/09/2022     08/09/2022    K 4.3 08/09/2022    CO2 22.9 08/09/2022    CALCIUM 9.8 08/09/2022    PROTENTOTREF 7.0 08/09/2022    ALBUMIN 4.70 08/09/2022    LABGLOBREF 2.3 08/09/2022    LABIL2 2.0 08/09/2022    BILITOT 0.8 08/09/2022    ALKPHOS 78 08/09/2022    AST 36 08/09/2022    ALT 43 (H) 08/09/2022     HbA1c:  Lab Results   Component Value Date    HGBA1C 5.50 08/09/2022    HGBA1C 5.5 02/01/2022     Microalbumin:  Lab Results   Component Value Date    MICROALBUR 6.5 08/09/2022     Lipid Panel  Lab Results   Component Value Date    CHOL 120 01/10/2019    TRIG 86 08/09/2022    HDL 39 (L) 08/09/2022    LDL 41 08/09/2022    AST 36 08/09/2022    ALT 43 (H) 08/09/2022       Visual Acuity:  No results found.    Age-appropriate Screening Schedule:  Refer to the list below for future screening recommendations based on patient's age, sex and/or medical conditions. Orders for these recommended tests are listed in the plan section. The patient has been provided with a written plan.    Health Maintenance   Topic Date Due    ZOSTER VACCINE (1 of 2) Never done    COVID-19  Vaccine (6 - Pfizer series) 01/28/2023    ANNUAL WELLNESS VISIT  08/09/2023    LIPID PANEL  08/09/2023    INFLUENZA VACCINE  10/01/2023    COLORECTAL CANCER SCREENING  09/10/2024    TDAP/TD VACCINES (2 - Td or Tdap) 03/16/2032    HEPATITIS C SCREENING  Completed    Pneumococcal Vaccine 65+  Completed        Subjective   History of Present Illness    Andrea Rosales Jr. is a 77 y.o. male who presents for a Subsequent Wellness Visit and for follow-up of hypertension, hyperlipidemia, prediabetes, and history of prostate cancer.    Hypertension  The patient is doing well physically. His blood pressure is within normal limits today. He denies any shortness of breath or chest pain.    Left calf pain  The patient woke up approximately 8 to 9 days ago with a hard knot on the back of his left calf area. He sat on the side of the bed, rubbed the area, and it went away. He denies any pain but caused him to limp. This has happened about 3 times over a period of 5 years. He admits that when it returns, it is achy and lingers for approximately 7 to 8 days. He reports that walking and playing tennis helps relieve the pain. He denies edema in his lower extremities bilaterally.     Vision  The patient complains of issues with his vision. He has seen Dr. Kameron Puentes and has now tried 3 pairs of contacts that do help improve his vision issues.     Overall  The patient admits that his strength and stamina are well. He denies any shortness of breath.       CHRONIC CONDITIONS    The following portions of the patient's history were reviewed and updated as appropriate: allergies, current medications, past family history, past medical history, past social history, past surgical history, and problem list.    Outpatient Medications Prior to Visit   Medication Sig Dispense Refill    aspirin 81 MG EC tablet Take 1 tablet by mouth Daily.      atorvastatin (LIPITOR) 40 MG tablet Take 1 tablet by mouth Daily. 90 tablet 3    Multiple Vitamin  (MULTI-VITAMIN DAILY PO) Take 1 tablet by mouth Daily.      nebivolol (BYSTOLIC) 5 MG tablet TAKE 1 TABLET BY MOUTH EVERY DAY 90 tablet 3    valsartan (DIOVAN) 40 MG tablet TAKE 1 TABLET BY MOUTH DAILY (Patient taking differently: Take 1 tablet by mouth 1 (One) Time Per Week.) 90 tablet 3     No facility-administered medications prior to visit.       Patient Active Problem List   Diagnosis    Hyperlipidemia    Osteoarthritis    Palpitations    Prediabetes    Hypercholesterolemia    Primary hypertension    History of prostate cancer    Annual physical exam    Premature atrial contraction    Localized, secondary osteoarthritis of the shoulder region, right       Advance Care Planning:  ACP discussion was held with the patient during this visit. Patient has an advance directive in EMR which is still valid.     Identification of Risk Factors:  Risk factors include: Advance Directive Discussion.    Review of Systems   Constitutional:  Negative for chills, fatigue and fever.   HENT:  Negative for congestion, ear pain and sinus pressure.    Respiratory:  Negative for cough, chest tightness, shortness of breath and wheezing.    Cardiovascular:  Negative for chest pain and palpitations.   Gastrointestinal:  Negative for abdominal pain, blood in stool and constipation.   Skin:  Negative for color change.   Allergic/Immunologic: Negative for environmental allergies.   Neurological:  Negative for dizziness, speech difficulty and headaches.   Psychiatric/Behavioral:  Negative for confusion. The patient is not nervous/anxious.      Compared to one year ago, the patient feels his physical health is the same.  Compared to one year ago, the patient feels his mental health is the same.    Objective     Physical Exam  Vitals and nursing note reviewed.   Constitutional:       General: He is not in acute distress.     Appearance: He is well-developed. He is not diaphoretic.      Comments: NAD. A&Ox3.   HENT:      Head: Normocephalic and  atraumatic.      Right Ear: External ear normal.      Left Ear: External ear normal.      Mouth/Throat:      Pharynx: No oropharyngeal exudate.   Eyes:      General: No scleral icterus.     Conjunctiva/sclera: Conjunctivae normal.      Pupils: Pupils are equal, round, and reactive to light.   Neck:      Thyroid: No thyromegaly.      Vascular: No carotid bruit.   Cardiovascular:      Rate and Rhythm: Normal rate and regular rhythm.      Heart sounds: Normal heart sounds, S1 normal and S2 normal. No murmur heard.    No friction rub. No gallop. No S3 or S4 sounds.      Comments: No thrills. Pulses 2+/4 B/L in UE and LE.   Pulmonary:      Effort: Pulmonary effort is normal. No respiratory distress.      Breath sounds: Normal breath sounds. No wheezing, rhonchi or rales.   Chest:      Chest wall: No tenderness.   Abdominal:      General: Bowel sounds are normal. There is no distension or abdominal bruit.      Palpations: Abdomen is soft. There is no mass.      Tenderness: There is no abdominal tenderness.      Hernia: No hernia is present.      Comments: Nondistended, nontender. (+)BSx4.    Musculoskeletal:         General: No deformity. Normal range of motion.      Cervical back: Full passive range of motion without pain and neck supple.      Comments: FROMx4, equal B/L.    Lymphadenopathy:      Cervical: No cervical adenopathy.   Skin:     General: Skin is warm and dry.      Findings: No erythema or rash.      Comments: Scattered seborrheic keratoses. No suspicious lesions.   Neurological:      Mental Status: He is alert and oriented to person, place, and time.      Cranial Nerves: No cranial nerve deficit.      Sensory: No sensory deficit.      Motor: No tremor, atrophy or abnormal muscle tone.      Coordination: Coordination normal.      Gait: Gait normal.      Deep Tendon Reflexes: Reflexes are normal and symmetric.   Psychiatric:         Behavior: Behavior normal.         Thought Content: Thought content normal.  "Thought content does not include homicidal or suicidal ideation. Thought content does not include homicidal or suicidal plan.         Judgment: Judgment normal.        Procedures     Vitals:    08/14/23 1050   BP: 116/62   BP Location: Left arm   Patient Position: Sitting   Cuff Size: Adult   Pulse: 68   Temp: 98.4 øF (36.9 øC)   Weight: 86.5 kg (190 lb 12.8 oz)   Height: 182 cm (71.65\")   PainSc:   2   PainLoc: Leg  Comment: lower leg, left worse       BMI is >= 25 and <30. (Overweight) The following options were offered after discussion;: exercise counseling/recommendations and nutrition counseling/recommendations      Assessment & Plan   Problem List Items Addressed This Visit          Cardiac and Vasculature    Hyperlipidemia    Relevant Medications    atorvastatin (LIPITOR) 40 MG tablet    Other Relevant Orders    Comprehensive Metabolic Panel    Lipid Panel    Primary hypertension    Relevant Medications    nebivolol (BYSTOLIC) 5 MG tablet    Other Relevant Orders    CBC & Differential    Comprehensive Metabolic Panel    Microalbumin / Creatinine Urine Ratio - Urine, Clean Catch       Endocrine and Metabolic    Prediabetes    Relevant Orders    Hemoglobin A1c       Hematology and Neoplasia    History of prostate cancer     Other Visit Diagnoses       Preventative health care    -  Primary        1. Prevention  Overall, he remains in very good health with good lifestyle habits. Colonoscopy is due next year. He is fully vaccinated against COVID-19.    2. Hypertension  Blood pressure is controlled on nebivolol alone.    3. Prediabetes  A1c is pending. The treatment remains healthy diet and avoidance of weight gain.    4. History of prostate cancer in remission  He will follow up with his urologist.    5. Hyperlipidemia  Lipid panel is pending. Continue atorvastatin, but if his myalgias do not improve with hydration, would consider holding his statin for a few weeks to see if it gets better.    Follow up in 1 " year.    Patient Self-Management and Personalized Health Advice  The patient has been provided with information about: diet and exercise and preventive services including:   Annual Wellness Visit (AWV).    Outpatient Encounter Medications as of 8/14/2023   Medication Sig Dispense Refill    aspirin 81 MG EC tablet Take 1 tablet by mouth Daily.      atorvastatin (LIPITOR) 40 MG tablet Take 1 tablet by mouth Daily. 90 tablet 3    Multiple Vitamin (MULTI-VITAMIN DAILY PO) Take 1 tablet by mouth Daily.      nebivolol (BYSTOLIC) 5 MG tablet TAKE 1 TABLET BY MOUTH EVERY DAY 90 tablet 3    [DISCONTINUED] valsartan (DIOVAN) 40 MG tablet TAKE 1 TABLET BY MOUTH DAILY (Patient taking differently: Take 1 tablet by mouth 1 (One) Time Per Week.) 90 tablet 3     No facility-administered encounter medications on file as of 8/14/2023.       Reviewed use of high risk medication in the elderly: yes  Reviewed for potential of harmful drug interactions in the elderly: yes    Follow Up:  Return in about 1 year (around 8/14/2024) for Medicare Wellness.     There are no Patient Instructions on file for this visit.    An After Visit Summary and PPPS with all of these plans were given to the patient.       Transcribed from ambient dictation for Alberto Denis MD by Rosy Palomino.  08/14/23   13:28 EDT    Patient or patient representative verbalized consent to the visit recording.  I have personally performed the services described in this document as transcribed by the above individual, and it is both accurate and complete.

## 2023-08-15 LAB
ALBUMIN SERPL-MCNC: 4.7 G/DL (ref 3.5–5.2)
ALBUMIN/CREAT UR: 4 MG/G CREAT (ref 0–29)
ALBUMIN/GLOB SERPL: 2 G/DL
ALP SERPL-CCNC: 93 U/L (ref 39–117)
ALT SERPL-CCNC: 47 U/L (ref 1–41)
AST SERPL-CCNC: 36 U/L (ref 1–40)
BASOPHILS # BLD AUTO: 0.05 10*3/MM3 (ref 0–0.2)
BASOPHILS NFR BLD AUTO: 0.6 % (ref 0–1.5)
BILIRUB SERPL-MCNC: 0.6 MG/DL (ref 0–1.2)
BUN SERPL-MCNC: 17 MG/DL (ref 8–23)
BUN/CREAT SERPL: 17.2 (ref 7–25)
CALCIUM SERPL-MCNC: 10.1 MG/DL (ref 8.6–10.5)
CHLORIDE SERPL-SCNC: 105 MMOL/L (ref 98–107)
CHOLEST SERPL-MCNC: 106 MG/DL (ref 0–200)
CO2 SERPL-SCNC: 27.7 MMOL/L (ref 22–29)
CREAT SERPL-MCNC: 0.99 MG/DL (ref 0.76–1.27)
CREAT UR-MCNC: 223.1 MG/DL
EGFRCR SERPLBLD CKD-EPI 2021: 78 ML/MIN/1.73
EOSINOPHIL # BLD AUTO: 0.77 10*3/MM3 (ref 0–0.4)
EOSINOPHIL NFR BLD AUTO: 8.9 % (ref 0.3–6.2)
ERYTHROCYTE [DISTWIDTH] IN BLOOD BY AUTOMATED COUNT: 13.1 % (ref 12.3–15.4)
GLOBULIN SER CALC-MCNC: 2.4 GM/DL
GLUCOSE SERPL-MCNC: 107 MG/DL (ref 65–99)
HBA1C MFR BLD: 5.7 % (ref 4.8–5.6)
HCT VFR BLD AUTO: 46.9 % (ref 37.5–51)
HDLC SERPL-MCNC: 40 MG/DL (ref 40–60)
HGB BLD-MCNC: 16.3 G/DL (ref 13–17.7)
IMM GRANULOCYTES # BLD AUTO: 0.02 10*3/MM3 (ref 0–0.05)
IMM GRANULOCYTES NFR BLD AUTO: 0.2 % (ref 0–0.5)
LDLC SERPL CALC-MCNC: 48 MG/DL (ref 0–100)
LYMPHOCYTES # BLD AUTO: 2.29 10*3/MM3 (ref 0.7–3.1)
LYMPHOCYTES NFR BLD AUTO: 26.5 % (ref 19.6–45.3)
MCH RBC QN AUTO: 31.8 PG (ref 26.6–33)
MCHC RBC AUTO-ENTMCNC: 34.8 G/DL (ref 31.5–35.7)
MCV RBC AUTO: 91.4 FL (ref 79–97)
MICROALBUMIN UR-MCNC: 8.8 UG/ML
MONOCYTES # BLD AUTO: 0.78 10*3/MM3 (ref 0.1–0.9)
MONOCYTES NFR BLD AUTO: 9 % (ref 5–12)
NEUTROPHILS # BLD AUTO: 4.74 10*3/MM3 (ref 1.7–7)
NEUTROPHILS NFR BLD AUTO: 54.8 % (ref 42.7–76)
NRBC BLD AUTO-RTO: 0 /100 WBC (ref 0–0.2)
PLATELET # BLD AUTO: 144 10*3/MM3 (ref 140–450)
POTASSIUM SERPL-SCNC: 4.5 MMOL/L (ref 3.5–5.2)
PROT SERPL-MCNC: 7.1 G/DL (ref 6–8.5)
RBC # BLD AUTO: 5.13 10*6/MM3 (ref 4.14–5.8)
SODIUM SERPL-SCNC: 142 MMOL/L (ref 136–145)
TRIGL SERPL-MCNC: 93 MG/DL (ref 0–150)
VLDLC SERPL CALC-MCNC: 18 MG/DL (ref 5–40)
WBC # BLD AUTO: 8.65 10*3/MM3 (ref 3.4–10.8)

## 2023-09-12 RX ORDER — NEBIVOLOL 5 MG/1
5 TABLET ORAL DAILY
Qty: 90 TABLET | Refills: 3 | Status: SHIPPED | OUTPATIENT
Start: 2023-09-12

## 2023-11-30 ENCOUNTER — TELEPHONE (OUTPATIENT)
Dept: INTERNAL MEDICINE | Facility: CLINIC | Age: 78
End: 2023-11-30

## 2023-11-30 ENCOUNTER — OFFICE VISIT (OUTPATIENT)
Dept: INTERNAL MEDICINE | Facility: CLINIC | Age: 78
End: 2023-11-30
Payer: MEDICARE

## 2023-11-30 VITALS
HEIGHT: 72 IN | HEART RATE: 72 BPM | WEIGHT: 189.2 LBS | DIASTOLIC BLOOD PRESSURE: 68 MMHG | BODY MASS INDEX: 25.63 KG/M2 | SYSTOLIC BLOOD PRESSURE: 98 MMHG | TEMPERATURE: 98.7 F

## 2023-11-30 DIAGNOSIS — J06.9 VIRAL UPPER RESPIRATORY TRACT INFECTION: Primary | ICD-10-CM

## 2023-11-30 DIAGNOSIS — J02.9 SORE THROAT: ICD-10-CM

## 2023-11-30 LAB
EXPIRATION DATE: NORMAL
EXPIRATION DATE: NORMAL
INTERNAL CONTROL: NORMAL
INTERNAL CONTROL: NORMAL
Lab: NORMAL
Lab: NORMAL
RSV AG SPEC QL: NEGATIVE
S PYO AG THROAT QL: NEGATIVE

## 2023-11-30 PROCEDURE — 99213 OFFICE O/P EST LOW 20 MIN: CPT | Performed by: PHYSICIAN ASSISTANT

## 2023-11-30 PROCEDURE — 3074F SYST BP LT 130 MM HG: CPT | Performed by: PHYSICIAN ASSISTANT

## 2023-11-30 PROCEDURE — 87880 STREP A ASSAY W/OPTIC: CPT | Performed by: PHYSICIAN ASSISTANT

## 2023-11-30 PROCEDURE — 87807 RSV ASSAY W/OPTIC: CPT | Performed by: PHYSICIAN ASSISTANT

## 2023-11-30 PROCEDURE — 3078F DIAST BP <80 MM HG: CPT | Performed by: PHYSICIAN ASSISTANT

## 2023-11-30 RX ORDER — AMOXICILLIN AND CLAVULANATE POTASSIUM 875; 125 MG/1; MG/1
1 TABLET, FILM COATED ORAL 2 TIMES DAILY
Qty: 14 TABLET | Refills: 0 | Status: SHIPPED | OUTPATIENT
Start: 2023-11-30 | End: 2023-12-07

## 2023-11-30 NOTE — TELEPHONE ENCOUNTER
"Caller: Andrea Rosales Jr. \"Bill\"    Relationship: Self    Best call back number: [phone]     What medication are you requesting: ANTIBIOTIC    What are your current symptoms: SCRATCHY THROAT, COUGH, FEVERISH    How long have you been experiencing symptoms: 1128/23    Have you had these symptoms before:    [x] Yes  [] No    Have you been treated for these symptoms before:   [x] Yes  [] No    If a prescription is needed, what is your preferred pharmacy and phone number: [id number] - [address] AT [facility] & [place] [address] - [phone]  - [phone] FX     Additional notes: PATIENT WOULD LIKE A CALL WHEN THE MEDICATION HAS BEEN SENT. HE IS AVAILABLE FOR AN APPOINTMENT IF NEEDED.      "

## 2023-11-30 NOTE — TELEPHONE ENCOUNTER
See if someone can see him this afternoon.  Let him know most of what we are seeing is viral, including covid.

## 2023-11-30 NOTE — PROGRESS NOTES
[facility] Internal Medicine    Andrea Rosales Jr.  1945   [id number]      Patient Care Team:  Alberto Denis MD as PCP - General  Alberto Denis MD as PCP - Family Medicine  Dann Max MD as Consulting Physician (Cardiology)    Chief Complaint::   Chief Complaint   Patient presents with    scratchy throat     Cough when it is scratchy        HPI  Erick is a 78 year old male who presents today for evaluation of sore throat and cough.  Symptoms started several days ago.  He has been taking Dayquil and Nyquil, with some improvement,  Has been around young grandchildren, who were ill. He has not felt that he had a fever.  He had COVID booster.        Patient Active Problem List   Diagnosis    Hyperlipidemia    Osteoarthritis    Palpitations    Prediabetes    Hypercholesterolemia    Primary hypertension    History of prostate cancer    Annual physical exam    Premature atrial contraction    Localized, secondary osteoarthritis of the shoulder region, right    Viral upper respiratory tract infection    Sore throat        Past Medical History:   Diagnosis Date    Arthritis Shoulder, 2019    Benign essential hypertension     Cataract Forming, 2018/19    Diverticulosis Diverticulosis, 9/2019    GERD (gastroesophageal reflux disease) Appeared to have GERD about 3 years ago    History of colonic polyps     History of prostate cancer     HL (hearing loss) For many years, hearing aid 10,2019, no difference    Hypercholesterolemia     Prediabetes     Prostate cancer     S/P PROSTATECTOMY    Right-sided back pain     Visual impairment Driving at night, outside city       Past Surgical History:   Procedure Laterality Date    COLONOSCOPY  09/2019    PROSTATECTOMY  1998    Prostate cancer-Radical     TONSILLECTOMY         Family History   Problem Relation Age of Onset    Breast cancer Mother     Cancer Mother         Breast cancer, then spread    Stroke Father         Cause of death    Stomach cancer Father  "        Gastric    Alcohol abuse Father         But not out of hand    Cancer Father         Bladder    Breast cancer Sister     Cancer Sister         Breast cancer, then spread       Social History     Socioeconomic History    Marital status:    Tobacco Use    Smoking status: Never    Smokeless tobacco: Never   Vaping Use    Vaping Use: Never used   Substance and Sexual Activity    Alcohol use: Yes     Comment: Only  drink alcoholic beverages occasionally    Drug use: No    Sexual activity: Not Currently     Partners: Female     Birth control/protection: Other     Comment: Prostate extracted       No Known Allergies    Review of Systems   Constitutional:  Negative for fatigue and fever.   HENT:  Positive for congestion, sore throat, swollen glands and voice change.    Eyes:  Negative for blurred vision and double vision.   Respiratory:  Positive for cough.    Endocrine: Negative for cold intolerance.   Allergic/Immunologic: Positive for environmental allergies.        Vital Signs  Vitals:    11/30/23 1431   BP: 98/68   BP Location: Left arm   Patient Position: Sitting   Cuff Size: Adult   Pulse: 72   Temp: 98.7 °F (37.1 °C)   TempSrc: Temporal   Weight: 85.8 kg (189 lb 3.2 oz)   Height: 182 cm (71.65\")     Body mass index is 25.91 kg/m².        Advance Care Planning   ACP discussion was held with the patient during this visit. Patient does not have an advance directive, information provided.       Current Outpatient Medications:     aspirin 81 MG EC tablet, Take 1 tablet by mouth Daily., Disp: , Rfl:     atorvastatin (LIPITOR) 40 MG tablet, Take 1 tablet by mouth Daily., Disp: 90 tablet, Rfl: 3    Multiple Vitamin (MULTI-VITAMIN DAILY PO), Take 1 tablet by mouth Daily., Disp: , Rfl:     nebivolol (BYSTOLIC) 5 MG tablet, Take 1 tablet by mouth Daily., Disp: 90 tablet, Rfl: 3    amoxicillin-clavulanate (AUGMENTIN) 875-125 MG per tablet, Take 1 tablet by mouth 2 (Two) Times a Day for 7 days., Disp: 14 tablet, " Rfl: 0    Physical Exam  Vitals reviewed.   Constitutional:       Appearance: He is ill-appearing.   HENT:      Head: Normocephalic and atraumatic.      Right Ear: Tympanic membrane, ear canal and external ear normal.      Left Ear: Tympanic membrane, ear canal and external ear normal.      Nose: Nose normal.      Mouth/Throat:      Mouth: Mucous membranes are moist.      Pharynx: Oropharynx is clear. Posterior oropharyngeal erythema present.   Eyes:      Conjunctiva/sclera: Conjunctivae normal.      Pupils: Pupils are equal, round, and reactive to light.   Cardiovascular:      Rate and Rhythm: Normal rate and regular rhythm.      Pulses: Normal pulses.      Heart sounds: Normal heart sounds.   Pulmonary:      Effort: Pulmonary effort is normal.      Breath sounds: Normal breath sounds.   Musculoskeletal:      Cervical back: Normal range of motion.   Lymphadenopathy:      Cervical: No cervical adenopathy.   Skin:     General: Skin is warm and dry.   Neurological:      General: No focal deficit present.      Mental Status: He is alert and oriented to person, place, and time.   Psychiatric:         Mood and Affect: Mood normal.         Behavior: Behavior normal.          ACE III MINI            Results Review:    Recent Results (from the past 672 hour(s))   POCT RSV    Collection Time: 11/30/23  3:11 PM    Specimen: Swab   Result Value Ref Range    Respiratory Syncytial Virus Negative     Internal Control Passed Passed    Lot Number [id number]     Expiration Date 11/26/25    POC Rapid Strep A    Collection Time: 11/30/23  3:12 PM    Specimen: Swab   Result Value Ref Range    Rapid Strep A Screen Negative Negative, VALID, INVALID, Not Performed    Internal Control Passed Passed    Lot Number [id number]     Expiration Date 03/30/26      Procedures    Medication Review: Medications reviewed and noted    Social History     Socioeconomic History    Marital status:    Tobacco Use    Smoking status: Never    Smokeless  tobacco: Never   Vaping Use    Vaping Use: Never used   Substance and Sexual Activity    Alcohol use: Yes     Comment: Only  drink alcoholic beverages occasionally    Drug use: No    Sexual activity: Not Currently     Partners: Female     Birth control/protection: Other     Comment: Prostate extracted        Assessment/Plan:    Diagnoses and all orders for this visit:    1. Viral upper respiratory tract infection (Primary)  -     amoxicillin-clavulanate (AUGMENTIN) 875-125 MG per tablet; Take 1 tablet by mouth 2 (Two) Times a Day for 7 days.  Dispense: 14 tablet; Refill: 0    2. Sore throat  -     POC Rapid Strep A  -     POCT RSV       It is likely viral.  I will give him prescription of Augmentin in case symptoms worsen over the weekend.  There are no Patient Instructions on file for this visit.     Plan of care reviewed with patient at   In the meantime, continue drinking plenty of water.  May continue DayQuil and NyQuil.e conclusion of today's visit. Education was provided regarding diagnosis, management, and any prescribed or recommended OTC medications.Patient verbalizes understanding of and agreement with management plan.         I spent 20 minutes caring for Andrea on this date of service. This time includes time spent by me in the following activities:preparing for the visit, reviewing tests, obtaining and/or reviewing a separately obtained history, performing a medically appropriate examination and/or evaluation , counseling and educating the patient/family/caregiver, ordering medications, tests, or procedures, referring and communicating with other health care professionals , and documenting information in the medical record    Sintia Pearson PA-C      Note: Part of this note may be an electronic transcription/translation of spoken language to printed text using the Dragon Dictation system.

## 2023-12-01 PROBLEM — J06.9 VIRAL UPPER RESPIRATORY TRACT INFECTION: Status: ACTIVE | Noted: 2023-12-01

## 2023-12-01 PROBLEM — J02.9 SORE THROAT: Status: ACTIVE | Noted: 2023-12-01

## 2023-12-18 ENCOUNTER — TELEPHONE (OUTPATIENT)
Dept: INTERNAL MEDICINE | Facility: CLINIC | Age: 78
End: 2023-12-18
Payer: MEDICARE

## 2023-12-18 RX ORDER — BENZONATATE 100 MG/1
100 CAPSULE ORAL 3 TIMES DAILY PRN
Qty: 30 CAPSULE | Refills: 1 | Status: SHIPPED | OUTPATIENT
Start: 2023-12-18

## 2023-12-18 NOTE — TELEPHONE ENCOUNTER
"Caller: Andrea Rosales Jr. \"Bill\"    Relationship: Self    Best call back number: [phone]     What medication are you requesting: SOMETHING FOR SYMPTOMS     What are your current symptoms: COUGH     How long have you been experiencing symptoms: ABOUT 12 DAYS     Have you had these symptoms before:    [x] Yes  [] No    Have you been treated for these symptoms before:   [x] Yes  [] No    If a prescription is needed, what is your preferred pharmacy and phone number: [facility] #[id number] - [address] AT [facility] [address] - [phone]  - [phone] FX     Additional notes: PATIENT STATES HE HAS BEEN SEEN FOR THIS AND WAS PRESCRIBED SOME MEDICATION. HE IS NOW OUT OF THAT MEDICATION AND WOULD LIKE TO KNOW IF HE CAN GET THE SAME ONE OR A DIFFERENT MEDICATION TO HELP.         "

## 2023-12-18 NOTE — TELEPHONE ENCOUNTER
Called pt and he said said symptoms did get better, but 2 days after he finished meds, cough returned. It has been going on about 11 days now. No other symptoms but cough

## 2024-03-25 ENCOUNTER — TELEPHONE (OUTPATIENT)
Dept: INTERNAL MEDICINE | Facility: CLINIC | Age: 79
End: 2024-03-25
Payer: MEDICARE

## 2024-03-25 NOTE — TELEPHONE ENCOUNTER
PT STATED HIS KNEE HAS GIVEN OUT ON HIM 6 TO 7 TIMES OVER THE WEEK AND IT ONLY HURTS WHEN THAT HAPPENS. HE WOULD LIKE TO SEE IF HE CAN BE REFERRED TO ORTHOPEDICS.

## 2024-03-26 ENCOUNTER — OFFICE VISIT (OUTPATIENT)
Dept: INTERNAL MEDICINE | Facility: CLINIC | Age: 79
End: 2024-03-26
Payer: MEDICARE

## 2024-03-26 VITALS
BODY MASS INDEX: 25.47 KG/M2 | WEIGHT: 188 LBS | HEART RATE: 76 BPM | HEIGHT: 72 IN | TEMPERATURE: 98.4 F | DIASTOLIC BLOOD PRESSURE: 72 MMHG | SYSTOLIC BLOOD PRESSURE: 122 MMHG

## 2024-03-26 DIAGNOSIS — M25.562 ACUTE PAIN OF LEFT KNEE: Primary | ICD-10-CM

## 2024-03-26 NOTE — PROGRESS NOTES
Acute Office Visit      Name: Andrea Rosales Jr.    : 1945     MRN: [id number]   Care Team: Patient Care Team:  Alberto Denis MD as PCP - General  Alberto Denis MD as PCP - Family Medicine  Dann Max MD as Consulting Physician (Cardiology)    Chief Complaint  Knee Pain (Left, 2-3 times a day it is giving out)    Subjective     History of Present Illness:  Andrea Rosales is a 78-year-old male who presents for evaluation of left knee pain.    Left knee pain.  He played tennis on Tuesday, 2024 which caused him to turn a certain way resulting in left knee pain. Since then, he has had 2-3 episodes of knee instability per day while ambulating. He describes intermittent sharp pain during these episodes. He has difficulty flexing his knee. He denies any issues with his right knee. He denies any recent injury. He has not taken any medication for it or tried ice or heat. He had a meniscus tear in his left knee about 12 years ago but did not have any surgery. He played tennis 3 times a week and has not had any problems for years. He denies any pain currently. He has had periods of rest without playing tennis due to having cataract surgery in both eyes.      Review of Systems:  Positive left knee pain with buckling.    Past Medical History:   Diagnosis Date    Arthritis Shoulder2019    Benign essential hypertension     Cataract Forming,     Diverticulosis Diverticulosis, 2019    GERD (gastroesophageal reflux disease) Appeared to have GERD about 3 years ago    History of colonic polyps     History of prostate cancer     HL (hearing loss) For many years, hearing aid 10,2019, no difference    Hypercholesterolemia     Prediabetes     Prostate cancer     S/P PROSTATECTOMY    Right-sided back pain     Visual impairment Driving at night, outside city       Past Surgical History:   Procedure Laterality Date    COLONOSCOPY  2019    PROSTATECTOMY  1998    Prostate  "cancer-Radical     TONSILLECTOMY         Social History     Socioeconomic History    Marital status:    Tobacco Use    Smoking status: Never    Smokeless tobacco: Never   Vaping Use    Vaping status: Never Used   Substance and Sexual Activity    Alcohol use: Yes     Comment: Only  drink alcoholic beverages occasionally    Drug use: No    Sexual activity: Not Currently     Partners: Female     Birth control/protection: Other     Comment: Prostate extracted         Current Outpatient Medications:     aspirin 81 MG EC tablet, Take 1 tablet by mouth Daily., Disp: , Rfl:     atorvastatin (LIPITOR) 40 MG tablet, Take 1 tablet by mouth Daily., Disp: 90 tablet, Rfl: 3    Multiple Vitamin (MULTI-VITAMIN DAILY PO), Take 1 tablet by mouth Daily., Disp: , Rfl:     nebivolol (BYSTOLIC) 5 MG tablet, Take 1 tablet by mouth Daily., Disp: 90 tablet, Rfl: 3    Procedures    PHQ-9 Total Score:      Objective     Vital Signs  /72   Pulse 76   Temp 98.4 °F (36.9 °C)   Ht 182 cm (71.65\")   Wt 85.3 kg (188 lb)   BMI 25.74 kg/m²   Estimated body mass index is 25.74 kg/m² as calculated from the following:    Height as of this encounter: 182 cm (71.65\").    Weight as of this encounter: 85.3 kg (188 lb).            Physical Exam  Vitals and nursing note reviewed.   HENT:      Head: Normocephalic.   Cardiovascular:      Rate and Rhythm: Normal rate.   Pulmonary:      Effort: Pulmonary effort is normal.      Breath sounds: Normal breath sounds.   Musculoskeletal:         General: No swelling or tenderness. Normal range of motion.   Skin:     General: Skin is warm and dry.   Neurological:      General: No focal deficit present.      Mental Status: He is alert and oriented to person, place, and time.   Psychiatric:         Mood and Affect: Mood normal.         Behavior: Behavior normal.         Thought Content: Thought content normal.         Judgment: Judgment normal.            Assessment and Plan "     Assessment/Plan:  Diagnoses and all orders for this visit:    1. Acute pain of left knee (Primary)  -He was advised to use ice and heat application and alternate between Tylenol, and ibuprofen.   -If his symptoms do not improve in 1 week, we will order an x-ray of the left knee and refer him to physical therapy or orthopedics.    Return in 4-5 months for continued management of chronic conditions.    There are no Patient Instructions on file for this visit.  Plan of care reviewed with patient at the conclusion of today's visit. Education was provided regarding diagnosis, management and any prescribed or recommended OTC medications.  Patient verbalizes understanding of and agreement with management plan.    Follow Up  Return for Next Scheduled Follow up.    KIANA Valencia     Transcribed from ambient dictation for KIANA Valencia by Sanaz Rhodes.  03/26/24   17:18 EDT    Patient or patient representative verbalized consent to the visit recording.  I have personally performed the services described in this document as transcribed by the above individual, and it is both accurate and complete.

## 2024-04-04 ENCOUNTER — OFFICE VISIT (OUTPATIENT)
Dept: INTERNAL MEDICINE | Facility: CLINIC | Age: 79
End: 2024-04-04
Payer: MEDICARE

## 2024-04-04 VITALS
HEIGHT: 72 IN | WEIGHT: 190.4 LBS | HEART RATE: 68 BPM | SYSTOLIC BLOOD PRESSURE: 116 MMHG | OXYGEN SATURATION: 97 % | TEMPERATURE: 98.4 F | BODY MASS INDEX: 25.79 KG/M2 | DIASTOLIC BLOOD PRESSURE: 78 MMHG

## 2024-04-04 DIAGNOSIS — M25.562 ACUTE PAIN OF LEFT KNEE: Primary | ICD-10-CM

## 2024-04-04 NOTE — PROGRESS NOTES
[facility]     Andrea Rosales Jr.  1945   [id number]      Patient Care Team:  Alberto Denis MD as PCP - General  Alberto Denis MD as PCP - Family Medicine  Dann Max MD as Consulting Physician (Cardiology)    Chief Complaint::   Chief Complaint   Patient presents with    Knee Pain     left        HPI  The patient comes in complaining of left knee pain.    The patient recounts an active participation in tennis two weeks ago, during which he stepped on one occasion but was able to continue. However, after a six-day trip to [place], during which he experienced significant pain and instability in his left knee while walking at home two to three times daily. Despite the absence of a fall, he experienced instability, necessitating self-control. These symptoms persisted for several days, prompting him to seek medical attention. He consulted with a physician last Tuesday, who prescribed ibuprofen to be taken twice daily with Tylenol midday. Despite this, he continued to play tennis. However, after a long walk on Thursday, he experienced difficulty walking upon standing. The following day, he took a shorter walk, which exacerbated his symptoms. He rested the knee on Saturday, which has since improved his symptoms. He has also taken shorter walks a few times, which do not seem to exacerbate his symptoms. He expresses uncertainty about the specific exercises he should perform. He localizes his pain to the posterior and lateral aspects of the ligament, describing the pain as stiffness rather than pain. He experiences discomfort when sitting on the toilet and requires support before walking. He has not been playing tennis recently. He recalls a two-week period of relative inactivity following cataract surgery, during which he abstained from tennis for two weeks. He is uncertain if this period of relative inactivity could have contributed to his knee pain. He has previously  undergone physical therapy at [facility] for a meniscus tear approximately 10 years ago, which did not require surgical intervention. He performed exercises at home, which improved his condition.    Supplemental Information  He received his last COVID-19 vaccine in 09/2023.    Chronic Conditions:      Patient Active Problem List   Diagnosis    Hyperlipidemia    Osteoarthritis    Palpitations    Prediabetes    Hypercholesterolemia    Primary hypertension    History of prostate cancer    Annual physical exam    Premature atrial contraction    Localized, secondary osteoarthritis of the shoulder region, right    Viral upper respiratory tract infection    Sore throat        Past Medical History:   Diagnosis Date    Arthritis Shoulder, 2019    Benign essential hypertension     Cataract Forming, 2018/19    Diverticulosis Diverticulosis, 9/2019    GERD (gastroesophageal reflux disease) Appeared to have GERD about 3 years ago    History of colonic polyps     History of prostate cancer     HL (hearing loss) For many years, hearing aid 10,2019, no difference    Hypercholesterolemia     Prediabetes     Prostate cancer     S/P PROSTATECTOMY    Right-sided back pain     Visual impairment Driving at night, outside city       Past Surgical History:   Procedure Laterality Date    COLONOSCOPY  09/2019    PROSTATECTOMY  1998    Prostate cancer-Radical     TONSILLECTOMY         Family History   Problem Relation Age of Onset    Breast cancer Mother     Cancer Mother         Breast cancer, then spread    Stroke Father         Cause of death    Stomach cancer Father         Gastric    Alcohol abuse Father         But not out of hand    Cancer Father         Bladder    Breast cancer Sister     Cancer Sister         Breast cancer, then spread       Social History     Socioeconomic History    Marital status:    Tobacco Use    Smoking status: Never    Smokeless tobacco: Never   Vaping Use    Vaping status: Never Used   Substance and  "Sexual Activity    Alcohol use: Yes     Comment: Only  drink alcoholic beverages occasionally    Drug use: No    Sexual activity: Not Currently     Partners: Female     Birth control/protection: Other     Comment: Prostate extracted       No Known Allergies      Current Outpatient Medications:     aspirin 81 MG EC tablet, Take 1 tablet by mouth Daily., Disp: , Rfl:     atorvastatin (LIPITOR) 40 MG tablet, Take 1 tablet by mouth Daily., Disp: 90 tablet, Rfl: 3    Multiple Vitamin (MULTI-VITAMIN DAILY PO), Take 1 tablet by mouth Daily., Disp: , Rfl:     nebivolol (BYSTOLIC) 5 MG tablet, Take 1 tablet by mouth Daily., Disp: 90 tablet, Rfl: 3    Review of Systems   Constitutional:  Negative for chills, fatigue and fever.   HENT:  Negative for congestion, ear pain and sinus pressure.    Respiratory:  Negative for cough, chest tightness, shortness of breath and wheezing.    Cardiovascular:  Negative for chest pain and palpitations.   Gastrointestinal:  Negative for abdominal pain, blood in stool and constipation.   Musculoskeletal:         Left knee pain   Skin:  Negative for color change.   Allergic/Immunologic: Negative for environmental allergies.   Neurological:  Negative for dizziness, speech difficulty and headache.   Psychiatric/Behavioral:  Negative for decreased concentration. The patient is not nervous/anxious.         Vital Signs  Vitals:    04/04/24 1033   BP: 116/78   BP Location: Left arm   Patient Position: Sitting   Cuff Size: Adult   Pulse: 68   Temp: 98.4 °F (36.9 °C)   SpO2: 97%   Weight: 86.4 kg (190 lb 6.4 oz)   Height: 182 cm (71.65\")   PainSc:   1   PainLoc: Knee       Physical Exam  Constitutional:       General: He is not in acute distress.     Appearance: Normal appearance.   Musculoskeletal:      Right lower leg: No edema.      Left lower leg: No edema.      Comments: Examination of the left knee is unremarkable.   Skin:     General: Skin is warm and dry.   Neurological:      Mental Status: He " is alert.          Procedures    ACE III MINI             Assessment/Plan:    Diagnoses and all orders for this visit:    1. Acute pain of left knee (Primary)  -     Ambulatory Referral to Physical Therapy Evaluate and treat    1. Left knee pain.  The patient has a documented history of a torn meniscus, which previously responded positively to therapy. However, conservative therapy has not yielded significant relief within a week. A referral for formal physical therapy will be initiated.    Follow-up  The patient is scheduled for a follow-up visit as previously arranged.      Plan of care reviewed with patient at the conclusion of today's visit. Education was provided regarding diagnosis, management, and any prescribed or recommended OTC medications.Patient verbalizes understanding of and agreement with management plan.       Transcribed from ambient dictation for Alberto Denis MD by Tosin Hall.  04/04/24   13:29 EDT    Patient or patient representative verbalized consent to the visit recording.  I have personally performed the services described in this document as transcribed by the above individual, and it is both accurate and complete.

## 2024-04-15 ENCOUNTER — TREATMENT (OUTPATIENT)
Dept: PHYSICAL THERAPY | Facility: CLINIC | Age: 79
End: 2024-04-15
Payer: MEDICARE

## 2024-04-15 DIAGNOSIS — G89.29 CHRONIC PAIN OF LEFT KNEE: Primary | ICD-10-CM

## 2024-04-15 DIAGNOSIS — M25.562 CHRONIC PAIN OF LEFT KNEE: Primary | ICD-10-CM

## 2024-04-15 PROCEDURE — 97110 THERAPEUTIC EXERCISES: CPT | Performed by: PHYSICAL THERAPIST

## 2024-04-15 PROCEDURE — 97161 PT EVAL LOW COMPLEX 20 MIN: CPT | Performed by: PHYSICAL THERAPIST

## 2024-04-15 PROCEDURE — 97530 THERAPEUTIC ACTIVITIES: CPT | Performed by: PHYSICAL THERAPIST

## 2024-04-15 NOTE — PROGRESS NOTES
Physical Therapy Initial Evaluation and Plan of Care  [address], [address]  [place] [address]    Patient: Andrea Rosales Jr.   : 1945  Diagnosis/ICD-10 Code:  Chronic pain of left knee [M25.562, G89.29]  Referring practitioner: Alberto Denis MD  Date of Initial Visit: 4/15/2024  Today's Date: 4/15/2024  Patient seen for 1 session         Visit Diagnoses:    ICD-10-CM ICD-9-CM   1. Chronic pain of left knee  M25.562 719.46    G89.29 338.29         Subjective Questionnaire: LEFS: 48/80      Subjective Evaluation    History of Present Illness  Date of onset: 3/19/2024  Mechanism of injury: One month ago, had some left knee pain  Tennis 2-3 x a week    Pain with sudden movements, hard to bend the knee, was painful    Went to [place] for a few days, 2-3 x a day the knee would give out   Would feel fine otherwise     Walking is fine now, able to take normal steps     Seems to get stiff at times    Previous meniscus tear in  - did not require surgery ; did therapy    Pain with: up/down off toilet, descending steps     Pain with sudden movements, not at rest     Right before this had both cataracts done, did not play     Does not swell   Tried some Motrin    Goal is to return to tennis         Pain  Current pain ratin             Objective          Observations   Left Knee   Negative for edema.     Additional Knee Observation Details  No edema noted    Palpation     Additional Palpation Details  No tenderness with palpation to the left knee    When he bends his knee fully he does have some pain at the medial joint line and in the posterior knee, popliteal fossa    Tenderness     Additional Tenderness Details  No tenderness noted    Neurological Testing     Sensation     Knee   Left Knee   Intact: Light touch    Right Knee   Intact: light touch     Active Range of Motion   Left Knee   Flexion: 127 degrees     Right Knee   Flexion: 140 degrees     Patellar Mobility   Left Knee Patellar  tendons within functional limits include the medial, lateral, superior and inferior.     Strength/Myotome Testing     Left Knee   Flexion: 4+  Extension: 4  Quadriceps contraction: fair    Right Knee   Quadriceps contraction: good    Additional Strength Details  Difficulty maintaining terminal knee extension with straight leg raise    Tests     Left Knee   Negative anterior drawer, lateral Judie, medial Judie, posterior drawer, valgus stress test at 30 degrees and varus stress test at 30 degrees.     Additional Tests Details  90/90 hamstring test: Patient lacks approximately 50 degrees of knee extension indicating very tight hamstrings          Assessment & Plan       Assessment  Impairments: abnormal gait, abnormal muscle firing, abnormal or restricted ROM, activity intolerance, impaired physical strength, lacks appropriate home exercise program and pain with function   Functional limitations: lifting, walking, pushing, uncomfortable because of pain, sitting, standing and stooping   Assessment details: Very pleasant 78-year-old patient presents to physical therapy with left knee pain over the last several weeks; he is an avid  but had taken time off for bilateral cataract surgeries and resume playing tennis after for 5 weeks layoff; upon returning to playing tennis he noticed that he was having some intermittent sharp pain with certain movements; he also was having some pain with walking as well as eccentric loading of the quadriceps when he sat down or descended steps; his knee has given way several times but he has not had any falls; patient does have a history of meniscus tear on imaging but never required surgery to fix this; he did some physical therapy 9 years ago which helped; patient most likely at this point in time needs some reconditioning of his left knee to build some strength for load tolerance improvement in tennis type activities so that he can return to the court  Barriers to  "therapy: Meniscus tear, arthritis  Prognosis: fair    Goals  Plan Goals: 3 weeks  1.  Patient to be independent with home exercise program  2.  Patient to display full knee range of motion without pain for ADLs  3.  Patient to improve manual muscle test strength to 5 out of 5 left lower extremity    6 weeks  1.Patient to improve LEFS score by 1M FARIBA  2.  Patient to perform up to 60 minutes of tennis specific exercise and mobility training without a significant increase in pain so that he can return to playing tennis  3.  Patient to improve hamstring flexibility with 9090 testing by 50%    Plan  Therapy options: will be seen for skilled therapy services  Planned modality interventions: iontophoresis, TENS, thermotherapy (hydrocollator packs), ultrasound, cryotherapy, dry needling and high voltage pulsed current (pain management)  Planned therapy interventions: manual therapy, neuromuscular re-education, soft tissue mobilization, strengthening, stretching, therapeutic activities, joint mobilization, home exercise program, gait training, functional ROM exercises, flexibility, balance/weight-bearing training and body mechanics training  Frequency: 2x week  Duration in weeks: 6  Treatment plan discussed with: patient        Access Code: [email or web address]  URL: [email or web address]  Date: 04/15/2024  Prepared by: Hector Tolentino    Exercises  - Supine Quad Set  - 2 x daily - 7 x weekly - 1 sets - 30 reps - 3\" hold  - Supine Heel Slide with Strap  - 2 x daily - 7 x weekly - 1 sets - 10 reps - 3\" hold  - Supine Active Straight Leg Raise  - 2 x daily - 7 x weekly - 3 sets - 10 reps - 3 hold  - Sidelying Hip Abduction  - 2 x daily - 7 x weekly - 3 sets - 10 reps - 3 hold  - Supine Bridge  - 2 x daily - 7 x weekly - 3 sets - 10 reps - 5\" hold  - Supine Hamstring Stretch  - 2 x daily - 7 x weekly - 2 sets - 10 reps    Timed:         Manual Therapy:         mins  85750;     Therapeutic Exercise:    15     mins  48835;   "   Neuromuscular Carla:        mins  42153;    Therapeutic Activity:     10     mins  60991;     Gait Training:           mins  10529;     Ultrasound:          mins  86385;    Ionto                                   mins   00418  Self Care                            mins   24833  Canalith Repos         mins 02838      Un-Timed:  Electrical Stimulation:         mins  86292 ( );  Dry Needling          mins self-pay  Traction          mins 76233    Low Eval     30     Mins  45969  Mod Eval          Mins  72448  High Eval                            Mins  41822        Timed Treatment:   25   mins   Total Treatment:     55   mins          PT: ARLYN Tolentino PT     License Number: [id number]  Electronically signed by ARLYN Tolentino PT, 04/15/24, 11:09 AM EDT    Certification Period: 4/15/2024 thru 7/13/2024  I certify that the therapy services are furnished while this patient is under my care.  The services outlined above are required by this patient, and will be reviewed every 90 days.         Physician Signature:__________________________________________________    PHYSICIAN: Alberto Denis MD  NPI: [id number]                                      DATE:      Please sign and return via fax to [email or web address] . Thank you, [facility].

## 2024-04-22 ENCOUNTER — TREATMENT (OUTPATIENT)
Dept: PHYSICAL THERAPY | Facility: CLINIC | Age: 79
End: 2024-04-22
Payer: MEDICARE

## 2024-04-22 DIAGNOSIS — G89.29 CHRONIC PAIN OF LEFT KNEE: Primary | ICD-10-CM

## 2024-04-22 DIAGNOSIS — M25.562 CHRONIC PAIN OF LEFT KNEE: Primary | ICD-10-CM

## 2024-04-22 PROCEDURE — 97112 NEUROMUSCULAR REEDUCATION: CPT | Performed by: PHYSICAL THERAPIST

## 2024-04-22 PROCEDURE — 97140 MANUAL THERAPY 1/> REGIONS: CPT | Performed by: PHYSICAL THERAPIST

## 2024-04-22 PROCEDURE — 97110 THERAPEUTIC EXERCISES: CPT | Performed by: PHYSICAL THERAPIST

## 2024-04-22 PROCEDURE — 97530 THERAPEUTIC ACTIVITIES: CPT | Performed by: PHYSICAL THERAPIST

## 2024-04-22 NOTE — PROGRESS NOTES
"Physical Therapy Daily Treatment Note  [address], [address]  [place] [address]    Patient: Andrea Rosales Jr.   : 1945  Referring practitioner: Alberto Denis MD  Date of Initial Visit: Type: THERAPY  Noted: 4/15/2024  Today's Date: 2024  Patient seen for 2 sessions       Visit Diagnoses:    ICD-10-CM ICD-9-CM   1. Chronic pain of left knee  M25.562 719.46    G89.29 338.29       Visit #: 2    Subjective   Pain when first getting up and starting to walk; pain with first few minutes of walk then feels better  Pain in the back of the knee  Pain when trying to bend the knee further    Objective   See Exercise, Manual, and Modality Logs for complete treatment    Nearly full flexion ROM after manual therapy      Access Code: [email or web address]  URL: [email or web address]  Date: 04/15/2024  Prepared by: Hector Tolentino     Exercises  - Supine Quad Set  - 2 x daily - 7 x weekly - 1 sets - 30 reps - 3\" hold  - Supine Heel Slide with Strap  - 2 x daily - 7 x weekly - 1 sets - 10 reps - 3\" hold  - Supine Active Straight Leg Raise  - 2 x daily - 7 x weekly - 3 sets - 10 reps - 3 hold 2#  - Sidelying Hip Abduction  - 2 x daily - 7 x weekly - 3 sets - 10 reps - 3 hold 2#  - Supine Bridge  - 2 x daily - 7 x weekly - 3 sets - 10 reps - 5\" hold  - Supine Hamstring Stretch  - 2 x daily - 7 x weekly - 2 sets - 10 reps    Prone quad set, prone hip extension, swiss ball bridges, HS curl isometrics, knee flexion self mobs with towel ; TG squats L10 x 5 minutes  Recumbent bike x 10 minutes at fast pace for strength    Assessment/Plan  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 ; should continue to benefit from PT to improve functional mobility and strength     Treatment considerations for next visit:  Advance as tolerated     Ice to left posterior knee x 10 minutes     Timed:   Manual Therapy:    10     mins  49233;     Therapeutic Exercise:    15     mins  89163;     Neuromuscular Carla:    15    " mins  86618;    Therapeutic Activity:     15     mins  43554;     Gait Training:           mins  05618;     Ultrasound:          mins  86456;    Ionto                                   mins   23048  Self Care                            mins   03435  Canalith Repos         mins   91994    Un-Timed:  Electrical Stimulation:         mins  03027 ( );  Dry Needling          mins self-pay  Traction          mins 95729      Timed Treatment:   55   mins   Total Treatment:     65   mins    ARLYN Tolentino, PT  [place] License: [id number]

## 2024-04-29 ENCOUNTER — TREATMENT (OUTPATIENT)
Dept: PHYSICAL THERAPY | Facility: CLINIC | Age: 79
End: 2024-04-29
Payer: MEDICARE

## 2024-04-29 DIAGNOSIS — G89.29 CHRONIC PAIN OF LEFT KNEE: Primary | ICD-10-CM

## 2024-04-29 DIAGNOSIS — M25.562 CHRONIC PAIN OF LEFT KNEE: Primary | ICD-10-CM

## 2024-04-29 NOTE — PROGRESS NOTES
"Physical Therapy Daily Treatment Note  [address] [facility], [address]  [place] [address]    Patient: Andrea Rosales Jr.   : 1945  Referring practitioner: Alberto Denis MD  Date of Initial Visit: Type: THERAPY  Noted: 4/15/2024  Today's Date: 2024  Patient seen for 3 sessions       Visit Diagnoses:    ICD-10-CM ICD-9-CM   1. Chronic pain of left knee  M25.562 719.46    G89.29 338.29       Visit #: 3    Subjective   Knee has been feeling better this week; doing exercises at home   Has a swiss ball at home so will incorporate these exercises    Objective   See Exercise, Manual, and Modality Logs for complete treatment    Improved knee mobility with flexion     [employer] Exercises:  Nearly full flexion ROM after manual therapy      Access Code: [email or web address]  URL: [email or web address]  Date: 04/15/2024  Prepared by: Hector oTlentino     Exercises  - Supine Quad Set  - 2 x daily - 7 x weekly - 1 sets - 30 reps - 3\" hold  - Supine Heel Slide with Strap  - 2 x daily - 7 x weekly - 1 sets - 10 reps - 3\" hold  - Supine Active Straight Leg Raise  - 2 x daily - 7 x weekly - 3 sets - 10 reps - 3 hold 2#  - Sidelying Hip Abduction  - 2 x daily - 7 x weekly - 3 sets - 10 reps - 3 hold 2#  - Supine Bridge  - 2 x daily - 7 x weekly - 3 sets - 10 reps - 5\" hold  - Supine Hamstring Stretch  - 2 x daily - 7 x weekly - 2 sets - 10 reps     Prone quad set, prone hip extension, swiss ball bridges, HS curl isometrics, knee flexion self mobs with towel ; TG squats L10 x 6 minutes  Recumbent bike x 10 minutes at fast pace for strength    Wall squats with green swiss ball  Lunge to 12\" step alternating leg    Assessment/Plan  Patient's mobility is improving; load tolerance with closed chain strength is improving; needs continued PT to improve function and return to tennis    Treatment considerations for next visit:  Advance as tolerated     Timed:   Manual Therapy:    10     mins  76394;     Therapeutic Exercise:   "  15     mins  95198;     Neuromuscular Carla:    15    mins  75008;    Therapeutic Activity:     15     mins  49425;     Gait Training:           mins  39024;     Ultrasound:          mins  94791;    Ionto                                   mins   14758  Self Care                            mins   05920  Canalith Repos         mins   99896    Un-Timed:  Electrical Stimulation:         mins  28615 ( );  Dry Needling          mins self-pay  Traction          mins 80761      Timed Treatment:   55   mins   Total Treatment:     65   mins    ARLYN Tolentino, PT  [place] License: [id number]

## 2024-05-07 ENCOUNTER — TREATMENT (OUTPATIENT)
Dept: PHYSICAL THERAPY | Facility: CLINIC | Age: 79
End: 2024-05-07
Payer: MEDICARE

## 2024-05-07 DIAGNOSIS — M25.562 CHRONIC PAIN OF LEFT KNEE: Primary | ICD-10-CM

## 2024-05-07 DIAGNOSIS — G89.29 CHRONIC PAIN OF LEFT KNEE: Primary | ICD-10-CM

## 2024-05-07 PROCEDURE — 97530 THERAPEUTIC ACTIVITIES: CPT | Performed by: PHYSICAL THERAPIST

## 2024-05-07 PROCEDURE — 97112 NEUROMUSCULAR REEDUCATION: CPT | Performed by: PHYSICAL THERAPIST

## 2024-05-07 PROCEDURE — 97110 THERAPEUTIC EXERCISES: CPT | Performed by: PHYSICAL THERAPIST

## 2024-05-10 RX ORDER — ATORVASTATIN CALCIUM 40 MG/1
40 TABLET, FILM COATED ORAL DAILY
Qty: 90 TABLET | Refills: 0 | Status: SHIPPED | OUTPATIENT
Start: 2024-05-10

## 2024-05-15 ENCOUNTER — OFFICE VISIT (OUTPATIENT)
Dept: INTERNAL MEDICINE | Facility: CLINIC | Age: 79
End: 2024-05-15
Payer: MEDICARE

## 2024-05-15 ENCOUNTER — HOSPITAL ENCOUNTER (OUTPATIENT)
Dept: GENERAL RADIOLOGY | Facility: HOSPITAL | Age: 79
Discharge: HOME | End: 2024-05-15
Admitting: NURSE PRACTITIONER
Payer: MEDICARE

## 2024-05-15 VITALS
DIASTOLIC BLOOD PRESSURE: 78 MMHG | BODY MASS INDEX: 25.9 KG/M2 | HEIGHT: 71 IN | WEIGHT: 185 LBS | SYSTOLIC BLOOD PRESSURE: 116 MMHG | HEART RATE: 72 BPM

## 2024-05-15 DIAGNOSIS — M25.562 LEFT KNEE PAIN, UNSPECIFIED CHRONICITY: Primary | ICD-10-CM

## 2024-05-15 DIAGNOSIS — M25.562 LEFT KNEE PAIN, UNSPECIFIED CHRONICITY: ICD-10-CM

## 2024-05-15 PROCEDURE — 3078F DIAST BP <80 MM HG: CPT | Performed by: NURSE PRACTITIONER

## 2024-05-15 PROCEDURE — 73560 X-RAY EXAM OF KNEE 1 OR 2: CPT

## 2024-05-15 PROCEDURE — 1125F AMNT PAIN NOTED PAIN PRSNT: CPT | Performed by: NURSE PRACTITIONER

## 2024-05-15 PROCEDURE — 99214 OFFICE O/P EST MOD 30 MIN: CPT | Performed by: NURSE PRACTITIONER

## 2024-05-15 PROCEDURE — 3074F SYST BP LT 130 MM HG: CPT | Performed by: NURSE PRACTITIONER

## 2024-05-15 NOTE — PROGRESS NOTES
"    Office Note     Name: Andrea Rosales Jr.    : 1945     MRN: [id number]     Chief Complaint  Knee Pain (Left.  Has been doing physical therapy as ordered by Dr. Freeman but not seeing much improvement.  Felt something \"snap\" 2 days ago while walking in [place].  )    Subjective     History of Present Illness:  Andrea Rosales Jr. is a 78 y.o. male who presents today for recurrent left knee pain. He has gone to 4-5 weekly PT appointments and reports he was getting some improvement with movement and pain until her returned to [place] and was walking while pushing his grandkids in a stroller 3 days ago.  He felt his left knee lock and had pain which led to a difficult time walking for the next few days. He reports the pain is improving each day but is still bothering him. He is resting the knee and has taken a break from his usual activity of playing tennis for the last few weeks.  He asks if he should not go to PT.  He would like to see Ortho since this is recurrent for the last 6 weeks. Denies any falls related to knee pain.         Past Medical History:   Past Medical History:   Diagnosis Date    Arthritis Shoulder, 2019    Benign essential hypertension     Cataract Forming,     Diverticulosis Diverticulosis, 2019    GERD (gastroesophageal reflux disease) Appeared to have GERD about 3 years ago    History of colonic polyps     History of prostate cancer     HL (hearing loss) For many years, hearing aid 10,2019, no difference    Hypercholesterolemia     Prediabetes     Prostate cancer     S/P PROSTATECTOMY    Right-sided back pain     Visual impairment Driving at night, outside city       Past Surgical History:   Past Surgical History:   Procedure Laterality Date    COLONOSCOPY  2019    PROSTATECTOMY  1998    Prostate cancer-Radical     TONSILLECTOMY         Family History:   Family History   Problem Relation Age of Onset    Breast cancer Mother     Cancer Mother         Breast " cancer, then spread    Stroke Father         Cause of death    Stomach cancer Father         Gastric    Alcohol abuse Father         But not out of hand    Cancer Father         Bladder    Breast cancer Sister     Cancer Sister         Breast cancer, then spread       Social History:   Social History     Socioeconomic History    Marital status:    Tobacco Use    Smoking status: Never    Smokeless tobacco: Never   Vaping Use    Vaping status: Never Used   Substance and Sexual Activity    Alcohol use: Yes     Comment: Only  drink alcoholic beverages occasionally    Drug use: No    Sexual activity: Not Currently     Partners: Female     Birth control/protection: Other     Comment: Prostate extracted       Immunizations:   Immunization History   Administered Date(s) Administered    COVID-19 (PFIZER) BIVALENT 12+YRS 09/28/2022    COVID-19 (PFIZER) Purple Cap Monovalent 01/29/2021, 02/26/2021, 10/11/2021    COVID-19 F23 (PFIZER) 12YRS+ (COMIRNATY) 10/04/2023    Covid-19 (Pfizer) Gray Cap Monovalent 05/06/2022    FLUAD TRI 65YR+ 01/24/2018    Fluad Quad 65+ 11/12/2021, 10/21/2023    Fluzone (or Fluarix & Flulaval for VFC) >6mos 11/12/2021    Fluzone High Dose =>65 Years ([employer] ONLY) 12/31/2018, 10/16/2019, 08/19/2020    Fluzone High-Dose 65+yrs 08/17/2020, 10/07/2022    Pneumococcal Conjugate 13-Valent (PCV13) 11/20/2020    Pneumococcal Polysaccharide (PPSV23) 11/24/2021    Tdap 03/16/2022        Medications:     Current Outpatient Medications:     aspirin 81 MG EC tablet, Take 1 tablet by mouth Daily., Disp: , Rfl:     atorvastatin (LIPITOR) 40 MG tablet, TAKE 1 TABLET BY MOUTH DAILY, Disp: 90 tablet, Rfl: 0    Multiple Vitamin (MULTI-VITAMIN DAILY PO), Take 1 tablet by mouth Daily., Disp: , Rfl:     nebivolol (BYSTOLIC) 5 MG tablet, Take 1 tablet by mouth Daily., Disp: 90 tablet, Rfl: 3    Allergies:   No Known Allergies    Objective     Vital Signs  /78 (BP Location: Left arm, Patient Position: Sitting,  "Cuff Size: Adult)   Pulse 72   Ht 180.3 cm (71\")   Wt 83.9 kg (185 lb)   BMI 25.80 kg/m²   Estimated body mass index is 25.8 kg/m² as calculated from the following:    Height as of this encounter: 180.3 cm (71\").    Weight as of this encounter: 83.9 kg (185 lb).           Physical Exam  Vitals and nursing note reviewed.   Constitutional:       Appearance: Normal appearance.   Cardiovascular:      Rate and Rhythm: Normal rate and regular rhythm.      Heart sounds: Normal heart sounds.   Pulmonary:      Effort: Pulmonary effort is normal.      Breath sounds: Normal breath sounds.   Musculoskeletal:      Left knee: Swelling present. No erythema or crepitus. Normal range of motion. No tenderness.        Legs:    Neurological:      Mental Status: He is alert.          Assessment and Plan     Assessment/Plan:  Diagnoses and all orders for this visit:    1. Left knee pain, unspecified chronicity (Primary)  Assessment & Plan:  Instructed to continue going to PT  Xray ordered.   Placed Ortho referral per pt request.     Orders:  -     XR Knee 1 or 2 View Left; Future  -     Ambulatory Referral to Orthopedic Surgery        Follow Up  Return for Next scheduled follow up.    KIANA Javed   [facility] [address]  "

## 2024-05-16 ENCOUNTER — TREATMENT (OUTPATIENT)
Dept: PHYSICAL THERAPY | Facility: CLINIC | Age: 79
End: 2024-05-16
Payer: MEDICARE

## 2024-05-16 DIAGNOSIS — M25.562 CHRONIC PAIN OF LEFT KNEE: Primary | ICD-10-CM

## 2024-05-16 DIAGNOSIS — G89.29 CHRONIC PAIN OF LEFT KNEE: Primary | ICD-10-CM

## 2024-05-16 PROCEDURE — 97110 THERAPEUTIC EXERCISES: CPT | Performed by: PHYSICAL THERAPIST

## 2024-05-16 PROCEDURE — 97112 NEUROMUSCULAR REEDUCATION: CPT | Performed by: PHYSICAL THERAPIST

## 2024-05-16 PROCEDURE — 97530 THERAPEUTIC ACTIVITIES: CPT | Performed by: PHYSICAL THERAPIST

## 2024-05-16 NOTE — PROGRESS NOTES
"Physical Therapy Daily Treatment Note  [address], [address]  [place] [address]    Patient: Andrea Rosales Jr.   : 1945  Referring practitioner: Alberto Denis MD  Date of Initial Visit: Type: THERAPY  Noted: 4/15/2024  Today's Date: 2024  Patient seen for 5 sessions       Visit Diagnoses:    ICD-10-CM ICD-9-CM   1. Chronic pain of left knee  M25.562 719.46    G89.29 338.29       Visit #: 5    Subjective   Patient was in [place] last week, knee was doing fine, but while crossing the street pushing a stroller, his knee had a catching/sharp pain; afterwards he had pain/stiffness and difficulty walking  It has since improved but still not back to where it was; he saw PCP and has been referred to ortho for consult   X rays taken    Objective   See Exercise, Manual, and Modality Logs for complete treatment    Full knee motion; no tenderness to palpate  Mild edema, + patella ballotment test     [employer] Exercises:  [employer] Exercises:  Access Code: [email or web address]  URL: [email or web address]  Date: 04/15/2024  Prepared by: Hector Tolentino     Exercises  - Supine Quad Set  - 2 x daily - 7 x weekly - 1 sets - 30 reps - 3\" hold  - Supine Heel Slide with Strap  - 2 x daily - 7 x weekly - 1 sets - 10 reps - 3\" hold  - Supine Active Straight Leg Raise  - 2 x daily - 7 x weekly - 3 sets - 10 reps - 3 hold 2#  - Sidelying Hip Abduction  - 2 x daily - 7 x weekly - 3 sets - 10 reps - 3 hold 2#  - Supine Bridge  - 2 x daily - 7 x weekly - 3 sets - 10 reps - 5\" hold  - Supine Hamstring Stretch  - 2 x daily - 7 x weekly - 2 sets - 10 reps     Prone quad set, prone hip extension, swiss ball bridges, HS curl isometrics, knee flexion self mobs with towel ; TG squats L10 x 6 minutes(TG deferred today)  Recumbent bike x 10 minutes at fast pace for strength     Wall squats with green swiss ball (defer)  Lunge to 12\" step alternating leg (defer)       Assessment/Plan  Patient likely with exacerbation of " pain due to OA and chronic meniscus tear ; patient needs continued PT for mobility and strength    Treatment considerations for next visit:  Advance as tolerated     Timed:   Manual Therapy:         mins  31223;     Therapeutic Exercise:    15     mins  11493;     Neuromuscular Carla:    15    mins  17272;    Therapeutic Activity:     15     mins  63011;     Gait Training:           mins  22533;     Ultrasound:          mins  41508;    Ionto                                   mins   96123  Self Care                            mins   22447  Canalith Repos         mins   42948    Un-Timed:  Electrical Stimulation:         mins  33843 ( );  Dry Needling          mins self-pay  Traction          mins 98424      Timed Treatment:   45   mins   Total Treatment:     55   mins    ARLYN Tolentino, PT  [place] License: [id number]

## 2024-05-22 ENCOUNTER — TREATMENT (OUTPATIENT)
Dept: PHYSICAL THERAPY | Facility: CLINIC | Age: 79
End: 2024-05-22
Payer: MEDICARE

## 2024-05-22 DIAGNOSIS — M25.562 CHRONIC PAIN OF LEFT KNEE: Primary | ICD-10-CM

## 2024-05-22 DIAGNOSIS — G89.29 CHRONIC PAIN OF LEFT KNEE: Primary | ICD-10-CM

## 2024-05-22 PROCEDURE — 97530 THERAPEUTIC ACTIVITIES: CPT | Performed by: PHYSICAL THERAPIST

## 2024-05-22 PROCEDURE — 97112 NEUROMUSCULAR REEDUCATION: CPT | Performed by: PHYSICAL THERAPIST

## 2024-05-22 PROCEDURE — 97110 THERAPEUTIC EXERCISES: CPT | Performed by: PHYSICAL THERAPIST

## 2024-05-22 NOTE — PROGRESS NOTES
"Physical Therapy Daily Treatment Note  [address] [facility], [address]  [place] [address]    Patient: Andrea Rosales Jr.   : 1945  Referring practitioner: Alberto Denis MD  Date of Initial Visit: Type: THERAPY  Noted: 4/15/2024  Today's Date: 2024  Patient seen for 6 sessions       Visit Diagnoses:    ICD-10-CM ICD-9-CM   1. Chronic pain of left knee  M25.562 719.46    G89.29 338.29       Visit #: 6    Subjective   Knee is getting back to where it was before I tweaked it in [place]  No more jabbing pains or locking lately    Objective   See Exercise, Manual, and Modality Logs for complete treatment    Good knee mobility     Line walking; lunge to a step     ASYM III Exercises:  Access Code: [email or web address]  URL: [email or web address]  Date: 04/15/2024  Prepared by: Hector Tolentino     Exercises  - Supine Quad Set  - 2 x daily - 7 x weekly - 1 sets - 30 reps - 3\" hold  - Supine Heel Slide with Strap  - 2 x daily - 7 x weekly - 1 sets - 10 reps - 3\" hold  - Supine Active Straight Leg Raise  - 2 x daily - 7 x weekly - 3 sets - 10 reps - 3 hold 2#  - Sidelying Hip Abduction  - 2 x daily - 7 x weekly - 3 sets - 10 reps - 3 hold 2#  - Supine Bridge  - 2 x daily - 7 x weekly - 3 sets - 10 reps - 5\" hold  - Supine Hamstring Stretch  - 2 x daily - 7 x weekly - 2 sets - 10 reps     Prone quad set, prone hip extension, swiss ball bridges, HS curl isometrics, knee flexion self mobs with towel ; TG squats L10 x 6 minutes  Recumbent bike x 10 minutes at fast pace for strength     Wall squats with green swiss ball (defer)  Lunge to 12\" step alternating leg     Assessment/Plan  Needs verbal cues for slowing down with exercises; somewhat impulsive with movements; able to resume higher level strength activities today without pain; has not had any more jabbing pain or locking/catching episodes    Treatment considerations for next visit:  Advance strength as tolerated ; has consult with ortho surgery to discuss " treatment options     Timed:   Manual Therapy:         mins  82317;     Therapeutic Exercise:    25     mins  83875;     Neuromuscular Carla:    15    mins  13309;    Therapeutic Activity:     15     mins  22009;     Gait Training:           mins  66082;     Ultrasound:          mins  23000;    Ionto                                   mins   70606  Self Care                            mins   27217  Canalith Repos         mins   82829    Un-Timed:  Electrical Stimulation:         mins  72741 ( );  Dry Needling          mins self-pay  Traction          mins 69457      Timed Treatment:   55   mins   Total Treatment:     65   mins    ARLYN Tolentino, PT  [place] License: [id number]

## 2024-05-29 ENCOUNTER — OFFICE VISIT (OUTPATIENT)
Dept: ORTHOPEDIC SURGERY | Facility: CLINIC | Age: 79
End: 2024-05-29
Payer: MEDICARE

## 2024-05-29 VITALS
WEIGHT: 184.97 LBS | SYSTOLIC BLOOD PRESSURE: 124 MMHG | DIASTOLIC BLOOD PRESSURE: 86 MMHG | BODY MASS INDEX: 25.9 KG/M2 | HEIGHT: 71 IN

## 2024-05-29 DIAGNOSIS — M17.12 PRIMARY OSTEOARTHRITIS OF LEFT KNEE: Primary | ICD-10-CM

## 2024-05-29 RX ORDER — ROPIVACAINE HYDROCHLORIDE 5 MG/ML
4 INJECTION, SOLUTION EPIDURAL; INFILTRATION; PERINEURAL
Status: COMPLETED | OUTPATIENT
Start: 2024-05-29 | End: 2024-05-29

## 2024-05-29 RX ORDER — TRIAMCINOLONE ACETONIDE 40 MG/ML
40 INJECTION, SUSPENSION INTRA-ARTICULAR; INTRAMUSCULAR
Status: COMPLETED | OUTPATIENT
Start: 2024-05-29 | End: 2024-05-29

## 2024-05-29 RX ADMIN — TRIAMCINOLONE ACETONIDE 40 MG: 40 INJECTION, SUSPENSION INTRA-ARTICULAR; INTRAMUSCULAR at 10:52

## 2024-05-29 RX ADMIN — ROPIVACAINE HYDROCHLORIDE 4 ML: 5 INJECTION, SOLUTION EPIDURAL; INFILTRATION; PERINEURAL at 10:52

## 2024-05-29 NOTE — PROGRESS NOTES
Procedure   - Large Joint Arthrocentesis: L knee on 5/29/2024 10:52 AM  Indications: pain  Details: 21 G needle, anterolateral approach  Medications: 40 mg triamcinolone acetonide 40 MG/ML; 4 mL ropivacaine 0.5 %  Outcome: tolerated well, no immediate complications  Procedure, treatment alternatives, risks and benefits explained, specific risks discussed. Consent was given by the patient. Immediately prior to procedure a time out was called to verify the correct patient, procedure, equipment, support staff and site/side marked as required. Patient was prepped and draped in the usual sterile fashion.

## 2024-05-29 NOTE — PROGRESS NOTES
[facility] Note    Subjective     Chief Complaint   Patient presents with    Left Knee - Pain        HPI    Andrea Rosales Jr. is a 78 y.o. male who presents with new problem of: left knee pain.  Onset: atraumatic and gradual in nature. The issue has been ongoing for 2 week(s). Pain is a 1/10 on the pain scale. Pain is described as dull. Associated symptoms include pain, swelling, stiffness, and giving way/buckling. The pain is worse with  certain movements ; resting improve the pain. Previous treatments have included: NSAIDS and physical therapy.  No history of trauma.  He tried some physical therapy.  Persistent swelling.    I have reviewed the following portions of the patient's history and agree with: History of Present Illness and Review of Systems    Patient Active Problem List   Diagnosis    Hyperlipidemia    Osteoarthritis    Palpitations    Prediabetes    Hypercholesterolemia    Primary hypertension    History of prostate cancer    Annual physical exam    Premature atrial contraction    Localized, secondary osteoarthritis of the shoulder region, right    Viral upper respiratory tract infection    Sore throat    Left knee pain     Past Medical History:   Diagnosis Date    Arthritis Shoulder, 2019    Benign essential hypertension     Cataract Forming, 2018/19    Diverticulosis Diverticulosis, 9/2019    GERD (gastroesophageal reflux disease) Appeared to have GERD about 3 years ago    History of colonic polyps     History of prostate cancer     HL (hearing loss) For many years, hearing aid 10,2019, no difference    Hypercholesterolemia     Prediabetes     Prostate cancer     S/P PROSTATECTOMY    Right-sided back pain     Visual impairment Driving at night, outside city      Past Surgical History:   Procedure Laterality Date    COLONOSCOPY  09/2019    PROSTATECTOMY  1998    Prostate cancer-Radical     TONSILLECTOMY        Family History   Problem Relation Age of Onset    Breast cancer  Mother     Cancer Mother         Breast cancer, then spread    Stroke Father         Cause of death    Stomach cancer Father         Gastric    Alcohol abuse Father         But not out of hand    Cancer Father         Bladder    Breast cancer Sister     Cancer Sister         Breast cancer, then spread     Social History     Socioeconomic History    Marital status:    Tobacco Use    Smoking status: Never    Smokeless tobacco: Never   Vaping Use    Vaping status: Never Used   Substance and Sexual Activity    Alcohol use: Yes     Comment: Only  drink alcoholic beverages occasionally    Drug use: No    Sexual activity: Not Currently     Partners: Female     Birth control/protection: Other     Comment: Prostate extracted      Current Outpatient Medications on File Prior to Visit   Medication Sig Dispense Refill    aspirin 81 MG EC tablet Take 1 tablet by mouth Daily.      atorvastatin (LIPITOR) 40 MG tablet TAKE 1 TABLET BY MOUTH DAILY 90 tablet 0    Multiple Vitamin (MULTI-VITAMIN DAILY PO) Take 1 tablet by mouth Daily.      nebivolol (BYSTOLIC) 5 MG tablet Take 1 tablet by mouth Daily. 90 tablet 3     No current facility-administered medications on file prior to visit.      No Known Allergies     Review of Systems   Constitutional:  Negative for activity change, appetite change, chills, diaphoresis, fatigue, fever and unexpected weight change.   HENT:  Negative for congestion, dental problem, drooling, ear discharge, ear pain, facial swelling, hearing loss, mouth sores, nosebleeds, postnasal drip, rhinorrhea, sinus pressure, sneezing, sore throat, tinnitus, trouble swallowing and voice change.    Eyes:  Negative for photophobia, pain, discharge, redness, itching and visual disturbance.   Respiratory:  Negative for apnea, cough, choking, chest tightness, shortness of breath, wheezing and stridor.    Cardiovascular:  Negative for chest pain, palpitations and leg swelling.   Gastrointestinal:  Negative for  "abdominal distention, abdominal pain, anal bleeding, blood in stool, constipation, diarrhea, nausea, rectal pain and vomiting.   Endocrine: Negative for cold intolerance, heat intolerance, polydipsia, polyphagia and polyuria.   Genitourinary:  Negative for decreased urine volume, difficulty urinating, dysuria, enuresis, flank pain, frequency, genital sores, hematuria and urgency.   Musculoskeletal:  Positive for arthralgias. Negative for back pain, gait problem, joint swelling, myalgias, neck pain and neck stiffness.   Skin:  Negative for color change, pallor, rash and wound.   Allergic/Immunologic: Negative for environmental allergies, food allergies and immunocompromised state.   Neurological:  Negative for dizziness, tremors, seizures, syncope, facial asymmetry, speech difficulty, weakness, light-headedness, numbness and headaches.   Hematological:  Negative for adenopathy. Does not bruise/bleed easily.   Psychiatric/Behavioral:  Negative for agitation, behavioral problems, confusion, decreased concentration, dysphoric mood, hallucinations, self-injury, sleep disturbance and suicidal ideas. The patient is not nervous/anxious and is not hyperactive.         Objective      Physical Exam  /86   Ht 180.3 cm (70.98\")   Wt 83.9 kg (184 lb 15.5 oz)   BMI 25.81 kg/m²     Body mass index is 25.81 kg/m².           General:   Mental Status:  Alert   Appearance: Cooperative, in no acute distress   Build and Nutrition: Well-nourished well-developed male   Orientation: Alert and oriented to person, place and time   Posture: Normal   Gait: Nonantalgic    Integument:   Left knee: No skin lesions, no rash, no ecchymosis    Neurologic:   Sensation:    Left foot: Intact to light touch on the dorsal and plantar aspect   Motor:  Left lower extremity: 5/5 quadriceps, hamstrings, ankle dorsiflexors, and ankle plantar flexors  Vascular:   Left lower extremity: 2+ dorsalis pedis pulse, prompt capillary refill    Lower " Extremities:   Left Knee:    Tenderness:  None    Effusion:  1+    Swelling:  None    Crepitus:  Positive    Atrophy:  None    Range of motion:  Extension: 0°       Flexion: 125°  Instability:  No varus laxity, no valgus laxity, negative anterior drawer  Deformities:  None      Imaging/Studies      Imaging Results (Last 24 Hours)       ** No results found for the last 24 hours. **          XR KNEE 1 OR 2 VW LEFT     Date of Exam: 5/15/2024 11:39 AM EDT     Indication: pain for 6 weeks, locked up 2 days ago.     Comparison: None available.     Findings:  No soft tissue swelling. There are faint scattered vascular calcifications. There is a small knee joint effusion. No acute fracture or traumatic malalignment. There is degenerative sharpening of the intercondylar spines. There is mild joint space   narrowing and small marginal osteophytic lipping in the medial compartment. There is mild joint space narrowing and osteophytic change of the patellofemoral compartment. Superior patellar enthesophyte is noted. A fabella is noted.     IMPRESSION:  Impression:  No acute fracture or traumatic malalignment. Mild to moderate degenerative changes of the medial and patellofemoral compartments.     Small knee joint effusion.        Electronically Signed: Santiago Duarte MD    5/17/2024 2:19 PM EDT    Workstation ID: [id number]    I reviewed the above imaging, and agree with findings.    Assessment and Plan     Diagnoses and all orders for this visit:    1. Primary osteoarthritis of left knee (Primary)  -     - Large Joint Arthrocentesis: L knee        1. Primary osteoarthritis of left knee        Reviewed my findings with the patient.  He has left knee arthritis, we discussed options today.  He would like to try an intra-articular injection and this was provided.  I will see him back in 3 months, but I will be happy to see him back sooner for any problems.  Further imaging may be considered in the future if he has continued  mechanical symptoms.    Procedure Note:  The potential benefits of performing a therapeutic left knee joint injection, as well as potential risks (including, but not limited to infection, swelling, pain, bleeding, bruising, nerve/blood vessel damage, skin color changes, transient elevation in blood glucose levels, and fat atrophy) were discussed with the patient.  After informed consent, timeout procedure was performed, and the skin on the left knee was prepped with chlorhexidine soap and alcohol, after which ethyl chloride was applied to the skin at the injection site. Via the anterolateral approach, 1ml of Kenalog 40mg/ml mixed with 4ml 0.5% ropivacaine plain was injected into the knee joint.  The patient tolerated the procedure well, experiencing 90% improvement a few minutes following the injection. There were no complications.  Band-Aid was applied to the injection site. Post-procedural instructions were given to the patient and/or their caregiver.      Return in about 3 months (around 8/29/2024).      Noe Gonzalez MD  05/29/24  11:15 EDT    Dictated Utilizing Dragon Dictation

## 2024-05-30 ENCOUNTER — TREATMENT (OUTPATIENT)
Dept: PHYSICAL THERAPY | Facility: CLINIC | Age: 79
End: 2024-05-30
Payer: MEDICARE

## 2024-05-30 DIAGNOSIS — G89.29 CHRONIC PAIN OF LEFT KNEE: Primary | ICD-10-CM

## 2024-05-30 DIAGNOSIS — M25.562 CHRONIC PAIN OF LEFT KNEE: Primary | ICD-10-CM

## 2024-05-30 PROCEDURE — 97110 THERAPEUTIC EXERCISES: CPT | Performed by: PHYSICAL THERAPIST

## 2024-05-30 PROCEDURE — 97530 THERAPEUTIC ACTIVITIES: CPT | Performed by: PHYSICAL THERAPIST

## 2024-05-30 PROCEDURE — 97112 NEUROMUSCULAR REEDUCATION: CPT | Performed by: PHYSICAL THERAPIST

## 2024-05-30 NOTE — PROGRESS NOTES
"Physical Therapy Daily Treatment Note  [address] [facility], [address]  [place] [address]    Patient: Andrea Rosales Jr.   : 1945  Referring practitioner: Alberto Denis MD  Date of Initial Visit: Type: THERAPY  Noted: 4/15/2024  Today's Date: 2024  Patient seen for 7 sessions       Visit Diagnoses:    ICD-10-CM ICD-9-CM   1. Chronic pain of left knee  M25.562 719.46    G89.29 338.29       Visit #: 7    Subjective   Knee feeling good, and also got injection in knee; MD said arthritis is what is causing my pain    Objective   See Exercise, Manual, and Modality Logs for complete treatment    Full knee mobility      Medbridge Exercises:  Exercises  - Supine Quad Set  - 2 x daily - 7 x weekly - 1 sets - 30 reps - 3\" hold  - Supine Heel Slide with Strap  - 2 x daily - 7 x weekly - 1 sets - 10 reps - 3\" hold  - Supine Active Straight Leg Raise  - 2 x daily - 7 x weekly - 3 sets - 10 reps - 3 hold 2#  - Sidelying Hip Abduction  - 2 x daily - 7 x weekly - 3 sets - 10 reps - 3 hold 2#  - Supine Bridge  - 2 x daily - 7 x weekly - 3 sets - 10 reps - 5\" hold  - Supine Hamstring Stretch  - 2 x daily - 7 x weekly - 2 sets - 10 reps     Prone quad set, prone hip extension, swiss ball bridges, HS curl isometrics, knee flexion self mobs with towel ; TG squats L10 x 6 minutes (defer)  Recumbent bike x 10 minutes at fast pace for strength     Wall squats with green swiss ball (defer)  Lunge to 12\" step alternating leg (defer)    Assessment/Plan  Focused on mobility and NWB exercises today due to recent injection; pain and mobility are improving ; no pain with PT today; continue with PT     Treatment considerations for next visit:  Advance as tolerated     Timed:   Manual Therapy:         mins  76668;     Therapeutic Exercise:    15     mins  56999;     Neuromuscular Carla:    15    mins  59474;    Therapeutic Activity:     15     mins  36451;     Gait Training:           mins  91612;     Ultrasound:          " mins  86735;    Ionto                                   mins   08430  Self Care                            mins   53272  Canalith Repos         mins   05064    Un-Timed:  Electrical Stimulation:         mins  29011 ( );  Dry Needling          mins self-pay  Traction          mins 90465      Timed Treatment:   45   mins   Total Treatment:     45   mins    ARLYN Tolentino, PT  [place] License: [id number]

## 2024-05-31 ENCOUNTER — OFFICE VISIT (OUTPATIENT)
Dept: CARDIOLOGY | Facility: CLINIC | Age: 79
End: 2024-05-31
Payer: MEDICARE

## 2024-05-31 VITALS
DIASTOLIC BLOOD PRESSURE: 70 MMHG | SYSTOLIC BLOOD PRESSURE: 114 MMHG | WEIGHT: 184 LBS | HEART RATE: 73 BPM | BODY MASS INDEX: 24.92 KG/M2 | OXYGEN SATURATION: 97 % | HEIGHT: 72 IN

## 2024-05-31 DIAGNOSIS — E78.2 MIXED HYPERLIPIDEMIA: ICD-10-CM

## 2024-05-31 DIAGNOSIS — R00.2 PALPITATIONS: ICD-10-CM

## 2024-05-31 DIAGNOSIS — I10 PRIMARY HYPERTENSION: Primary | ICD-10-CM

## 2024-05-31 PROCEDURE — 3078F DIAST BP <80 MM HG: CPT | Performed by: INTERNAL MEDICINE

## 2024-05-31 PROCEDURE — 3074F SYST BP LT 130 MM HG: CPT | Performed by: INTERNAL MEDICINE

## 2024-05-31 PROCEDURE — 99214 OFFICE O/P EST MOD 30 MIN: CPT | Performed by: INTERNAL MEDICINE

## 2024-05-31 NOTE — PROGRESS NOTES
"[facility]  Office Progress Note  Andrea Rosales Jr.  1945  [address]  [facility] [address]       Visit Date: 05/31/24    PCP: Ablerto Denis MD  [address]  [facility] [address]    IDENTIFICATION: A 78 y.o. male orig from [place] retired  from [employer] at [employer]. Does financial consulting part time now.     PROBLEM LIST:   History of chest wall pain  Hypertension.    6/16 SE 10 min wnl   Hyperlipidemia.    8/22 97/86/39/41  8/23  [id number]  Palpitations  2016 holter 1.7% pac  Carotid disease  5/30/18 C US 0-49% stenosis bilaterally  Prediabetes  2/22 A1c 5.5  8/23 A1c 5.7  Osteoarthritis.    Prostate cancer status post prostatectomy.    Diverticulosis         CC:   Chief Complaint   Patient presents with    Primary hypertension    Palpitations       Allergies  No Known Allergies    Current Medications  Current Outpatient Medications   Medication Instructions    aspirin 81 mg, Oral, Daily    atorvastatin (LIPITOR) 40 mg, Oral, Daily    Multiple Vitamin (MULTI-VITAMIN DAILY PO) 1 tablet, Oral, Daily    nebivolol (BYSTOLIC) 5 mg, Oral, Daily        History of Present Illness   Andrea Rosales Jr. is a 78 y.o. year old male here for follow up.    Pt denies any chest pain, dyspnea, dyspnea on exertion, orthopnea, PND, palpitations, lower extremity edema, or claudication.  Has had some knee discomfort and therefore not doing as much tenderness.  He notes no symptoms otherwise.  He and his wife are going to [place] to see [employer] later this year    OBJECTIVE:  Vitals:    05/31/24 1457   BP: 114/70   BP Location: Right arm   Patient Position: Sitting   Pulse: 73   SpO2: 97%   Weight: 83.5 kg (184 lb)   Height: 182.9 cm (72\")     Body mass index is 24.95 kg/m².    Constitutional:       Appearance: Healthy appearance. Not in distress.   Neck:      Vascular: No JVR. JVD normal.   Pulmonary:      Effort: Pulmonary effort is " normal.      Breath sounds: Normal breath sounds. No wheezing. No rhonchi. No rales.   Chest:      Chest wall: Not tender to palpatation.   Cardiovascular:      PMI at left midclavicular line. Normal rate. Regular rhythm. Normal S1. Normal S2.       Murmurs: There is no murmur.      No gallop.  No click. No rub.   Pulses:     Intact distal pulses.   Edema:     Peripheral edema absent.   Abdominal:      General: Bowel sounds are normal.      Palpations: Abdomen is soft.      Tenderness: There is no abdominal tenderness.   Musculoskeletal: Normal range of motion.         General: No tenderness. Skin:     General: Skin is warm and dry.   Neurological:      General: No focal deficit present.      Mental Status: Alert and oriented to person, place and time.         Diagnostic Data:  Lab Results   Component Value Date    CHLPL 106 08/14/2023    TRIG 93 08/14/2023    HDL 40 08/14/2023    LDL 48 08/14/2023      Procedures    Advance Care Planning   ACP discussion was held with the patient during this visit. Patient has an advance directive (not in EMR), copy requested.         ASSESSMENT:   Diagnosis Plan   1. Primary hypertension        2. Mixed hyperlipidemia        3. Palpitations            PLAN:  Hypertension controlled now on nebivolol    Palpitations controlled nebivolol    Mixed dyslipidemia controlled on statin therapy        Dann Max MD, FACC

## 2024-06-05 ENCOUNTER — TREATMENT (OUTPATIENT)
Dept: PHYSICAL THERAPY | Facility: CLINIC | Age: 79
End: 2024-06-05
Payer: MEDICARE

## 2024-06-05 DIAGNOSIS — M25.562 CHRONIC PAIN OF LEFT KNEE: Primary | ICD-10-CM

## 2024-06-05 DIAGNOSIS — G89.29 CHRONIC PAIN OF LEFT KNEE: Primary | ICD-10-CM

## 2024-06-05 NOTE — PROGRESS NOTES
"Physical Therapy Daily Treatment Note  [address], [address]  [place] [address]    Patient: Andrea Rosales Jr.   : 1945  Referring practitioner: Alberto Denis MD  Date of Initial Visit: Type: THERAPY  Noted: 4/15/2024  Today's Date: 2024  Patient seen for 8 sessions       Visit Diagnoses:    ICD-10-CM ICD-9-CM   1. Chronic pain of left knee  M25.562 719.46    G89.29 338.29       Visit #: 8    Subjective   Have had a couple of painful steps intermittently, but nothing like I had in [place]; cannot tell that the steroid shot did any good yet  Brought knee sleeve to wear today    Objective   See Exercise, Manual, and Modality Logs for complete treatment    Good knee mobility      Medbridge Exercises:  Exercises  - Supine Quad Set  - 2 x daily - 7 x weekly - 1 sets - 30 reps - 3\" hold  - Supine Heel Slide with Strap  - 2 x daily - 7 x weekly - 1 sets - 10 reps - 3\" hold  - Supine Active Straight Leg Raise  - 2 x daily - 7 x weekly - 3 sets - 10 reps - 3 hold 2#  - Sidelying Hip Abduction  - 2 x daily - 7 x weekly - 3 sets - 10 reps - 3 hold 2#  - Supine Bridge  - 2 x daily - 7 x weekly - 3 sets - 10 reps - 5\" hold  - Supine Hamstring Stretch  - 2 x daily - 7 x weekly - 2 sets - 10 reps     Prone quad set, prone hip extension, swiss ball bridges, HS curl isometrics, knee flexion self mobs with towel ; TG squats L10 x 6 minutes   Recumbent bike x 10 minutes at fast pace for strength     BW squats from low plinth, 2 x 10   Wall squats with green swiss ball (defer)  Lunge to 12\" step alternating leg   Wobble board    Assessment/Plan  Patient needs verbal cues for decreasing pace of CKC activities ; challenged by activities involving balance and control ; needs continued PT to improve functional strength/endurance/balance for ADL's and to return to tennis     Treatment considerations for next visit:  Advance as tolerated     Timed:   Manual Therapy:         mins  73661;     Therapeutic " Exercise:    15     mins  99981;     Neuromuscular Carla:    30    mins  58270;    Therapeutic Activity:     15     mins  74394;     Gait Training:           mins  53255;     Ultrasound:          mins  13911;    Ionto                                   mins   15079  Self Care                            mins   61068  Canalith Repos         mins   49015    Un-Timed:  Electrical Stimulation:         mins  11331 ( );  Dry Needling         mins self-pay  Traction          mins 75586      Timed Treatment:   60   mins   Total Treatment:     75   mins    ARLYN Tolentino, PT  [place] License: [id number]                a

## 2024-06-12 ENCOUNTER — TREATMENT (OUTPATIENT)
Dept: PHYSICAL THERAPY | Facility: CLINIC | Age: 79
End: 2024-06-12
Payer: MEDICARE

## 2024-06-12 DIAGNOSIS — M25.562 CHRONIC PAIN OF LEFT KNEE: Primary | ICD-10-CM

## 2024-06-12 DIAGNOSIS — G89.29 CHRONIC PAIN OF LEFT KNEE: Primary | ICD-10-CM

## 2024-06-12 NOTE — PROGRESS NOTES
"Physical Therapy Daily Treatment Note  [address], [address]  [place] [address]    Patient: Andrea Rosales Jr.   : 1945  Referring practitioner: Alberto Denis MD  Date of Initial Visit: Type: THERAPY  Noted: 4/15/2024  Today's Date: 2024  Patient seen for 9 sessions       Visit Diagnoses:    ICD-10-CM ICD-9-CM   1. Chronic pain of left knee  M25.562 719.46    G89.29 338.29       Visit #: 9    Subjective   Hit some tennis balls off a wall, and then played in a doubles match on Monday; felt ok while playing, there were some balls he didn't try to get to because of knee; was a little sore yesterday and today after playing  Can't tell any difference from Cortizone shot  Objective   See Exercise, Manual, and Modality Logs for complete treatment    Full knee mobility     Medbridge Exercises:  Exercises  - Supine Quad Set  - 2 x daily - 7 x weekly - 1 sets - 30 reps - 3\" hold  - Supine Heel Slide with Strap  - 2 x daily - 7 x weekly - 1 sets - 10 reps - 3\" hold  - Supine Active Straight Leg Raise  - 2 x daily - 7 x weekly - 3 sets - 10 reps - 3 hold 2#  - Sidelying Hip Abduction  - 2 x daily - 7 x weekly - 3 sets - 10 reps - 3 hold 2#  - Supine Bridge  - 2 x daily - 7 x weekly - 3 sets - 10 reps - 5\" hold  - Supine Hamstring Stretch  - 2 x daily - 7 x weekly - 2 sets - 10 reps     Prone quad set, prone hip extension, swiss ball bridges, HS curl isometrics, knee flexion self mobs with towel ; TG squats L10 x 6 minutes   Recumbent bike x 10 minutes at fast pace for strength     BW squats from low plinth, 2 x 10   Wall squats with green swiss ball (defer)  Lunge to 12\" step alternating leg   Wobble board    Assessment/Plan  Patient needs continued skilled Physical Therapy services for decreasing pain and improving mobility, strength, balance and endurance in order to return to work and activities of daily living without pain or dysfunction    Treatment considerations for next visit:  Advance " as tolerated     Timed:   Manual Therapy:         mins  52364;     Therapeutic Exercise:    15     mins  71833;     Neuromuscular Carla:    30    mins  36007;    Therapeutic Activity:     15     mins  11017;     Gait Training:           mins  49893;     Ultrasound:          mins  47107;    Ionto                                   mins   41647  Self Care                            mins   36382  Canalith Repos         mins   21858    Un-Timed:  Electrical Stimulation:         mins  63774 ( );  Dry Needling          mins self-pay  Traction          mins 41526      Timed Treatment:   60   mins   Total Treatment:     75   mins    ARLYN Tolentino, PT  [place] License: [id number]

## 2024-06-25 ENCOUNTER — TREATMENT (OUTPATIENT)
Dept: PHYSICAL THERAPY | Facility: CLINIC | Age: 79
End: 2024-06-25
Payer: MEDICARE

## 2024-06-25 DIAGNOSIS — G89.29 CHRONIC PAIN OF LEFT KNEE: Primary | ICD-10-CM

## 2024-06-25 DIAGNOSIS — M25.562 CHRONIC PAIN OF LEFT KNEE: Primary | ICD-10-CM

## 2024-06-25 NOTE — PROGRESS NOTES
"Physical Therapy Daily Treatment Note/ Re-assessment   [address], [address]  [place] [address]    Patient: Andrea Rosales Jr.   : 1945  Referring practitioner: Alberto Denis MD  Date of Initial Visit: Type: THERAPY  Noted: 4/15/2024  Today's Date: 2024  Patient seen for 10 sessions       Visit Diagnoses:    ICD-10-CM ICD-9-CM   1. Chronic pain of left knee  M25.562 719.46    G89.29 338.29       Visit #: 10    Subjective   Played some tennis, did not go too hard; not really any pain with it; still having swelling that won't go away  Knee brace helps     Objective   See Exercise, Manual, and Modality Logs for complete treatment    Left knee flexion: 135 deg, right knee 145 deg  + ballotment test left knee  Moderate edema : SP of patella 39 cm left, 37 right         Medbridge Exercises:  Exercises  - Supine Quad Set  - 2 x daily - 7 x weekly - 1 sets - 30 reps - 3\" hold  - Supine Heel Slide with Strap  - 2 x daily - 7 x weekly - 1 sets - 10 reps - 3\" hold  - Supine Active Straight Leg Raise  - 2 x daily - 7 x weekly - 3 sets - 10 reps - 3 hold 2#  - Sidelying Hip Abduction  - 2 x daily - 7 x weekly - 3 sets - 10 reps - 3 hold 2#  - Supine Bridge  - 2 x daily - 7 x weekly - 3 sets - 10 reps - 5\" hold  - Supine Hamstring Stretch  - 2 x daily - 7 x weekly - 2 sets - 10 reps     Prone quad set, prone hip extension, swiss ball bridges, HS curl isometrics, knee flexion self mobs with towel   Recumbent bike x 10 minutes at fast pace for strength       Assessment/Plan  Patient is progressing well with mobility and strength; needs continued PT to meet goals; increased edema today likely due to playing tennis recently; less tenderness posteriorly      Goals  Plan Goals: 3 weeks  1.  Patient to be independent with home exercise program (met)   2.  Patient to display full knee range of motion without pain for ADLs (progressing)   3.  Patient to improve manual muscle test strength to 5 out of 5 " left lower extremity (progressing)      6 weeks  1.Patient to improve LEFS score by 1M FARIBA (not met)   2.  Patient to perform up to 60 minutes of tennis specific exercise and mobility training without a significant increase in pain so that he can return to playing tennis (progressing)   3.  Patient to improve hamstring flexibility with 9090 testing by 50% (not met)      Treatment considerations for next visit:  Advance as tolerated   Continue 1-2 x week for 6 more weeks    Timed:   Manual Therapy:    10     mins  10931;     Therapeutic Exercise:    20     mins  20423;     Neuromuscular Carla:    15    mins  26667;    Therapeutic Activity:     15     mins  67822;     Gait Training:           mins  48181;     Ultrasound:          mins  45396;    Ionto                                   mins   17131  Self Care                            mins   58056  Canalith Repos         mins   41821    Un-Timed:  Electrical Stimulation:         mins  80334 ( );  Dry Needling          mins self-pay  Traction          mins 00120      Timed Treatment:   60   mins   Total Treatment:     75   mins    ARLYN Tolentino, PT  [place] License: [id number]

## 2024-07-08 ENCOUNTER — TELEPHONE (OUTPATIENT)
Dept: PHYSICAL THERAPY | Facility: CLINIC | Age: 79
End: 2024-07-08
Payer: MEDICARE

## 2024-07-08 NOTE — TELEPHONE ENCOUNTER
"  Caller: Andrea Rosales Jr. \"Bill\"    Relationship: Self    Best call back number: [phone]         What was the call regarding: WOULD LIKE TO TRY AND SEE KEENAN THIS WEEK, IF YOU CAN SCHEDULE         "

## 2024-07-10 ENCOUNTER — OFFICE VISIT (OUTPATIENT)
Dept: ORTHOPEDIC SURGERY | Facility: CLINIC | Age: 79
End: 2024-07-10
Payer: MEDICARE

## 2024-07-10 VITALS
SYSTOLIC BLOOD PRESSURE: 120 MMHG | WEIGHT: 182 LBS | BODY MASS INDEX: 24.65 KG/M2 | HEIGHT: 72 IN | DIASTOLIC BLOOD PRESSURE: 82 MMHG

## 2024-07-10 DIAGNOSIS — M17.12 PRIMARY OSTEOARTHRITIS OF LEFT KNEE: Primary | ICD-10-CM

## 2024-07-10 PROCEDURE — 1160F RVW MEDS BY RX/DR IN RCRD: CPT | Performed by: ORTHOPAEDIC SURGERY

## 2024-07-10 PROCEDURE — 99212 OFFICE O/P EST SF 10 MIN: CPT | Performed by: ORTHOPAEDIC SURGERY

## 2024-07-10 PROCEDURE — 3074F SYST BP LT 130 MM HG: CPT | Performed by: ORTHOPAEDIC SURGERY

## 2024-07-10 PROCEDURE — 1159F MED LIST DOCD IN RCRD: CPT | Performed by: ORTHOPAEDIC SURGERY

## 2024-07-10 PROCEDURE — 3079F DIAST BP 80-89 MM HG: CPT | Performed by: ORTHOPAEDIC SURGERY

## 2024-07-10 RX ORDER — BRIMONIDINE TARTRATE 1.5 MG/ML
SOLUTION/ DROPS OPHTHALMIC
COMMUNITY
Start: 2024-06-24

## 2024-07-10 NOTE — PROGRESS NOTES
[facility] [place]    Subjective     Chief Complaint   Patient presents with    Follow-up     6 week follow up -- Primary osteoarthritis of left knee        HPI    It has been 6  week(s) since Mr. Rosales's last visit. He returns to clinic today for follow-up of left knee arthritis. The issue has been ongoing for 8 week(s). He rates his pain a 1/10 on the pain scale. Previous/current treatments: bracing and physical therapy. Current symptoms: pain and swelling. The pain is worse with  after tennis  ; resting and ice improve the pain. Overall, he is doing the same.  He had some questions about his knee.  He is having no pain currently.  He has been doing physical therapy.  Intermittent swelling.    I have reviewed the following portions of the patient's history and agree with: History of Present Illness and Review of Systems    Patient Active Problem List   Diagnosis    Hyperlipidemia    Osteoarthritis    Palpitations    Prediabetes    Hypercholesterolemia    Primary hypertension    History of prostate cancer    Annual physical exam    Premature atrial contraction    Localized, secondary osteoarthritis of the shoulder region, right    Viral upper respiratory tract infection    Sore throat    Left knee pain     Past Medical History:   Diagnosis Date    Arthritis Shoulder, 2019    Benign essential hypertension     Cataract Forming, 2018/19    Diverticulosis Diverticulosis, 9/2019    GERD (gastroesophageal reflux disease) Appeared to have GERD about 3 years ago    History of colonic polyps     History of prostate cancer     HL (hearing loss) For many years, hearing aid 10,2019, no difference    Hypercholesterolemia     Prediabetes     Prostate cancer     S/P PROSTATECTOMY    Right-sided back pain     Visual impairment Driving at night, outside city      Past Surgical History:   Procedure Laterality Date    COLONOSCOPY  09/2019    PROSTATECTOMY  1998    Prostate cancer-Radical     TONSILLECTOMY         Family History   Problem Relation Age of Onset    Breast cancer Mother     Cancer Mother         Breast cancer, then spread    Stroke Father         Cause of death    Stomach cancer Father         Gastric    Alcohol abuse Father         But not out of hand    Cancer Father         Bladder    Breast cancer Sister     Cancer Sister         Breast cancer, then spread     Social History     Socioeconomic History    Marital status:    Tobacco Use    Smoking status: Never    Smokeless tobacco: Never   Vaping Use    Vaping status: Never Used   Substance and Sexual Activity    Alcohol use: Yes     Comment: Only  drink alcoholic beverages occasionally    Drug use: No    Sexual activity: Not Currently     Partners: Female     Birth control/protection: Other     Comment: Prostate extracted      Current Outpatient Medications on File Prior to Visit   Medication Sig Dispense Refill    aspirin 81 MG EC tablet Take 1 tablet by mouth Daily.      atorvastatin (LIPITOR) 40 MG tablet TAKE 1 TABLET BY MOUTH DAILY 90 tablet 0    brimonidine (ALPHAGAN) 0.15 % ophthalmic solution INSTILL 1 DROP INTO BOTH EYES THREE TIMES DAILY      Multiple Vitamin (MULTI-VITAMIN DAILY PO) Take 1 tablet by mouth Daily.      nebivolol (BYSTOLIC) 5 MG tablet Take 1 tablet by mouth Daily. 90 tablet 3     No current facility-administered medications on file prior to visit.      No Known Allergies     Review of Systems   Constitutional:  Negative for activity change, appetite change, chills, diaphoresis, fatigue, fever and unexpected weight change.   HENT:  Negative for congestion, dental problem, drooling, ear discharge, ear pain, facial swelling, hearing loss, mouth sores, nosebleeds, postnasal drip, rhinorrhea, sinus pressure, sneezing, sore throat, tinnitus, trouble swallowing and voice change.    Eyes:  Negative for photophobia, pain, discharge, redness, itching and visual disturbance.   Respiratory:  Negative for apnea, cough, choking, chest  "tightness, shortness of breath, wheezing and stridor.    Cardiovascular:  Negative for chest pain, palpitations and leg swelling.   Gastrointestinal:  Negative for abdominal distention, abdominal pain, anal bleeding, blood in stool, constipation, diarrhea, nausea, rectal pain and vomiting.   Endocrine: Negative for cold intolerance, heat intolerance, polydipsia, polyphagia and polyuria.   Genitourinary:  Negative for decreased urine volume, difficulty urinating, dysuria, enuresis, flank pain, frequency, genital sores, hematuria and urgency.   Musculoskeletal:  Positive for arthralgias. Negative for back pain, gait problem, joint swelling, myalgias, neck pain and neck stiffness.   Skin:  Negative for color change, pallor, rash and wound.   Allergic/Immunologic: Negative for environmental allergies, food allergies and immunocompromised state.   Neurological:  Negative for dizziness, tremors, seizures, syncope, facial asymmetry, speech difficulty, weakness, light-headedness, numbness and headaches.   Hematological:  Negative for adenopathy. Does not bruise/bleed easily.   Psychiatric/Behavioral:  Negative for agitation, behavioral problems, confusion, decreased concentration, dysphoric mood, hallucinations, self-injury, sleep disturbance and suicidal ideas. The patient is not nervous/anxious and is not hyperactive.         Objective      Physical Exam  /82   Ht 182.9 cm (72.01\")   Wt 82.6 kg (182 lb)   BMI 24.68 kg/m²     Body mass index is 24.68 kg/m².  BMI is within normal parameters. No other follow-up for BMI required.      General:   Mental Status:  Alert   Appearance: Cooperative, in no acute distress   Build and Nutrition: Well-nourished well-developed male   Orientation: Alert and oriented to person, place and time   Posture: Normal   Gait: Nonantalgic/normal    Integument:              Left knee: No skin lesions, no rash, no ecchymosis     Lower Extremities:              Left Knee:                     "      Tenderness:    None                          Effusion:          1+                          Swelling:          None                          Crepitus:          Positive                          Atrophy:           None                          Range of motion:        Extension:       0°                                                              Flexion:           125°  Instability:        No varus laxity, no valgus laxity, negative anterior drawer  Deformities:     None    Imaging/Studies  Imaging Results (Last 24 Hours)       ** No results found for the last 24 hours. **          No new imaging today.    Assessment and Plan     Diagnoses and all orders for this visit:    1. Primary osteoarthritis of left knee (Primary)        1. Primary osteoarthritis of left knee        I reviewed my findings with the patient.  Still not experiencing mechanical symptoms.  Intermittent effusions.  This is all secondary to the arthritis in his knee.  Symptoms controlled currently with his current regimen including intermittent anti-inflammatory use and physical therapy.  I will see him back as planned in September, but I will be happy to see him back sooner for any problems.  His questions were answered today.    Return for at regularly scheduled appointment.      Noe Gonzalez MD  07/10/24  11:49 EDT    Dictated Utilizing Dragon Dictation

## 2024-07-11 ENCOUNTER — TREATMENT (OUTPATIENT)
Dept: PHYSICAL THERAPY | Facility: CLINIC | Age: 79
End: 2024-07-11
Payer: MEDICARE

## 2024-07-11 DIAGNOSIS — G89.29 CHRONIC PAIN OF LEFT KNEE: Primary | ICD-10-CM

## 2024-07-11 DIAGNOSIS — M25.562 CHRONIC PAIN OF LEFT KNEE: Primary | ICD-10-CM

## 2024-07-11 NOTE — PROGRESS NOTES
"1Physical Therapy Daily Treatment Note  [address], [address]  [place] [address]    Patient: Andrea Rosales Jr.   : 1945  Referring practitioner: Alberto Denis MD  Date of Initial Visit: Type: THERAPY  Noted: 4/15/2024  Today's Date: 2024  Patient seen for 11 sessions       Visit Diagnoses:    ICD-10-CM ICD-9-CM   1. Chronic pain of left knee  M25.562 719.46    G89.29 338.29       Visit #: 11    Subjective   Knee has been feeling better, not as much swelling; wearing the sleeve more and not playing tennis    Objective   See Exercise, Manual, and Modality Logs for complete treatment    38 cm left knee SP pole of patella (improved 1 cm)   135 deg knee flexion    Added: banded box steps with black band      Medbridge Exercises:  Exercises  - Supine Quad Set  - 2 x daily - 7 x weekly - 1 sets - 30 reps - 3\" hold  - Supine Heel Slide with Strap  - 2 x daily - 7 x weekly - 1 sets - 10 reps - 3\" hold  - Supine Active Straight Leg Raise  - 2 x daily - 7 x weekly - 3 sets - 10 reps - 3 hold 2#  - Sidelying Hip Abduction  - 2 x daily - 7 x weekly - 3 sets - 10 reps - 3 hold 2#  - Supine Bridge  - 2 x daily - 7 x weekly - 3 sets - 10 reps - 5\" hold  - Supine Hamstring Stretch  - 2 x daily - 7 x weekly - 2 sets - 10 reps     Prone quad set, prone hip extension, swiss ball bridges, HS curl isometrics, knee flexion self mobs with towel   Recumbent bike x 10 minutes at fast pace for strength    Assessment/Plan  Improved motion and edema; needs continued strength and mobility of the knee; sleeve seems to help keep edema down     Treatment considerations for next visit:  Advance as tolerated     Timed:   Manual Therapy:         mins  43232;     Therapeutic Exercise:    25     mins  86852;     Neuromuscular Carla:    15    mins  92048;    Therapeutic Activity:     15     mins  61597;     Gait Training:           mins  13603;     Ultrasound:          mins  73049;    Ionto                               "     mins   03287  Self Care                            mins   71269  Canalith Repos         mins   36891    Un-Timed:  Electrical Stimulation:         mins  14094 ( );  Dry Needling          mins self-pay  Traction          mins 01004      Timed Treatment:   55   mins   Total Treatment:     65   mins    ARLYN Tolentino, PT  [place] License: [id number]

## 2024-07-25 ENCOUNTER — TREATMENT (OUTPATIENT)
Dept: PHYSICAL THERAPY | Facility: CLINIC | Age: 79
End: 2024-07-25
Payer: MEDICARE

## 2024-07-25 DIAGNOSIS — M25.562 CHRONIC PAIN OF LEFT KNEE: Primary | ICD-10-CM

## 2024-07-25 DIAGNOSIS — G89.29 CHRONIC PAIN OF LEFT KNEE: Primary | ICD-10-CM

## 2024-07-25 NOTE — PROGRESS NOTES
"Physical Therapy Daily Treatment Note  [facility]    Patient: Andrea Rosales Jr.   : 1945  Referring practitioner: Alberto Denis MD  Date of Initial Visit: Type: THERAPY  Noted: 4/15/2024  Today's Date: 2024  Patient seen for 12 sessions       Visit Diagnoses:    ICD-10-CM ICD-9-CM   1. Chronic pain of left knee  M25.562 719.46    G89.29 338.29       Visit #: 12    Subjective   Knee continues to feel better, swelling continues to be less; have not played tennis since last visit    Objective   See Exercise, Manual, and Modality Logs for complete treatment    Very minimal edema noted today    Added: banded box steps with black band      [employer] Exercises:  Exercises  - Supine Quad Set  - 2 x daily - 7 x weekly - 1 sets - 30 reps - 3\" hold  - Supine Heel Slide with Strap  - 2 x daily - 7 x weekly - 1 sets - 10 reps - 3\" hold  - Supine Active Straight Leg Raise  - 2 x daily - 7 x weekly - 3 sets - 10 reps - 3 hold 3#  - Sidelying Hip Abduction  - 2 x daily - 7 x weekly - 3 sets - 10 reps - 3 hold 3#  - Supine Bridge  - 2 x daily - 7 x weekly - 3 sets - 10 reps - 5\" hold  - Supine Hamstring Stretch  - 2 x daily - 7 x weekly - 2 sets - 10 reps     Prone quad set, prone hip extension, swiss ball bridges, HS curl isometrics, knee flexion self mobs with towel   Recumbent bike x 10 minutes at fast pace for strength  Added: swiss ball bridge with SLR  Banded side steps  Lunge to a step  Prone hip extension 3#    Assessment/Plan  Patient needs continued skilled Physical Therapy services for decreasing pain and improving mobility, strength, balance and endurance in order to return to work and activities of daily living without pain or dysfunction    Treatment considerations for next visit:  Advance as tolerated; continue to advance low tolerance in tennis simulated activities in order to return to full tennis    Timed:   Manual Therapy:         mins  22328;     Therapeutic Exercise:    25     mins "  06470;     Neuromuscular Carla:    15    mins  39216;    Therapeutic Activity:     15     mins  51192;     Gait Training:           mins  25142;     Ultrasound:          mins  52299;    Ionto                                   mins   57722  Self Care                            mins   60837  Canalith Repos         mins   03664    Un-Timed:  Electrical Stimulation:         mins  56904 ( );  Dry Needling          mins self-pay  Traction          mins 53459      Timed Treatment:   55   mins   Total Treatment:     55   mins    ARLYN Tolentino, PT  [place] License: [id number]

## 2024-08-16 ENCOUNTER — OFFICE VISIT (OUTPATIENT)
Dept: INTERNAL MEDICINE | Facility: CLINIC | Age: 79
End: 2024-08-16
Payer: MEDICARE

## 2024-08-16 ENCOUNTER — LAB (OUTPATIENT)
Dept: LAB | Facility: HOSPITAL | Age: 79
End: 2024-08-16
Payer: MEDICARE

## 2024-08-16 VITALS
SYSTOLIC BLOOD PRESSURE: 98 MMHG | HEIGHT: 72 IN | HEART RATE: 84 BPM | WEIGHT: 185.8 LBS | BODY MASS INDEX: 25.17 KG/M2 | TEMPERATURE: 98 F | DIASTOLIC BLOOD PRESSURE: 72 MMHG

## 2024-08-16 DIAGNOSIS — R73.03 PREDIABETES: ICD-10-CM

## 2024-08-16 DIAGNOSIS — I10 PRIMARY HYPERTENSION: ICD-10-CM

## 2024-08-16 DIAGNOSIS — Z00.00 ANNUAL PHYSICAL EXAM: Primary | ICD-10-CM

## 2024-08-16 DIAGNOSIS — E78.2 MIXED HYPERLIPIDEMIA: ICD-10-CM

## 2024-08-16 DIAGNOSIS — Z85.46 HISTORY OF PROSTATE CANCER: ICD-10-CM

## 2024-08-16 DIAGNOSIS — Z12.11 COLON CANCER SCREENING: ICD-10-CM

## 2024-08-16 PROBLEM — J06.9 VIRAL UPPER RESPIRATORY TRACT INFECTION: Status: RESOLVED | Noted: 2023-12-01 | Resolved: 2024-08-16

## 2024-08-16 PROBLEM — J02.9 SORE THROAT: Status: RESOLVED | Noted: 2023-12-01 | Resolved: 2024-08-16

## 2024-08-16 LAB
ALBUMIN SERPL-MCNC: 4.4 G/DL (ref 3.5–5.2)
ALBUMIN/GLOB SERPL: 1.5 G/DL
ALP SERPL-CCNC: 92 U/L (ref 39–117)
ALT SERPL W P-5'-P-CCNC: 36 U/L (ref 1–41)
ANION GAP SERPL CALCULATED.3IONS-SCNC: 9.8 MMOL/L (ref 5–15)
AST SERPL-CCNC: 36 U/L (ref 1–40)
BILIRUB SERPL-MCNC: 0.7 MG/DL (ref 0–1.2)
BUN SERPL-MCNC: 17 MG/DL (ref 8–23)
BUN/CREAT SERPL: 16 (ref 7–25)
CALCIUM SPEC-SCNC: 10.2 MG/DL (ref 8.6–10.5)
CHLORIDE SERPL-SCNC: 107 MMOL/L (ref 98–107)
CHOLEST SERPL-MCNC: 110 MG/DL (ref 0–200)
CO2 SERPL-SCNC: 26.2 MMOL/L (ref 22–29)
CREAT SERPL-MCNC: 1.06 MG/DL (ref 0.76–1.27)
EGFRCR SERPLBLD CKD-EPI 2021: 71.4 ML/MIN/1.73
GLOBULIN UR ELPH-MCNC: 2.9 GM/DL
GLUCOSE SERPL-MCNC: 109 MG/DL (ref 65–99)
HBA1C MFR BLD: 5.5 % (ref 4.8–5.6)
HDLC SERPL-MCNC: 36 MG/DL (ref 40–60)
LDLC SERPL CALC-MCNC: 60 MG/DL (ref 0–100)
LDLC/HDLC SERPL: 1.69 {RATIO}
POTASSIUM SERPL-SCNC: 4.8 MMOL/L (ref 3.5–5.2)
PROT SERPL-MCNC: 7.3 G/DL (ref 6–8.5)
SODIUM SERPL-SCNC: 143 MMOL/L (ref 136–145)
TRIGL SERPL-MCNC: 66 MG/DL (ref 0–150)
VLDLC SERPL-MCNC: 14 MG/DL (ref 5–40)

## 2024-08-16 PROCEDURE — 99397 PER PM REEVAL EST PAT 65+ YR: CPT | Performed by: INTERNAL MEDICINE

## 2024-08-16 PROCEDURE — 1160F RVW MEDS BY RX/DR IN RCRD: CPT | Performed by: INTERNAL MEDICINE

## 2024-08-16 PROCEDURE — 1159F MED LIST DOCD IN RCRD: CPT | Performed by: INTERNAL MEDICINE

## 2024-08-16 PROCEDURE — 80061 LIPID PANEL: CPT

## 2024-08-16 PROCEDURE — 80053 COMPREHEN METABOLIC PANEL: CPT

## 2024-08-16 PROCEDURE — 83036 HEMOGLOBIN GLYCOSYLATED A1C: CPT

## 2024-08-16 PROCEDURE — 85025 COMPLETE CBC W/AUTO DIFF WBC: CPT

## 2024-08-16 PROCEDURE — 82043 UR ALBUMIN QUANTITATIVE: CPT

## 2024-08-16 PROCEDURE — G0439 PPPS, SUBSEQ VISIT: HCPCS | Performed by: INTERNAL MEDICINE

## 2024-08-16 PROCEDURE — 3078F DIAST BP <80 MM HG: CPT | Performed by: INTERNAL MEDICINE

## 2024-08-16 PROCEDURE — 3074F SYST BP LT 130 MM HG: CPT | Performed by: INTERNAL MEDICINE

## 2024-08-16 PROCEDURE — 82570 ASSAY OF URINE CREATININE: CPT

## 2024-08-16 PROCEDURE — 1126F AMNT PAIN NOTED NONE PRSNT: CPT | Performed by: INTERNAL MEDICINE

## 2024-08-16 RX ORDER — DORZOLAMIDE HYDROCHLORIDE AND TIMOLOL MALEATE 20; 5 MG/ML; MG/ML
1 SOLUTION/ DROPS OPHTHALMIC 2 TIMES DAILY
COMMUNITY
Start: 2024-07-16

## 2024-08-16 NOTE — PROGRESS NOTES
Subjective   The ABCs of the Annual Wellness Visit  Medicare Wellness Visit      Andrea Rosales Jr. is a 78 y.o. patient who presents for a Medicare Wellness Visit.    The following portions of the patient's history were reviewed and   updated as appropriate: allergies, current medications, past family history, past medical history, past social history, past surgical history, and problem list.    Compared to one year ago, the patient's physical   health is the same.  Compared to one year ago, the patient's mental   health is the same.    Recent Hospitalizations:  He was not admitted to the hospital during the last year.     Current Medical Providers:  Patient Care Team:  Alberto Denis MD as PCP - General  Alberto Denis MD as PCP - Family Medicine  Dann Max MD as Consulting Physician (Cardiology)    Outpatient Medications Prior to Visit   Medication Sig Dispense Refill    aspirin 81 MG EC tablet Take 1 tablet by mouth Daily.      atorvastatin (LIPITOR) 40 MG tablet TAKE 1 TABLET BY MOUTH DAILY 90 tablet 0    brimonidine (ALPHAGAN) 0.15 % ophthalmic solution INSTILL 1 DROP INTO BOTH EYES THREE TIMES DAILY      dorzolamide-timolol (COSOPT) 2-0.5 % ophthalmic solution Administer 1 drop into the left eye 2 (Two) Times a Day.      Multiple Vitamin (MULTI-VITAMIN DAILY PO) Take 1 tablet by mouth Daily.      nebivolol (BYSTOLIC) 5 MG tablet Take 1 tablet by mouth Daily. 90 tablet 3     No facility-administered medications prior to visit.     No opioid medication identified on active medication list. I have reviewed chart for other potential  high risk medication/s and harmful drug interactions in the elderly.      Aspirin is on active medication list. Aspirin use is indicated based on review of current medical condition/s. Pros and cons of this therapy have been discussed today. Benefits of this medication outweigh potential harm.  Patient has been encouraged to continue taking this medication.  " .      Patient Active Problem List   Diagnosis    Hyperlipidemia    Osteoarthritis    Prediabetes    Primary hypertension    History of prostate cancer    Annual physical exam    Premature atrial contraction    Localized, secondary osteoarthritis of the shoulder region, right    Left knee pain     Advance Care Planning Advance Directive is not on file.  ACP discussion was held with the patient during this visit. Patient has an advance directive (not in EMR), copy requested.            Objective   Vitals:    24 1019   BP: 98/72   BP Location: Left arm   Patient Position: Sitting   Cuff Size: Adult   Pulse: 84   Temp: 98 °F (36.7 °C)   TempSrc: Infrared   Weight: 84.3 kg (185 lb 12.8 oz)   Height: 182.9 cm (72.01\")   PainSc: 0-No pain       Estimated body mass index is 25.19 kg/m² as calculated from the following:    Height as of this encounter: 182.9 cm (72.01\").    Weight as of this encounter: 84.3 kg (185 lb 12.8 oz).            Does the patient have evidence of cognitive impairment? No  Lab Results   Component Value Date    TRIG 66 2024    HDL 36 (L) 2024    LDL 60 2024    VLDL 14 2024    HGBA1C 5.50 2024                                                                                                Health  Risk Assessment    Smoking Status:  Social History     Tobacco Use   Smoking Status Never   Smokeless Tobacco Never     Alcohol Consumption:  Social History     Substance and Sexual Activity   Alcohol Use Yes    Comment: Only  drink alcoholic beverages occasionally       Fall Risk Screen  STEADI Fall Risk Assessment was completed, and patient is at LOW risk for falls.Assessment completed on:2024    Depression Screenin/16/2024    10:27 AM   PHQ-2/PHQ-9 Depression Screening   Little Interest or Pleasure in Doing Things 0-->not at all   Feeling Down, Depressed or Hopeless 0-->not at all   PHQ-9: Brief Depression Severity Measure Score 0     Health Habits and " Functional and Cognitive Screenin/16/2024    10:24 AM   Functional & Cognitive Status   Do you have difficulty preparing food and eating? No   Do you have difficulty bathing yourself, getting dressed or grooming yourself? No   Do you have difficulty using the toilet? No   Do you have difficulty moving around from place to place? No   Do you have trouble with steps or getting out of a bed or a chair? No   Current Diet Well Balanced Diet   Dental Exam Up to date   Eye Exam Up to date   Exercise (times per week) 7 times per week   Current Exercises Include Walking;Light Weights   Do you need help using the phone?  No   Are you deaf or do you have serious difficulty hearing?  Yes   Do you need help to go to places out of walking distance? No   Do you need help shopping? No   Do you need help preparing meals?  No   Do you need help with housework?  No   Do you need help with laundry? No   Do you need help taking your medications? No   Do you need help managing money? No   Do you ever drive or ride in a car without wearing a seat belt? No   Have you felt unusual stress, anger or loneliness in the last month? No   Who do you live with? Spouse   If you need help, do you have trouble finding someone available to you? No   Have you been bothered in the last four weeks by sexual problems? No   Do you have difficulty concentrating, remembering or making decisions? No           Age-appropriate Screening Schedule:  Refer to the list below for future screening recommendations based on patient's age, sex and/or medical conditions. Orders for these recommended tests are listed in the plan section. The patient has been provided with a written plan.    Health Maintenance List  Health Maintenance   Topic Date Due    ZOSTER VACCINE (1 of 2) Never done    RSV Vaccine - Adults (1 - 1-dose 60+ series) Never done    COVID-19 Vaccine (2023- season) 2024    ANNUAL WELLNESS VISIT  2024    INFLUENZA VACCINE   08/01/2024    COLORECTAL CANCER SCREENING  09/10/2024    BMI FOLLOWUP  04/04/2025    LIPID PANEL  08/16/2025    TDAP/TD VACCINES (2 - Td or Tdap) 03/16/2032    HEPATITIS C SCREENING  Completed    Pneumococcal Vaccine 65+  Completed                                                                                                                                                CMS Preventative Services Quick Reference  Risk Factors Identified During Encounter  None Identified    The above risks/problems have been discussed with the patient.  Pertinent information has been shared with the patient in the After Visit Summary.  An After Visit Summary and PPPS were made available to the patient.    Follow Up:   Next Medicare Wellness visit to be scheduled in 1 year.         Additional E&M Note during same encounter follows:  Patient has additional, significant, and separately identifiable condition(s)/problem(s) that require work above and beyond the Medicare Wellness Visit     Chief Complaint  Medicare Wellness-subsequent    Subjective    HPI  Bill is also being seen today for an annual adult preventative physical exam.        The patient is a 78-year-old male who comes in for a subsequent Medicare annual wellness examination and for follow-up of hypertension, hyperlipidemia, and prediabetes.    He has been experiencing knee discomfort since mid-March 2024, which has been gradually improving. Despite the discomfort, he managed to play tennis twice without any issues, although he noticed some swelling in his knee post-play. The swelling has since subsided. He has an appointment with Dr. Holley on 09/03/2024, who suspects arthritis. An MRI has been suggested for further evaluation. He had a meniscus tear in 2015, which was managed conservatively with exercises to strengthen his leg. He has been attending physical therapy sessions weekly, except for a few missed weeks due to travel. His last session was two weeks ago, and he  "was informed that he might be nearing the end of his physical therapy course. He continues to perform the prescribed exercises at home daily.    He received a letter indicating that it's time for his next colonoscopy. He recalls having complications during his last colonoscopy, which necessitated an emergency room visit. He is considering returning to Dr. Woods for his upcoming procedure. He maintains a healthy lifestyle, including regular exercise and minimal alcohol consumption. He reports no lightheadedness or dizziness. He is currently fasting and woke up around 4:00 AM, unable to return to sleep.    His blood pressure reading today is slightly low. A few weeks ago, it was recorded as 120/71.        No opioid medication identified on active medication list. I have reviewed chart for other potential  high risk medication/s and harmful drug interactions in the elderly.        Objective   Vital Signs:  BP 98/72 (BP Location: Left arm, Patient Position: Sitting, Cuff Size: Adult)   Pulse 84   Temp 98 °F (36.7 °C) (Infrared)   Ht 182.9 cm (72.01\")   Wt 84.3 kg (185 lb 12.8 oz)   BMI 25.19 kg/m²   Physical Exam  Vitals and nursing note reviewed.   Constitutional:       Appearance: Normal appearance. He is well-developed.   HENT:      Head: Normocephalic and atraumatic.      Right Ear: Tympanic membrane, ear canal and external ear normal.      Left Ear: Tympanic membrane, ear canal and external ear normal.      Nose: Nose normal.      Mouth/Throat:      Pharynx: Oropharynx is clear. No oropharyngeal exudate or posterior oropharyngeal erythema.   Eyes:      Extraocular Movements: Extraocular movements intact.      Conjunctiva/sclera: Conjunctivae normal.      Pupils: Pupils are equal, round, and reactive to light.   Neck:      Thyroid: No thyromegaly.      Vascular: No carotid bruit or JVD.   Cardiovascular:      Rate and Rhythm: Normal rate and regular rhythm.      Heart sounds: Normal heart sounds. No murmur " heard.     No friction rub. No gallop.   Pulmonary:      Effort: Pulmonary effort is normal. No respiratory distress.      Breath sounds: Normal breath sounds. No wheezing or rales.   Chest:      Chest wall: No tenderness.   Abdominal:      General: Bowel sounds are normal. There is no distension.      Palpations: Abdomen is soft. There is no mass.      Tenderness: There is no abdominal tenderness. There is no guarding or rebound.      Hernia: No hernia is present.   Musculoskeletal:         General: No tenderness. Normal range of motion.      Cervical back: Normal range of motion and neck supple.      Right lower leg: No edema.      Left lower leg: No edema.   Lymphadenopathy:      Cervical: No cervical adenopathy.   Skin:     General: Skin is warm and dry.      Findings: No erythema or rash.   Neurological:      Mental Status: He is alert and oriented to person, place, and time.      Cranial Nerves: No cranial nerve deficit.      Motor: No weakness or abnormal muscle tone.      Gait: Gait normal.   Psychiatric:         Mood and Affect: Mood normal.         Behavior: Behavior normal.         Thought Content: Thought content normal.         Judgment: Judgment normal.                        Assessment and Plan Additional age appropriate preventative wellness advice topics were discussed during today's preventative wellness exam(some topics already addressed during AWV portion of the note above):    Physical Activity: Advised cardiovascular activity 150 minutes per week as tolerated. (example brisk walk for 30 minutes, 5 days a week).     Nutrition: Discussed nutrition plan with patient. Information shared in after visit summary. Goal is for a well balanced diet to enhance overall health.     Healthy Weight: Discussed current and goal BMI with patient. Steps to attain this goal discussed. Information shared in after visit summary.     Injury Prevention Discussion:  Information shared in after visit summary.                 1. Hypertension.  Blood pressure is a bit low today, likely due to fasting. He has had no symptomatic hypotension, and his blood pressure was 120 systolic in Dr. Holden's office. He will continue nebivolol.    2. Hyperlipidemia.  Lipid panel is pending. Continue healthy diet and atorvastatin.    3. Prediabetes.  A1c is pending. The treatment remains a healthy diet and avoidance of weight gain.    4. Left knee arthritis.  The knee issue, which started in mid-March, is attributed to arthritis. He has experienced swelling after playing tennis but reports improvement. He plans to see Dr. Holley on September 3, 2024, for further evaluation. Physical therapy and home exercises are recommended. An MRI was discussed but deemed unnecessary by Dr. Hollye.    5. Health Maintenance.  Colorectal cancer screening is scheduled for next month. He sees his urologist every other year for surveillance PSA.      Orders Placed This Encounter   Procedures    Comprehensive Metabolic Panel     Standing Status:   Future     Number of Occurrences:   1     Standing Expiration Date:   8/16/2025     Order Specific Question:   Release to patient     Answer:   Routine Release [[id number]]    Hemoglobin A1c     Standing Status:   Future     Number of Occurrences:   1     Standing Expiration Date:   8/16/2025     Order Specific Question:   Release to patient     Answer:   Routine Release [[id number]]    Lipid Panel     Standing Status:   Future     Number of Occurrences:   1     Standing Expiration Date:   8/16/2025     Order Specific Question:   Release to patient     Answer:   Routine Release [[id number]]    Microalbumin / Creatinine Urine Ratio - Urine, Clean Catch     Standing Status:   Future     Number of Occurrences:   1     Standing Expiration Date:   8/16/2025     Order Specific Question:   Release to patient     Answer:   Routine Release [[id number]]    Ambulatory Referral to Gastroenterology     Referral Priority:   Routine      Referral Type:   Consultation     Referral Reason:   Specialty Services Required     Referred to Provider:   Dann Herring MD     Requested Specialty:   Gastroenterology     Number of Visits Requested:   1    CBC & Differential     Standing Status:   Future     Number of Occurrences:   1     Standing Expiration Date:   8/16/2025     Order Specific Question:   Manual Differential     Answer:   No     Order Specific Question:   Release to patient     Answer:   Routine Release [[id number]]             Follow Up   Return in about 1 year (around 8/16/2025) for Medicare Wellness.  Patient was given instructions and counseling regarding his condition or for health maintenance advice. Please see specific information pulled into the AVS if appropriate.  Patient or patient representative verbalized consent for the use of Ambient Listening during the visit with  Alberto Denis MD for chart documentation. 8/19/2024  16:57 EDT

## 2024-08-17 LAB
ALBUMIN UR-MCNC: 1.6 MG/DL
BASOPHILS # BLD AUTO: 0.08 10*3/MM3 (ref 0–0.2)
BASOPHILS NFR BLD AUTO: 0.8 % (ref 0–1.5)
CREAT UR-MCNC: 240 MG/DL
DEPRECATED RDW RBC AUTO: 44 FL (ref 37–54)
EOSINOPHIL # BLD AUTO: 0.65 10*3/MM3 (ref 0–0.4)
EOSINOPHIL NFR BLD AUTO: 6.9 % (ref 0.3–6.2)
ERYTHROCYTE [DISTWIDTH] IN BLOOD BY AUTOMATED COUNT: 13.1 % (ref 12.3–15.4)
HCT VFR BLD AUTO: 46.2 % (ref 37.5–51)
HGB BLD-MCNC: 15.8 G/DL (ref 13–17.7)
IMM GRANULOCYTES # BLD AUTO: 0.03 10*3/MM3 (ref 0–0.05)
IMM GRANULOCYTES NFR BLD AUTO: 0.3 % (ref 0–0.5)
LYMPHOCYTES # BLD AUTO: 2.15 10*3/MM3 (ref 0.7–3.1)
LYMPHOCYTES NFR BLD AUTO: 22.7 % (ref 19.6–45.3)
MCH RBC QN AUTO: 31.5 PG (ref 26.6–33)
MCHC RBC AUTO-ENTMCNC: 34.2 G/DL (ref 31.5–35.7)
MCV RBC AUTO: 92.2 FL (ref 79–97)
MICROALBUMIN/CREAT UR: 6.7 MG/G (ref 0–29)
MONOCYTES # BLD AUTO: 0.81 10*3/MM3 (ref 0.1–0.9)
MONOCYTES NFR BLD AUTO: 8.6 % (ref 5–12)
NEUTROPHILS NFR BLD AUTO: 5.74 10*3/MM3 (ref 1.7–7)
NEUTROPHILS NFR BLD AUTO: 60.7 % (ref 42.7–76)
NRBC BLD AUTO-RTO: 0 /100 WBC (ref 0–0.2)
PLATELET # BLD AUTO: 172 10*3/MM3 (ref 140–450)
PMV BLD AUTO: 9.5 FL (ref 6–12)
RBC # BLD AUTO: 5.01 10*6/MM3 (ref 4.14–5.8)
WBC NRBC COR # BLD AUTO: 9.46 10*3/MM3 (ref 3.4–10.8)

## 2024-08-23 DIAGNOSIS — Z12.11 COLON CANCER SCREENING: Primary | ICD-10-CM

## 2024-09-04 ENCOUNTER — OFFICE VISIT (OUTPATIENT)
Dept: ORTHOPEDIC SURGERY | Facility: CLINIC | Age: 79
End: 2024-09-04
Payer: MEDICARE

## 2024-09-04 VITALS
WEIGHT: 180 LBS | HEIGHT: 72 IN | SYSTOLIC BLOOD PRESSURE: 132 MMHG | BODY MASS INDEX: 24.38 KG/M2 | DIASTOLIC BLOOD PRESSURE: 84 MMHG

## 2024-09-04 DIAGNOSIS — M17.12 PRIMARY OSTEOARTHRITIS OF LEFT KNEE: Primary | ICD-10-CM

## 2024-09-04 PROCEDURE — 3075F SYST BP GE 130 - 139MM HG: CPT | Performed by: ORTHOPAEDIC SURGERY

## 2024-09-04 PROCEDURE — 99212 OFFICE O/P EST SF 10 MIN: CPT | Performed by: ORTHOPAEDIC SURGERY

## 2024-09-04 PROCEDURE — 1160F RVW MEDS BY RX/DR IN RCRD: CPT | Performed by: ORTHOPAEDIC SURGERY

## 2024-09-04 PROCEDURE — 3079F DIAST BP 80-89 MM HG: CPT | Performed by: ORTHOPAEDIC SURGERY

## 2024-09-04 PROCEDURE — 1159F MED LIST DOCD IN RCRD: CPT | Performed by: ORTHOPAEDIC SURGERY

## 2024-09-04 RX ORDER — ATORVASTATIN CALCIUM 40 MG/1
40 TABLET, FILM COATED ORAL DAILY
Qty: 90 TABLET | Refills: 0 | Status: SHIPPED | OUTPATIENT
Start: 2024-09-04

## 2024-09-04 NOTE — PROGRESS NOTES
[facility] Note    Subjective     Chief Complaint   Patient presents with    Follow-up     2 month f/u -- Primary osteoarthritis of left knee         HPI    It has been 2  month(s) since Mr. Rosales's last visit. He returns to clinic today for follow-up of left knee pain. The issue has been ongoing for 1 year(s). He rates his pain a 0/10 on the pain scale. Previous/current treatments: bracing, NSAIDS, physical therapy, and steroid injection (last injection 05/29/2024). Current symptoms: pain and swelling. The pain is worse with walking and playing tennis ; resting and ice improve the pain. Overall, he is doing the same.  Intermittent effusions.  No pain.  He is playing tennis regularly.      I have reviewed the following portions of the patient's history and agree with: History of Present Illness and Review of Systems    Patient Active Problem List   Diagnosis    Hyperlipidemia    Osteoarthritis    Prediabetes    Primary hypertension    History of prostate cancer    Annual physical exam    Premature atrial contraction    Localized, secondary osteoarthritis of the shoulder region, right    Left knee pain     Past Medical History:   Diagnosis Date    Arthritis Shoulder, 2019    Benign essential hypertension     Cataract Forming, 2018/19    Diverticulosis Diverticulosis, 9/2019    GERD (gastroesophageal reflux disease) Appeared to have GERD about 3 years ago    History of colonic polyps     History of prostate cancer     HL (hearing loss) For many years, hearing aid 10,2019, no difference    Hypercholesterolemia     Prediabetes     Prostate cancer     S/P PROSTATECTOMY    Right-sided back pain     Visual impairment Driving at night, outside city      Past Surgical History:   Procedure Laterality Date    COLONOSCOPY  09/2019    PROSTATECTOMY  1998    Prostate cancer-Radical     TONSILLECTOMY        Family History   Problem Relation Age of Onset    Breast cancer Mother     Cancer Mother          Breast cancer, then spread    Stroke Father         Cause of death    Stomach cancer Father         Gastric    Alcohol abuse Father         But not out of hand    Cancer Father         Bladder    Breast cancer Sister     Cancer Sister         Breast cancer, then spread     Social History     Socioeconomic History    Marital status:    Tobacco Use    Smoking status: Never    Smokeless tobacco: Never   Vaping Use    Vaping status: Never Used   Substance and Sexual Activity    Alcohol use: Yes     Comment: Only  drink alcoholic beverages occasionally    Drug use: No    Sexual activity: Not Currently     Partners: Female     Birth control/protection: Other     Comment: Prostate extracted      Current Outpatient Medications on File Prior to Visit   Medication Sig Dispense Refill    aspirin 81 MG EC tablet Take 1 tablet by mouth Daily.      atorvastatin (LIPITOR) 40 MG tablet TAKE 1 TABLET BY MOUTH DAILY 90 tablet 0    brimonidine (ALPHAGAN) 0.15 % ophthalmic solution INSTILL 1 DROP INTO BOTH EYES THREE TIMES DAILY      dorzolamide-timolol (COSOPT) 2-0.5 % ophthalmic solution Administer 1 drop into the left eye 2 (Two) Times a Day.      Multiple Vitamin (MULTI-VITAMIN DAILY PO) Take 1 tablet by mouth Daily.      nebivolol (BYSTOLIC) 5 MG tablet Take 1 tablet by mouth Daily. 90 tablet 3     No current facility-administered medications on file prior to visit.      No Known Allergies     Review of Systems   Constitutional:  Negative for activity change, appetite change, chills, diaphoresis, fatigue, fever and unexpected weight change.   HENT:  Negative for congestion, dental problem, drooling, ear discharge, ear pain, facial swelling, hearing loss, mouth sores, nosebleeds, postnasal drip, rhinorrhea, sinus pressure, sneezing, sore throat, tinnitus, trouble swallowing and voice change.    Eyes:  Negative for photophobia, pain, discharge, redness, itching and visual disturbance.   Respiratory:  Negative for apnea,  "cough, choking, chest tightness, shortness of breath, wheezing and stridor.    Cardiovascular:  Negative for chest pain, palpitations and leg swelling.   Gastrointestinal:  Negative for abdominal distention, abdominal pain, anal bleeding, blood in stool, constipation, diarrhea, nausea, rectal pain and vomiting.   Endocrine: Negative for cold intolerance, heat intolerance, polydipsia, polyphagia and polyuria.   Genitourinary:  Negative for decreased urine volume, difficulty urinating, dysuria, enuresis, flank pain, frequency, genital sores, hematuria and urgency.   Musculoskeletal:  Positive for arthralgias. Negative for back pain, gait problem, joint swelling, myalgias, neck pain and neck stiffness.   Skin:  Negative for color change, pallor, rash and wound.   Allergic/Immunologic: Negative for environmental allergies, food allergies and immunocompromised state.   Neurological:  Negative for dizziness, tremors, seizures, syncope, facial asymmetry, speech difficulty, weakness, light-headedness, numbness and headaches.   Hematological:  Negative for adenopathy. Does not bruise/bleed easily.   Psychiatric/Behavioral:  Negative for agitation, behavioral problems, confusion, decreased concentration, dysphoric mood, hallucinations, self-injury, sleep disturbance and suicidal ideas. The patient is not nervous/anxious and is not hyperactive.         Objective      Physical Exam  /84   Ht 182.9 cm (72.01\")   Wt 81.6 kg (180 lb)   BMI 24.41 kg/m²     Body mass index is 24.41 kg/m².  BMI is within normal parameters. No other follow-up for BMI required.      General:   Mental Status:  Alert   Appearance: Cooperative, in no acute distress   Build and Nutrition: Well-nourished well-developed male   Orientation: Alert and oriented to person, place and time   Posture: Normal   Gait: Nonantalgic/normal    Integument:              Left knee: No skin lesions, no rash, no ecchymosis     Lower Extremities:              Left " Knee:                          Tenderness:    None                          Effusion:          1+                          Swelling:          None                          Crepitus:          Positive                          Atrophy:           None                          Range of motion:        Extension:       0°                                                              Flexion:           125°  Instability:        No varus laxity, no valgus laxity, negative anterior drawer  Deformities:     None    Imaging/Studies  Imaging Results (Last 24 Hours)       ** No results found for the last 24 hours. **          No new imaging today.    Assessment and Plan     Diagnoses and all orders for this visit:    1. Primary osteoarthritis of left knee (Primary)        1. Primary osteoarthritis of left knee          I reviewed my findings with the patient.  His knee is doing well overall, and he is able to play tennis, but notices intermittent effusions afterwards, but no pain.  Conservative treatment recommended.  I will see him back if he has any worsening or problems in the future.  No surgical indications at this time.    Return if symptoms worsen or fail to improve.      Noe Gonzalez MD  09/04/24  11:35 EDT    Dictated Utilizing Dragon Dictation

## 2024-09-10 RX ORDER — NEBIVOLOL 5 MG/1
5 TABLET ORAL DAILY
Qty: 90 TABLET | Refills: 3 | Status: SHIPPED | OUTPATIENT
Start: 2024-09-10

## 2024-12-05 RX ORDER — ATORVASTATIN CALCIUM 40 MG/1
40 TABLET, FILM COATED ORAL DAILY
Qty: 90 TABLET | Refills: 0 | Status: SHIPPED | OUTPATIENT
Start: 2024-12-05

## 2025-02-24 ENCOUNTER — OFFICE VISIT (OUTPATIENT)
Dept: INTERNAL MEDICINE | Facility: CLINIC | Age: 80
End: 2025-02-24
Payer: MEDICARE

## 2025-02-24 VITALS
DIASTOLIC BLOOD PRESSURE: 68 MMHG | HEIGHT: 72 IN | BODY MASS INDEX: 25.06 KG/M2 | HEART RATE: 72 BPM | TEMPERATURE: 99.5 F | WEIGHT: 185 LBS | SYSTOLIC BLOOD PRESSURE: 122 MMHG

## 2025-02-24 DIAGNOSIS — R05.1 ACUTE COUGH: Primary | ICD-10-CM

## 2025-02-24 LAB
EXPIRATION DATE: NORMAL
FLUAV AG UPPER RESP QL IA.RAPID: NOT DETECTED
FLUBV AG UPPER RESP QL IA.RAPID: NOT DETECTED
INTERNAL CONTROL: NORMAL
Lab: NORMAL
SARS-COV-2 AG UPPER RESP QL IA.RAPID: NOT DETECTED

## 2025-02-24 PROCEDURE — 87428 SARSCOV & INF VIR A&B AG IA: CPT | Performed by: STUDENT IN AN ORGANIZED HEALTH CARE EDUCATION/TRAINING PROGRAM

## 2025-02-24 PROCEDURE — 99213 OFFICE O/P EST LOW 20 MIN: CPT | Performed by: STUDENT IN AN ORGANIZED HEALTH CARE EDUCATION/TRAINING PROGRAM

## 2025-02-24 PROCEDURE — 3078F DIAST BP <80 MM HG: CPT | Performed by: STUDENT IN AN ORGANIZED HEALTH CARE EDUCATION/TRAINING PROGRAM

## 2025-02-24 PROCEDURE — 1126F AMNT PAIN NOTED NONE PRSNT: CPT | Performed by: STUDENT IN AN ORGANIZED HEALTH CARE EDUCATION/TRAINING PROGRAM

## 2025-02-24 PROCEDURE — 3074F SYST BP LT 130 MM HG: CPT | Performed by: STUDENT IN AN ORGANIZED HEALTH CARE EDUCATION/TRAINING PROGRAM

## 2025-02-24 RX ORDER — GUAIFENESIN 600 MG/1
1200 TABLET, EXTENDED RELEASE ORAL 2 TIMES DAILY
Qty: 28 TABLET | Refills: 0 | Status: SHIPPED | OUTPATIENT
Start: 2025-02-24 | End: 2025-03-03

## 2025-02-24 RX ORDER — FLUTICASONE PROPIONATE 50 MCG
2 SPRAY, SUSPENSION (ML) NASAL DAILY
Qty: 18.2 G | Refills: 0 | Status: SHIPPED | OUTPATIENT
Start: 2025-02-24

## 2025-02-24 RX ORDER — LATANOPROST 50 UG/ML
1 SOLUTION/ DROPS OPHTHALMIC NIGHTLY
COMMUNITY
Start: 2025-02-17

## 2025-02-24 NOTE — PROGRESS NOTES
Office Note     Name: Andrea Rosales Jr.    : 1945     MRN: [id number]     Chief Complaint  Cough (Productive cough ) and Nasal Congestion    Subjective     History of Present Illness:  Andrea Rosales Jr. is a 79 y.o. male who presents today for possible sinus infection    History of Present Illness  The patient presents for evaluation of a possible sinus infection.    He reports that his symptoms were initially mild on Thursday and Friday, characterized by a cough. He has a history of recurrent respiratory infections every winter, typically managed with antibiotics. Despite his symptoms, he was able to engage in physical activity, specifically playing tennis for an hour and a half on Friday. However, his condition deteriorated on Saturday, with an increase in phlegm production. By , he experienced a tightening of his cough, increased nasal congestion, and the onset of fever, chills, and body aches. He did not perceive any fever until . He reports no respiratory distress. His cough has become more productive, yielding yellow phlegm, although he has not expectorated any in the past 24 hours. He had a potential exposure to COVID-19 16 days ago when he was in close contact with his cousin who tested positive, but he did not develop symptoms until 12 days post-exposure. He is scheduled to travel out of town tomorrow and is concerned about the possibility of transmitting his illness to others. He took 2 doses of DayQuil approximately 1.5 hours prior to the visit. He has no known history of pulmonary conditions.    MEDICATIONS  Current: DayQuil    Review of Systems:   Review of Systems   Constitutional:  Positive for chills, fatigue and fever.   HENT:  Positive for congestion.    Respiratory:  Positive for cough. Negative for shortness of breath.        Past Medical History:   Past Medical History:   Diagnosis Date    Arthritis Shoulder, 2019    Benign essential hypertension     Cataract  Forming, 2018/19    Diverticulosis Diverticulosis, 9/2019    GERD (gastroesophageal reflux disease) Appeared to have GERD about 3 years ago    History of colonic polyps     History of prostate cancer     HL (hearing loss) For many years, hearing aid 10,2019, no difference    Hypercholesterolemia     Prediabetes     Prostate cancer     S/P PROSTATECTOMY    Right-sided back pain     Visual impairment Driving at night, outside city       Past Surgical History:   Past Surgical History:   Procedure Laterality Date    COLONOSCOPY  09/2019    PROSTATECTOMY  1998    Prostate cancer-Radical     TONSILLECTOMY         Family History:   Family History   Problem Relation Age of Onset    Breast cancer Mother     Cancer Mother         Breast cancer, then spread    Stroke Father         Cause of death    Stomach cancer Father         Gastric    Alcohol abuse Father         But not out of hand    Cancer Father         Bladder    Breast cancer Sister     Cancer Sister         Breast cancer, then spread       Social History:   Social History     Socioeconomic History    Marital status:    Tobacco Use    Smoking status: Never    Smokeless tobacco: Never   Vaping Use    Vaping status: Never Used   Substance and Sexual Activity    Alcohol use: Yes     Comment: Only  drink alcoholic beverages occasionally    Drug use: No    Sexual activity: Not Currently     Partners: Female     Birth control/protection: Other     Comment: Prostate extracted       Immunizations:   Immunization History   Administered Date(s) Administered    COVID-19 (PFIZER) 12YRS+ (COMIRNATY) 10/04/2023, 09/27/2024    COVID-19 (PFIZER) BIVALENT 12+YRS 09/28/2022    COVID-19 (PFIZER) Purple Cap Monovalent 01/29/2021, 02/26/2021, 10/11/2021    Covid-19 (Pfizer) Gray Cap Monovalent 05/06/2022    FLUAD TRI 65YR+ 01/24/2018    Fluad Quad 65+ 11/12/2021, 10/21/2023    Fluzone (or Fluarix & Flulaval for VFC) >6mos 11/12/2021    Fluzone High-Dose 65+YRS 12/31/2018,  "10/16/2019, 08/19/2020, 10/08/2024    Fluzone High-Dose 65+yrs 08/17/2020, 10/07/2022    Pneumococcal Conjugate 13-Valent (PCV13) 11/20/2020    Pneumococcal Polysaccharide (PPSV23) 11/24/2021    Shingrix 10/02/2024, 12/10/2024    Tdap 03/16/2022        Medications:     Current Outpatient Medications:     aspirin 81 MG EC tablet, Take 1 tablet by mouth Daily., Disp: , Rfl:     atorvastatin (LIPITOR) 40 MG tablet, TAKE 1 TABLET BY MOUTH DAILY, Disp: 90 tablet, Rfl: 0    latanoprost (XALATAN) 0.005 % ophthalmic solution, Administer 1 drop to both eyes Every Night., Disp: , Rfl:     Multiple Vitamin (MULTI-VITAMIN DAILY PO), Take 1 tablet by mouth Daily., Disp: , Rfl:     nebivolol (BYSTOLIC) 5 MG tablet, TAKE 1 TABLET BY MOUTH DAILY, Disp: 90 tablet, Rfl: 3    amoxicillin-clavulanate (AUGMENTIN) 875-125 MG per tablet, Take 1 tablet by mouth 2 (Two) Times a Day for 5 days., Disp: 10 tablet, Rfl: 0    brimonidine (ALPHAGAN) 0.15 % ophthalmic solution, INSTILL 1 DROP INTO BOTH EYES THREE TIMES DAILY (Patient not taking: Reported on 2/24/2025), Disp: , Rfl:     dorzolamide-timolol (COSOPT) 2-0.5 % ophthalmic solution, Administer 1 drop into the left eye 2 (Two) Times a Day. (Patient not taking: Reported on 2/24/2025), Disp: , Rfl:     fluticasone (FLONASE) 50 MCG/ACT nasal spray, Administer 2 sprays into the nostril(s) as directed by provider Daily., Disp: 18.2 g, Rfl: 0    guaiFENesin (Mucinex) 600 MG 12 hr tablet, Take 2 tablets by mouth 2 (Two) Times a Day for 7 days., Disp: 28 tablet, Rfl: 0    Allergies:   No Known Allergies    Objective     Vital Signs  /68 (BP Location: Left arm, Patient Position: Sitting, Cuff Size: Adult)   Pulse 72   Temp 99.5 °F (37.5 °C) (Temporal)   Ht 182.9 cm (72.01\")   Wt 83.9 kg (185 lb)   BMI 25.08 kg/m²   Body mass index is 25.08 kg/m².            Physical Exam  Constitutional:       Appearance: Normal appearance.   HENT:      Head: Normocephalic and atraumatic.   Eyes:      " Extraocular Movements: Extraocular movements intact.      Conjunctiva/sclera: Conjunctivae normal.   Pulmonary:      Effort: Pulmonary effort is normal. No respiratory distress.      Breath sounds: Normal breath sounds.   Neurological:      General: No focal deficit present.      Mental Status: He is alert and oriented to person, place, and time.   Psychiatric:         Mood and Affect: Mood normal.         Behavior: Behavior normal.            Results:  Recent Results (from the past 24 hours)   POCT SARS-CoV-2 + Flu Antigen [facility]ILL    Collection Time: 02/24/25 12:50 PM    Specimen: Swab   Result Value Ref Range    SARS Antigen Not Detected Not Detected, Presumptive Negative    Influenza A Antigen [facility] Not Detected Not Detected    Influenza B Antigen [facility] Not Detected Not Detected    Internal Control Passed Passed    Lot Number [id number]     Expiration Date 11/30/2025         Assessment and Plan     Assessment/Plan:  Diagnoses and all orders for this visit:    1. Acute cough (Primary)  - Discussed that symptoms are most likely due to an acute viral URI, will prescribe antibiotic to be taken only if symptoms have not improved after 7 to 10 days of supportive care.  - Patient was tested for COVID and flu and results were negative.  - Advised patient on supportive therapies, including over-the-counter acetaminophen or ibuprofen for fever and bodyaches, maintaining adequate hydration.      Recommendations regarding symptom relief:  For congestion could trial OTC flonase, saline nasal sprays, nasal lavage.  For cough can try OTC dextromethorphan, guaifenesin, benzonatate  For sore throat recommend NSAIDs, salt water gargles.   -     POCT SARS-CoV-2 + Flu Antigen HILL  -     amoxicillin-clavulanate (AUGMENTIN) 875-125 MG per tablet; Take 1 tablet by mouth 2 (Two) Times a Day for 5 days.  Dispense: 10 tablet; Refill: 0  -     guaiFENesin (Mucinex) 600 MG 12 hr tablet; Take 2 tablets by mouth 2 (Two) Times a Day for 7 days.   Dispense: 28 tablet; Refill: 0  -     fluticasone (FLONASE) 50 MCG/ACT nasal spray; Administer 2 sprays into the nostril(s) as directed by provider Daily.  Dispense: 18.2 g; Refill: 0      Follow Up  No follow-ups on file.    Patient or patient representative verbalized consent for the use of Ambient Listening during the visit with  Mikaela Cardoza MD for chart documentation. 2/24/2025  16:01 SURINDER Cardoza MD   [facility] [place] [id number]

## 2025-03-05 RX ORDER — ATORVASTATIN CALCIUM 40 MG/1
40 TABLET, FILM COATED ORAL DAILY
Qty: 90 TABLET | Refills: 0 | Status: SHIPPED | OUTPATIENT
Start: 2025-03-05

## 2025-03-17 ENCOUNTER — OFFICE VISIT (OUTPATIENT)
Dept: INTERNAL MEDICINE | Facility: CLINIC | Age: 80
End: 2025-03-17
Payer: MEDICARE

## 2025-03-17 VITALS
TEMPERATURE: 98 F | HEART RATE: 68 BPM | WEIGHT: 186 LBS | HEIGHT: 72 IN | BODY MASS INDEX: 25.19 KG/M2 | SYSTOLIC BLOOD PRESSURE: 100 MMHG | DIASTOLIC BLOOD PRESSURE: 68 MMHG

## 2025-03-17 DIAGNOSIS — H92.02 DISCOMFORT OF LEFT EAR: ICD-10-CM

## 2025-03-17 DIAGNOSIS — R05.1 ACUTE COUGH: ICD-10-CM

## 2025-03-17 DIAGNOSIS — J02.9 SORE THROAT: ICD-10-CM

## 2025-03-17 DIAGNOSIS — R09.82 POST-NASAL DRIP: Primary | ICD-10-CM

## 2025-03-17 PROCEDURE — 1126F AMNT PAIN NOTED NONE PRSNT: CPT

## 2025-03-17 PROCEDURE — 1159F MED LIST DOCD IN RCRD: CPT

## 2025-03-17 PROCEDURE — 99214 OFFICE O/P EST MOD 30 MIN: CPT

## 2025-03-17 PROCEDURE — 3074F SYST BP LT 130 MM HG: CPT

## 2025-03-17 PROCEDURE — 3078F DIAST BP <80 MM HG: CPT

## 2025-03-17 PROCEDURE — 1160F RVW MEDS BY RX/DR IN RCRD: CPT

## 2025-03-17 RX ORDER — AZITHROMYCIN 250 MG/1
TABLET, FILM COATED ORAL
Qty: 6 TABLET | Refills: 0 | Status: SHIPPED | OUTPATIENT
Start: 2025-03-17

## 2025-03-17 NOTE — PROGRESS NOTES
"    Acute Office Visit      Name: Andrea Rosales Jr.    : 1945     MRN: [id number]   Care Team: Patient Care Team:  Alberto Denis MD as PCP - General  Alberto Denis MD as PCP - Family Medicine  Dann Max MD as Consulting Physician (Cardiology)  Noe Gonzalez MD as Consulting Physician (Orthopedic Surgery)    Chief Complaint  Cough (X 23 days that is productive with clear/yellow phlegm ), Sore Throat (Left side due to drainage ), and Ear Problem (Tingling in left ear and soreness with chewing )    Subjective     History of Present Illness:    Andrea is a 79 year old male reporting to the office today with complaints of cough after 23 days. He states this is ongoing after having a round of antibiotics about a month ago. He states he is coughing up phlegm, but it is now clear mucus. He states it is worse at night. He feels his cough is not as pressured as it was recently, he feels it is more open.   He also complains of a sore throat on the left side and left ear fullness. He states it feels \"stuffed up\" and when he bites down, he can feel pressure near his left ear. He reports stopping the OTC Flonase, but has continued the guaifenesin.     Past Medical History:   Diagnosis Date   • Arthritis Shoulder,    • Benign essential hypertension    • Cataract Forming,    • Clotting disorder Periodic nose bleeds   • Diverticulosis Diverticulosis, 2019   • GERD (gastroesophageal reflux disease) Appeared to have GERD about 3 years ago   • Glaucoma Currently treating   • History of colonic polyps    • History of prostate cancer    • HL (hearing loss) For many years, hearing aid 10,2019, no difference   • Hypercholesterolemia    • Knee swelling 2024   • Prediabetes    • Prostate cancer     S/P PROSTATECTOMY   • Right-sided back pain    • Tear of meniscus of knee    • Tendinitis of knee Maybe - don't know   • Visual impairment Driving at night, outside city       Past " "Surgical History:   Procedure Laterality Date   • COLONOSCOPY  09/2019   • EYE SURGERY  February & March, cataracts in both eyes   • PROSTATECTOMY  1998    Prostate cancer-Radical    • TONSILLECTOMY         Social History     Socioeconomic History   • Marital status:    Tobacco Use   • Smoking status: Never   • Smokeless tobacco: Never   Vaping Use   • Vaping status: Never Used   Substance and Sexual Activity   • Alcohol use: Yes     Comment: Only  drink alcoholic beverages occasionally   • Drug use: No   • Sexual activity: Not Currently     Partners: Female     Birth control/protection: Other     Comment: Prostate extracted         Current Outpatient Medications:   •  aspirin 81 MG EC tablet, Take 1 tablet by mouth Daily., Disp: , Rfl:   •  atorvastatin (LIPITOR) 40 MG tablet, TAKE 1 TABLET BY MOUTH DAILY, Disp: 90 tablet, Rfl: 0  •  fluticasone (FLONASE) 50 MCG/ACT nasal spray, Administer 2 sprays into the nostril(s) as directed by provider Daily., Disp: 18.2 g, Rfl: 0  •  latanoprost (XALATAN) 0.005 % ophthalmic solution, Administer 1 drop to both eyes Every Night., Disp: , Rfl:   •  Multiple Vitamin (MULTI-VITAMIN DAILY PO), Take 1 tablet by mouth Daily., Disp: , Rfl:   •  nebivolol (BYSTOLIC) 5 MG tablet, TAKE 1 TABLET BY MOUTH DAILY, Disp: 90 tablet, Rfl: 3  •  azithromycin (Zithromax Z-King) 250 MG tablet, Take 2 tablets by mouth on day 1, then 1 tablet daily on days 2-5, Disp: 6 tablet, Rfl: 0  •  brimonidine (ALPHAGAN) 0.15 % ophthalmic solution, , Disp: , Rfl:   •  dorzolamide-timolol (COSOPT) 2-0.5 % ophthalmic solution, Administer 1 drop into the left eye 2 (Two) Times a Day. (Patient not taking: Reported on 3/17/2025), Disp: , Rfl:     Procedures    PHQ-9 Total Score:      Objective     Vital Signs  /68 (BP Location: Left arm, Patient Position: Sitting, Cuff Size: Adult)   Pulse 68   Temp 98 °F (36.7 °C) (Temporal)   Ht 182.9 cm (72.01\")   Wt 84.4 kg (186 lb)   BMI 25.22 kg/m² " "  Estimated body mass index is 25.22 kg/m² as calculated from the following:    Height as of this encounter: 182.9 cm (72.01\").    Weight as of this encounter: 84.4 kg (186 lb).            Physical Exam  Vitals and nursing note reviewed.   HENT:      Head: Normocephalic.      Right Ear: Tympanic membrane normal.      Left Ear: Tympanic membrane normal. Decreased hearing noted. Tenderness present. There is mastoid tenderness. Tympanic membrane is erythematous.      Mouth/Throat:      Mouth: Mucous membranes are moist.      Pharynx: Oropharynx is clear.   Cardiovascular:      Rate and Rhythm: Normal rate and regular rhythm.   Pulmonary:      Effort: Pulmonary effort is normal.      Breath sounds: Normal breath sounds.   Skin:     General: Skin is warm and dry.   Neurological:      General: No focal deficit present.      Mental Status: He is alert and oriented to person, place, and time.   Psychiatric:         Mood and Affect: Mood normal.         Behavior: Behavior normal.         Thought Content: Thought content normal.         Judgment: Judgment normal.       Assessment and Plan     Assessment/Plan:  Diagnoses and all orders for this visit:    1. Post-nasal drip (Primary)  -increase fluids to thin secretions  -restart Flonase OTC  -discontinue guaifenesin     2. Discomfort of left ear  -     azithromycin (Zithromax Z-King) 250 MG tablet; Take 2 tablets by mouth on day 1, then 1 tablet daily on days 2-5  Dispense: 6 tablet; Refill: 0    3. Acute cough  -discontinue guaifenesin     4. Sore throat  -continue salt gargle  -discussed that soreness should be relieved once post nasal drip stops    There are no Patient Instructions on file for this visit.  Plan of care reviewed with patient at the conclusion of today's visit. Education was provided regarding diagnosis, management and any prescribed or recommended OTC medications.  Patient verbalizes understanding of and agreement with management plan.    Follow Up  Return " for Next Scheduled Follow up, Next scheduled follow up.    Danielle Frankel, APRN

## 2025-03-27 ENCOUNTER — TELEPHONE (OUTPATIENT)
Dept: INTERNAL MEDICINE | Facility: CLINIC | Age: 80
End: 2025-03-27
Payer: MEDICARE

## 2025-03-27 DIAGNOSIS — H92.02 DISCOMFORT OF LEFT EAR: Primary | ICD-10-CM

## 2025-03-27 NOTE — TELEPHONE ENCOUNTER
"  Caller: Andrea Rosales Jr. \"Bill\"    Relationship: Self    Best call back number: [phone]     What was the call regarding: PATIENT STATES HE WAS IN OFFICE ON 03/17/25 AND SAW DR. BELTRE. HE WAS GIVEN AN ANTIBIOTIC AND IT HAS HELPED SOME, BUT HE STATES HE STILL HAS SOME SORENESS IN HIS LEFT EAR, JAW, AND THROAT. HE WOULD LIKE TO SEE IF DR. BELTRE OR DR. DIA COULD CALL SOMETHING ELSE IN FOR HIM, OR IF HE NEEDS TO BE SEEN AGAIN, HE IS WILLING TO DO THAT ALSO. PLEASE REACH OUT TO HIM AND LET HIM KNOW EITHER WAY.         "

## 2025-04-03 ENCOUNTER — OFFICE VISIT (OUTPATIENT)
Dept: INTERNAL MEDICINE | Facility: CLINIC | Age: 80
End: 2025-04-03
Payer: MEDICARE

## 2025-04-03 VITALS
HEART RATE: 58 BPM | SYSTOLIC BLOOD PRESSURE: 132 MMHG | TEMPERATURE: 98.7 F | OXYGEN SATURATION: 97 % | DIASTOLIC BLOOD PRESSURE: 78 MMHG | BODY MASS INDEX: 25.22 KG/M2 | WEIGHT: 186.2 LBS | HEIGHT: 72 IN

## 2025-04-03 DIAGNOSIS — H92.02 LEFT EAR PAIN: Primary | ICD-10-CM

## 2025-04-03 PROCEDURE — 3075F SYST BP GE 130 - 139MM HG: CPT | Performed by: INTERNAL MEDICINE

## 2025-04-03 PROCEDURE — 99213 OFFICE O/P EST LOW 20 MIN: CPT | Performed by: INTERNAL MEDICINE

## 2025-04-03 PROCEDURE — 1160F RVW MEDS BY RX/DR IN RCRD: CPT | Performed by: INTERNAL MEDICINE

## 2025-04-03 PROCEDURE — 1159F MED LIST DOCD IN RCRD: CPT | Performed by: INTERNAL MEDICINE

## 2025-04-03 PROCEDURE — 3078F DIAST BP <80 MM HG: CPT | Performed by: INTERNAL MEDICINE

## 2025-04-03 PROCEDURE — 1125F AMNT PAIN NOTED PAIN PRSNT: CPT | Performed by: INTERNAL MEDICINE

## 2025-04-03 NOTE — PROGRESS NOTES
Answers submitted by the patient for this visit:  Ear Pain Questionnaire (Submitted on 4/3/2025)  Chief Complaint: Ear pain  Affected ear: left  Chronicity: new  Onset: 1 to 4 weeks ago  Progression since onset: unchanged  Frequency: intermittently  Fever: unmeasured  Pain - numeric: 1/10  drainage: No  headaches: No  hoarse voice: No  jaw pain: Yes  Treatments tried : antibiotics  Improvement on treatment: no relief  Additional Information: pulsating sensation, no real pain.  Accompanied by mild discomfort on left side of mouth while chewing, throat just a little sore on the left side when swallowing food  [facility]     Andrea Rosales .  1945   [id number]      Patient Care Team:  Alberto Denis MD as PCP - General  Alberto Denis MD as PCP - Family Medicine  Dann Max MD as Consulting Physician (Cardiology)  Noe Gonzalez MD as Consulting Physician (Orthopedic Surgery)    Chief Complaint::   Chief Complaint   Patient presents with    Earache    Jaw Pain        HPI  History of Present Illness  The patient is an 79-year-old male who presents with complaints of ear pain and jaw pain.    He reports a pulsating sensation in his left ear, which he first noticed after starting his initial course of antibiotics for a cough. He also experiences mild discomfort when chewing food on the left side, accompanied by a slight sore throat. He self-diagnosed these symptoms as an ear infection, similar to one he had in his youth that presented with drainage and throat discomfort rather than ear pain. Despite completing two courses of antibiotics, his cough persisted, and he continued to experience the pulsating sensation in his ear. He has been using Flonase but did not use it today. He has not experienced any congestion or cough for several days. He was previously prescribed a Z-King by Dr. Frankel, which he completed, but his symptoms persisted. A second course of antibiotics was  then prescribed, which he completed last night.    He conducted online research and found information suggesting a possible link between TMJ disorder and pulsatile tinnitus, and he is curious if his symptoms could be related to this condition.    Additionally, he reports experiencing trigger thumb, which he attributes to arthritis. The condition has shown some improvement today and has not interfered with his ability to play tennis.    MEDICATIONS  Flonase      Chronic Conditions:      Patient Active Problem List   Diagnosis    Hyperlipidemia    Osteoarthritis    Prediabetes    Primary hypertension    History of prostate cancer    Annual physical exam    Premature atrial contraction    Localized, secondary osteoarthritis of the shoulder region, right    Left knee pain        Past Medical History:   Diagnosis Date    Arthritis Shoulder, 2019    Benign essential hypertension     Cataract Forming, 2018/19    Clotting disorder Periodic nose bleeds    Diverticulosis Diverticulosis, 9/2019    GERD (gastroesophageal reflux disease) Appeared to have GERD about 3 years ago    Glaucoma Currently treating    History of colonic polyps     History of prostate cancer     HL (hearing loss) For many years, hearing aid 10,2019, no difference    Hypercholesterolemia     Knee swelling March 2024    Prediabetes     Prostate cancer     S/P PROSTATECTOMY    Right-sided back pain     Tear of meniscus of knee 2015    Tendinitis of knee Maybe - don't know    Visual impairment Driving at night, outside city       Past Surgical History:   Procedure Laterality Date    COLONOSCOPY  09/2019    EYE SURGERY  February & March, cataracts in both eyes    PROSTATECTOMY  1998    Prostate cancer-Radical     TONSILLECTOMY         Family History   Problem Relation Age of Onset    Breast cancer Mother     Cancer Mother         Breast cancer, then spread    Stroke Father         Cause of death    Stomach cancer Father         Gastric    Alcohol abuse Father  "        But not out of hand    Cancer Father         Bladder    Breast cancer Sister     Cancer Sister         Breast cancer, then spread       Social History     Socioeconomic History    Marital status:    Tobacco Use    Smoking status: Never    Smokeless tobacco: Never   Vaping Use    Vaping status: Never Used   Substance and Sexual Activity    Alcohol use: Yes     Comment: Only  drink alcoholic beverages occasionally    Drug use: No    Sexual activity: Not Currently     Partners: Female     Birth control/protection: Other     Comment: Prostate extracted       No Known Allergies      Current Outpatient Medications:     aspirin 81 MG EC tablet, Take 1 tablet by mouth Daily., Disp: , Rfl:     atorvastatin (LIPITOR) 40 MG tablet, TAKE 1 TABLET BY MOUTH DAILY, Disp: 90 tablet, Rfl: 0    fluticasone (FLONASE) 50 MCG/ACT nasal spray, Administer 2 sprays into the nostril(s) as directed by provider Daily., Disp: 18.2 g, Rfl: 0    latanoprost (XALATAN) 0.005 % ophthalmic solution, Administer 1 drop to both eyes Every Night., Disp: , Rfl:     Multiple Vitamin (MULTI-VITAMIN DAILY PO), Take 1 tablet by mouth Daily., Disp: , Rfl:     nebivolol (BYSTOLIC) 5 MG tablet, TAKE 1 TABLET BY MOUTH DAILY, Disp: 90 tablet, Rfl: 3    Review of Systems   HENT:  Positive for ear pain, hearing loss and sore throat. Negative for congestion, rhinorrhea and tinnitus.    Respiratory:  Negative for cough.    Musculoskeletal:  Negative for neck pain.   Skin:  Negative for rash.   Neurological:  Negative for dizziness.   Hematological:  Negative for adenopathy.        Vital Signs  Vitals:    04/03/25 1450   BP: 132/78   BP Location: Left arm   Patient Position: Sitting   Cuff Size: Adult   Pulse: 58   Temp: 98.7 °F (37.1 °C)   TempSrc: Infrared   SpO2: 97%   Weight: 84.5 kg (186 lb 3.2 oz)   Height: 182.9 cm (72.01\")   PainSc: 1    PainLoc: Ear       Physical Exam  Vitals reviewed.   Constitutional:       Appearance: Normal appearance. He " is well-developed.   HENT:      Head: Normocephalic and atraumatic.      Right Ear: Tympanic membrane and ear canal normal.      Left Ear: Tympanic membrane and ear canal normal.      Mouth/Throat:      Pharynx: Oropharynx is clear. No posterior oropharyngeal erythema.   Eyes:      Extraocular Movements: Extraocular movements intact.      Pupils: Pupils are equal, round, and reactive to light.   Cardiovascular:      Rate and Rhythm: Normal rate and regular rhythm.      Heart sounds: Normal heart sounds. No murmur heard.  Pulmonary:      Effort: Pulmonary effort is normal.      Breath sounds: Normal breath sounds.   Neurological:      Mental Status: He is alert and oriented to person, place, and time.        Physical Exam  There is a small amount of wax in the left ear canal. The left tympanic membrane appears normal.  There is no tenderness over the temporomandibular joint (TMJ).    Procedures    ACE III MINI        Results             Assessment/Plan:    Diagnoses and all orders for this visit:    1. Left ear pain (Primary)      Assessment & Plan  1. Left ear pain.  There is no obvious abnormality on examination. The left tympanic membrane looks normal, and there is no tenderness over the TMJ joint. This could possibly be due to residual inflammation from recurrent upper respiratory infections over the last few weeks. He is advised to continue using Flonase. There is a small amount of wax in the canal, and he may use wax softening drops to help remove it. If there is no improvement in the ear symptoms in 3 to 4 weeks, a referral to ENT will be considered.    2. Jaw pain.  There is no tenderness over the TMJ joint. The jaw pain could be related to TMJ disorder or residual inflammation. If there is more jaw pain, he needs to see his dentist to follow up as previously scheduled.    3. Trigger thumb.  He reports experiencing trigger thumb, which he attributes to arthritis. The condition has shown some improvement today  and has not interfered with his ability to play tennis.      Plan of care reviewed with patient at the conclusion of today's visit. Education was provided regarding diagnosis, management, and any prescribed or recommended OTC medications.Patient verbalizes understanding of and agreement with management plan.     Patient or patient representative verbalized consent for the use of Ambient Listening during the visit with  Alberto Denis MD for chart documentation. 4/7/2025  20:50 EDT        Alberto Denis MD

## 2025-06-11 NOTE — TELEPHONE ENCOUNTER
"    Caller: Andrea Rosales Jr. \"Bill\"    Relationship: Self    Best call back number: [phone]    Requested Prescriptions:   Requested Prescriptions     Pending Prescriptions Disp Refills    atorvastatin (LIPITOR) 40 MG tablet 90 tablet 0     Sig: Take 1 tablet by mouth Daily.        Pharmacy where request should be sent: [id number] - [address] [facility] - [phone]  - [phone] FX     Last office visit with prescribing clinician: 5/31/2024   Last telemedicine visit with prescribing clinician: Visit date not found   Next office visit with prescribing clinician: 9/4/2025     Does the patient have less than a 3 day supply:  [x] Yes  [] No    Would you like a call back once the refill request has been completed: [] Yes [] No    If the office needs to give you a call back, can they leave a voicemail: [] Yes [] No    Keven Wakefield Rep   06/11/25 13:19 EDT         "

## 2025-06-12 RX ORDER — ATORVASTATIN CALCIUM 40 MG/1
40 TABLET, FILM COATED ORAL DAILY
Qty: 90 TABLET | Refills: 0 | Status: SHIPPED | OUTPATIENT
Start: 2025-06-12

## 2025-07-07 RX ORDER — ATORVASTATIN CALCIUM 40 MG/1
40 TABLET, FILM COATED ORAL DAILY
Qty: 90 TABLET | Refills: 1 | Status: SHIPPED | OUTPATIENT
Start: 2025-07-07

## 2025-07-07 NOTE — TELEPHONE ENCOUNTER
Lab Results   Component Value Date    CHOL 110 08/16/2024    CHLPL 106 08/14/2023    TRIG 66 08/16/2024    HDL 36 (L) 08/16/2024    LDL 60 08/16/2024     Lab Results   Component Value Date    GLUCOSE 109 (H) 08/16/2024    BUN 17 08/16/2024    CREATININE 1.06 08/16/2024     08/16/2024    K 4.8 08/16/2024     08/16/2024    CALCIUM 10.2 08/16/2024    PROTEINTOT 7.3 08/16/2024    ALBUMIN 4.4 08/16/2024    ALT 36 08/16/2024    AST 36 08/16/2024    ALKPHOS 92 08/16/2024    BILITOT 0.7 08/16/2024    GLOB 2.9 08/16/2024    AGRATIO 1.5 08/16/2024    BCR 16.0 08/16/2024    ANIONGAP 9.8 08/16/2024    EGFR 71.4 08/16/2024

## 2025-08-18 ENCOUNTER — LAB (OUTPATIENT)
Dept: LAB | Facility: HOSPITAL | Age: 80
End: 2025-08-18
Payer: MEDICARE

## 2025-08-18 DIAGNOSIS — R73.03 PREDIABETES: ICD-10-CM

## 2025-08-18 DIAGNOSIS — I10 PRIMARY HYPERTENSION: ICD-10-CM

## 2025-08-18 DIAGNOSIS — E78.2 MIXED HYPERLIPIDEMIA: ICD-10-CM

## 2025-08-18 LAB
ALBUMIN SERPL-MCNC: 4.3 G/DL (ref 3.5–5.2)
ALBUMIN UR-MCNC: <1.2 MG/DL
ALBUMIN/GLOB SERPL: 1.5 G/DL
ALP SERPL-CCNC: 90 U/L (ref 39–117)
ALT SERPL W P-5'-P-CCNC: 39 U/L (ref 1–41)
ANION GAP SERPL CALCULATED.3IONS-SCNC: 13 MMOL/L (ref 5–15)
AST SERPL-CCNC: 37 U/L (ref 1–40)
BASOPHILS # BLD AUTO: 0.06 10*3/MM3 (ref 0–0.2)
BASOPHILS NFR BLD AUTO: 0.7 % (ref 0–1.5)
BILIRUB SERPL-MCNC: 0.6 MG/DL (ref 0–1.2)
BUN SERPL-MCNC: 18 MG/DL (ref 8–23)
BUN/CREAT SERPL: 18.8 (ref 7–25)
CALCIUM SPEC-SCNC: 9.9 MG/DL (ref 8.6–10.5)
CHLORIDE SERPL-SCNC: 105 MMOL/L (ref 98–107)
CHOLEST SERPL-MCNC: 107 MG/DL (ref 0–200)
CO2 SERPL-SCNC: 23 MMOL/L (ref 22–29)
CREAT SERPL-MCNC: 0.96 MG/DL (ref 0.76–1.27)
CREAT UR-MCNC: 111 MG/DL
DEPRECATED RDW RBC AUTO: 43.8 FL (ref 37–54)
EGFRCR SERPLBLD CKD-EPI 2021: 79.9 ML/MIN/1.73
EOSINOPHIL # BLD AUTO: 0.86 10*3/MM3 (ref 0–0.4)
EOSINOPHIL NFR BLD AUTO: 9.9 % (ref 0.3–6.2)
ERYTHROCYTE [DISTWIDTH] IN BLOOD BY AUTOMATED COUNT: 13 % (ref 12.3–15.4)
GLOBULIN UR ELPH-MCNC: 2.9 GM/DL
GLUCOSE SERPL-MCNC: 105 MG/DL (ref 65–99)
HBA1C MFR BLD: 5.7 % (ref 4.8–5.6)
HCT VFR BLD AUTO: 46 % (ref 37.5–51)
HDLC SERPL-MCNC: 35 MG/DL (ref 40–60)
HGB BLD-MCNC: 15.7 G/DL (ref 13–17.7)
IMM GRANULOCYTES # BLD AUTO: 0.02 10*3/MM3 (ref 0–0.05)
IMM GRANULOCYTES NFR BLD AUTO: 0.2 % (ref 0–0.5)
LDLC SERPL CALC-MCNC: 47 MG/DL (ref 0–100)
LDLC/HDLC SERPL: 1.23 {RATIO}
LYMPHOCYTES # BLD AUTO: 2.81 10*3/MM3 (ref 0.7–3.1)
LYMPHOCYTES NFR BLD AUTO: 32.3 % (ref 19.6–45.3)
MCH RBC QN AUTO: 32.2 PG (ref 26.6–33)
MCHC RBC AUTO-ENTMCNC: 34.1 G/DL (ref 31.5–35.7)
MCV RBC AUTO: 94.5 FL (ref 79–97)
MICROALBUMIN/CREAT UR: NORMAL MG/G{CREAT}
MONOCYTES # BLD AUTO: 0.89 10*3/MM3 (ref 0.1–0.9)
MONOCYTES NFR BLD AUTO: 10.2 % (ref 5–12)
NEUTROPHILS NFR BLD AUTO: 4.05 10*3/MM3 (ref 1.7–7)
NEUTROPHILS NFR BLD AUTO: 46.7 % (ref 42.7–76)
PLATELET # BLD AUTO: 141 10*3/MM3 (ref 140–450)
PMV BLD AUTO: 9.8 FL (ref 6–12)
POTASSIUM SERPL-SCNC: 4.2 MMOL/L (ref 3.5–5.2)
PROT SERPL-MCNC: 7.2 G/DL (ref 6–8.5)
RBC # BLD AUTO: 4.87 10*6/MM3 (ref 4.14–5.8)
SODIUM SERPL-SCNC: 141 MMOL/L (ref 136–145)
TRIGL SERPL-MCNC: 144 MG/DL (ref 0–150)
VLDLC SERPL-MCNC: 25 MG/DL (ref 5–40)
WBC NRBC COR # BLD AUTO: 8.69 10*3/MM3 (ref 3.4–10.8)

## 2025-08-18 PROCEDURE — 82570 ASSAY OF URINE CREATININE: CPT

## 2025-08-18 PROCEDURE — 83036 HEMOGLOBIN GLYCOSYLATED A1C: CPT

## 2025-08-18 PROCEDURE — 82043 UR ALBUMIN QUANTITATIVE: CPT

## 2025-08-18 PROCEDURE — 80061 LIPID PANEL: CPT

## 2025-08-18 PROCEDURE — 85025 COMPLETE CBC W/AUTO DIFF WBC: CPT

## 2025-08-18 PROCEDURE — 80053 COMPREHEN METABOLIC PANEL: CPT

## 2025-08-21 ENCOUNTER — OFFICE VISIT (OUTPATIENT)
Dept: INTERNAL MEDICINE | Facility: CLINIC | Age: 80
End: 2025-08-21
Payer: MEDICARE

## 2025-08-21 VITALS
HEART RATE: 64 BPM | HEIGHT: 72 IN | DIASTOLIC BLOOD PRESSURE: 64 MMHG | SYSTOLIC BLOOD PRESSURE: 118 MMHG | TEMPERATURE: 98.7 F | WEIGHT: 184.6 LBS | BODY MASS INDEX: 25 KG/M2 | OXYGEN SATURATION: 98 %

## 2025-08-21 DIAGNOSIS — E78.2 MIXED HYPERLIPIDEMIA: ICD-10-CM

## 2025-08-21 DIAGNOSIS — I10 PRIMARY HYPERTENSION: ICD-10-CM

## 2025-08-21 DIAGNOSIS — R73.03 PREDIABETES: ICD-10-CM

## 2025-08-21 DIAGNOSIS — Z00.00 MEDICARE ANNUAL WELLNESS VISIT, SUBSEQUENT: Primary | ICD-10-CM
